# Patient Record
Sex: FEMALE | Race: WHITE | NOT HISPANIC OR LATINO | Employment: STUDENT | URBAN - METROPOLITAN AREA
[De-identification: names, ages, dates, MRNs, and addresses within clinical notes are randomized per-mention and may not be internally consistent; named-entity substitution may affect disease eponyms.]

---
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
## 2019-05-11 ENCOUNTER — TRANSFERRED RECORDS (OUTPATIENT)
Dept: HEALTH INFORMATION MANAGEMENT | Facility: CLINIC | Age: 19
End: 2019-05-11

## 2019-12-26 ENCOUNTER — TRANSFERRED RECORDS (OUTPATIENT)
Dept: HEALTH INFORMATION MANAGEMENT | Facility: CLINIC | Age: 19
End: 2019-12-26

## 2020-01-06 ENCOUNTER — OFFICE VISIT (OUTPATIENT)
Dept: PSYCHIATRY | Facility: CLINIC | Age: 20
End: 2020-01-06

## 2020-01-06 DIAGNOSIS — F33.2 SEVERE EPISODE OF RECURRENT MAJOR DEPRESSIVE DISORDER, WITHOUT PSYCHOTIC FEATURES (H): Primary | ICD-10-CM
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
## 2020-01-10 ASSESSMENT — ANXIETY QUESTIONNAIRES
GAD7 TOTAL SCORE: 15
3. WORRYING TOO MUCH ABOUT DIFFERENT THINGS: NEARLY EVERY DAY
2. NOT BEING ABLE TO STOP OR CONTROL WORRYING: NEARLY EVERY DAY
5. BEING SO RESTLESS THAT IT IS HARD TO SIT STILL: NOT AT ALL
6. BECOMING EASILY ANNOYED OR IRRITABLE: NEARLY EVERY DAY
1. FEELING NERVOUS, ANXIOUS, OR ON EDGE: NEARLY EVERY DAY
4. TROUBLE RELAXING: SEVERAL DAYS
7. FEELING AFRAID AS IF SOMETHING AWFUL MIGHT HAPPEN: MORE THAN HALF THE DAYS

## 2020-01-10 ASSESSMENT — PATIENT HEALTH QUESTIONNAIRE - PHQ9: SUM OF ALL RESPONSES TO PHQ QUESTIONS 1-9: 21

## 2020-01-11 ASSESSMENT — ANXIETY QUESTIONNAIRES: GAD7 TOTAL SCORE: 15

## 2020-01-15 ENCOUNTER — MEDICAL CORRESPONDENCE (OUTPATIENT)
Dept: HEALTH INFORMATION MANAGEMENT | Facility: CLINIC | Age: 20
End: 2020-01-15

## 2020-01-24 ENCOUNTER — OFFICE VISIT (OUTPATIENT)
Dept: PSYCHIATRY | Facility: CLINIC | Age: 20
End: 2020-01-24

## 2020-01-24 DIAGNOSIS — F33.2 SEVERE EPISODE OF RECURRENT MAJOR DEPRESSIVE DISORDER, WITHOUT PSYCHOTIC FEATURES (H): Primary | ICD-10-CM
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
## 2020-01-27 ENCOUNTER — TRANSFERRED RECORDS (OUTPATIENT)
Dept: HEALTH INFORMATION MANAGEMENT | Facility: CLINIC | Age: 20
End: 2020-01-27

## 2020-02-03 ENCOUNTER — OFFICE VISIT (OUTPATIENT)
Dept: PSYCHIATRY | Facility: CLINIC | Age: 20
End: 2020-02-03

## 2020-02-03 VITALS — SYSTOLIC BLOOD PRESSURE: 140 MMHG | DIASTOLIC BLOOD PRESSURE: 97 MMHG | HEART RATE: 89 BPM | WEIGHT: 182.4 LBS

## 2020-02-03 DIAGNOSIS — F33.2 SEVERE EPISODE OF RECURRENT MAJOR DEPRESSIVE DISORDER, WITHOUT PSYCHOTIC FEATURES (H): ICD-10-CM

## 2020-02-03 RX ORDER — ZIPRASIDONE HYDROCHLORIDE 20 MG/1
20 CAPSULE ORAL 4 TIMES DAILY
Status: ON HOLD | COMMUNITY
Start: 2020-01-27 | End: 2020-09-02

## 2020-02-03 RX ORDER — HEPARIN SODIUM (PORCINE) LOCK FLUSH IV SOLN 100 UNIT/ML 100 UNIT/ML
5 SOLUTION INTRAVENOUS
Status: CANCELLED | OUTPATIENT
Start: 2020-02-05

## 2020-02-03 RX ORDER — NORETHINDRONE ACETATE AND ETHINYL ESTRADIOL 1.5-30(21)
KIT ORAL
COMMUNITY
Start: 2019-07-23 | End: 2020-08-23

## 2020-02-03 RX ORDER — ONDANSETRON 2 MG/ML
4 INJECTION INTRAMUSCULAR; INTRAVENOUS
Status: CANCELLED | OUTPATIENT
Start: 2020-02-05

## 2020-02-03 RX ORDER — DESVENLAFAXINE 100 MG/1
100 TABLET, EXTENDED RELEASE ORAL AT BEDTIME
Status: ON HOLD | COMMUNITY
Start: 2020-01-27 | End: 2020-09-02

## 2020-02-03 RX ORDER — HEPARIN SODIUM,PORCINE 10 UNIT/ML
5 VIAL (ML) INTRAVENOUS
Status: CANCELLED | OUTPATIENT
Start: 2020-02-05

## 2020-02-03 RX ORDER — HYDRALAZINE HYDROCHLORIDE 20 MG/ML
10 INJECTION INTRAMUSCULAR; INTRAVENOUS
Status: CANCELLED | OUTPATIENT
Start: 2020-02-05

## 2020-02-03 SDOH — HEALTH STABILITY: MENTAL HEALTH: HOW OFTEN DO YOU HAVE A DRINK CONTAINING ALCOHOL?: NEVER

## 2020-02-03 ASSESSMENT — ANXIETY QUESTIONNAIRES
3. WORRYING TOO MUCH ABOUT DIFFERENT THINGS: NEARLY EVERY DAY
7. FEELING AFRAID AS IF SOMETHING AWFUL MIGHT HAPPEN: MORE THAN HALF THE DAYS
GAD7 TOTAL SCORE: 15
5. BEING SO RESTLESS THAT IT IS HARD TO SIT STILL: NOT AT ALL
2. NOT BEING ABLE TO STOP OR CONTROL WORRYING: NEARLY EVERY DAY
6. BECOMING EASILY ANNOYED OR IRRITABLE: NEARLY EVERY DAY
1. FEELING NERVOUS, ANXIOUS, OR ON EDGE: NEARLY EVERY DAY

## 2020-02-03 ASSESSMENT — PAIN SCALES - GENERAL: PAINLEVEL: NO PAIN (0)

## 2020-02-03 ASSESSMENT — PATIENT HEALTH QUESTIONNAIRE - PHQ9
5. POOR APPETITE OR OVEREATING: SEVERAL DAYS
SUM OF ALL RESPONSES TO PHQ QUESTIONS 1-9: 21

## 2020-02-03 NOTE — PATIENT INSTRUCTIONS
[facility]  [address] [place] [address]  [phone]      [email or web address]    Follow up with [facility] for ketamine infusion    Follow up with me after completing 6 ketamine infusions to assess need to continue with treatment    Follow up with [facility] to get pre-authorization for VNS Therapy. If denied, would consider VNS once on medicare and could participate in study
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
## 2020-02-04 ASSESSMENT — ANXIETY QUESTIONNAIRES: GAD7 TOTAL SCORE: 15

## 2020-02-17 ENCOUNTER — INFUSION THERAPY VISIT (OUTPATIENT)
Dept: INFUSION THERAPY | Facility: CLINIC | Age: 20
End: 2020-02-17
Attending: PSYCHIATRY & NEUROLOGY
Payer: COMMERCIAL

## 2020-02-17 VITALS
SYSTOLIC BLOOD PRESSURE: 119 MMHG | DIASTOLIC BLOOD PRESSURE: 82 MMHG | WEIGHT: 175.93 LBS | BODY MASS INDEX: 29.31 KG/M2 | TEMPERATURE: 98.3 F | HEART RATE: 74 BPM | HEIGHT: 65 IN | OXYGEN SATURATION: 99 % | RESPIRATION RATE: 16 BRPM

## 2020-02-17 DIAGNOSIS — F33.2 SEVERE EPISODE OF RECURRENT MAJOR DEPRESSIVE DISORDER, WITHOUT PSYCHOTIC FEATURES (H): Primary | ICD-10-CM

## 2020-02-17 PROCEDURE — 25800030 ZZH RX IP 258 OP 636: Performed by: PSYCHIATRY & NEUROLOGY

## 2020-02-17 PROCEDURE — 96365 THER/PROPH/DIAG IV INF INIT: CPT

## 2020-02-17 PROCEDURE — 25000125 ZZHC RX 250: Performed by: PSYCHIATRY & NEUROLOGY

## 2020-02-17 RX ORDER — MULTIPLE VITAMINS W/ MINERALS TAB 9MG-400MCG
1 TAB ORAL DAILY
Status: ON HOLD | COMMUNITY
End: 2020-08-23

## 2020-02-17 RX ORDER — HYDRALAZINE HYDROCHLORIDE 20 MG/ML
10 INJECTION INTRAMUSCULAR; INTRAVENOUS
Status: CANCELLED | OUTPATIENT
Start: 2020-02-17

## 2020-02-17 RX ORDER — HEPARIN SODIUM,PORCINE 10 UNIT/ML
5 VIAL (ML) INTRAVENOUS
Status: CANCELLED | OUTPATIENT
Start: 2020-02-17

## 2020-02-17 RX ORDER — ONDANSETRON 2 MG/ML
4 INJECTION INTRAMUSCULAR; INTRAVENOUS
Status: CANCELLED | OUTPATIENT
Start: 2020-02-17

## 2020-02-17 RX ORDER — HEPARIN SODIUM (PORCINE) LOCK FLUSH IV SOLN 100 UNIT/ML 100 UNIT/ML
5 SOLUTION INTRAVENOUS
Status: CANCELLED | OUTPATIENT
Start: 2020-02-17

## 2020-02-17 RX ADMIN — KETAMINE HYDROCHLORIDE 30 MG: 50 INJECTION, SOLUTION INTRAMUSCULAR; INTRAVENOUS at 09:09

## 2020-02-17 ASSESSMENT — MIFFLIN-ST. JEOR: SCORE: 1559.91

## 2020-02-17 NOTE — PROGRESS NOTES
Infusion Nursing Note:  Yoana Webber presents today for ketamine infusion (first dose).    Patient seen by provider today: No   present during visit today: Not Applicable.    Note: Verbally reviewed ketamine infusion.  No written material available via [facility]    Intravenous Access:  Peripheral IV placed.    Treatment Conditions:  Denies active SI.      Post Infusion Assessment:  Patient tolerated infusion without incident.  Patient observed for 120 minutes post ketamine infusion, per protocol.  Blood return noted pre and post infusion.  Site patent and intact, free from redness, edema or discomfort.  No evidence of extravasations.  Access discontinued per protocol.       Discharge Plan:   Discharge instructions reviewed with: Patient.  Patient discharged in stable condition accompanied by: mother.  Departure Mode: Ambulatory.  Patient smiling, laughing during infusion.    Elizabeth Uriarte

## 2020-02-18 ENCOUNTER — TELEPHONE (OUTPATIENT)
Dept: PSYCHIATRY | Facility: CLINIC | Age: 20
End: 2020-02-18

## 2020-02-18 NOTE — TELEPHONE ENCOUNTER
-Writer received call back from patient's mom, asking for consents to be put in mail for VNS auth's.

## 2020-02-18 NOTE — TELEPHONE ENCOUNTER
Zita Fregoso MD Swanson, Melanie A, RN             Patient agrees to start pre-authorization with[facility] for VNS

## 2020-02-18 NOTE — TELEPHONE ENCOUNTER
-Writer called Yoana to discuss forms needed to to start auth through [facility].  Received voicemail.  Left message asking for a return call. Clinic number provided.

## 2020-02-19 ENCOUNTER — INFUSION THERAPY VISIT (OUTPATIENT)
Dept: INFUSION THERAPY | Facility: CLINIC | Age: 20
End: 2020-02-19
Attending: PSYCHIATRY & NEUROLOGY
Payer: COMMERCIAL

## 2020-02-19 VITALS
DIASTOLIC BLOOD PRESSURE: 84 MMHG | HEART RATE: 75 BPM | SYSTOLIC BLOOD PRESSURE: 119 MMHG | OXYGEN SATURATION: 98 % | RESPIRATION RATE: 16 BRPM

## 2020-02-19 DIAGNOSIS — F33.2 SEVERE EPISODE OF RECURRENT MAJOR DEPRESSIVE DISORDER, WITHOUT PSYCHOTIC FEATURES (H): Primary | ICD-10-CM

## 2020-02-19 PROCEDURE — 25000125 ZZHC RX 250: Performed by: PSYCHIATRY & NEUROLOGY

## 2020-02-19 PROCEDURE — 25800030 ZZH RX IP 258 OP 636: Performed by: PSYCHIATRY & NEUROLOGY

## 2020-02-19 PROCEDURE — 96365 THER/PROPH/DIAG IV INF INIT: CPT

## 2020-02-19 RX ORDER — ONDANSETRON 2 MG/ML
4 INJECTION INTRAMUSCULAR; INTRAVENOUS
Status: CANCELLED | OUTPATIENT
Start: 2020-02-19

## 2020-02-19 RX ORDER — HEPARIN SODIUM,PORCINE 10 UNIT/ML
5 VIAL (ML) INTRAVENOUS
Status: CANCELLED | OUTPATIENT
Start: 2020-02-19

## 2020-02-19 RX ORDER — HYDRALAZINE HYDROCHLORIDE 20 MG/ML
10 INJECTION INTRAMUSCULAR; INTRAVENOUS
Status: CANCELLED | OUTPATIENT
Start: 2020-02-19

## 2020-02-19 RX ORDER — HEPARIN SODIUM (PORCINE) LOCK FLUSH IV SOLN 100 UNIT/ML 100 UNIT/ML
5 SOLUTION INTRAVENOUS
Status: CANCELLED | OUTPATIENT
Start: 2020-02-19

## 2020-02-19 RX ADMIN — KETAMINE HYDROCHLORIDE 30 MG: 50 INJECTION, SOLUTION INTRAMUSCULAR; INTRAVENOUS at 10:45

## 2020-02-19 NOTE — PROGRESS NOTES
Pt here for second ketamine infusion.  States she tolerated the first one well but does not notice a difference yet in mood.    Med infused over 40 min via P IV.  Monitored on cont pulse ox and q 15 min VS during and for 2 hr post infusion.  Tolerated well.      Discharged to home w/ ride.  RTC tomorrow.

## 2020-02-20 ENCOUNTER — INFUSION THERAPY VISIT (OUTPATIENT)
Dept: INFUSION THERAPY | Facility: CLINIC | Age: 20
End: 2020-02-20
Attending: PSYCHIATRY & NEUROLOGY
Payer: COMMERCIAL

## 2020-02-20 VITALS
TEMPERATURE: 98.5 F | SYSTOLIC BLOOD PRESSURE: 112 MMHG | DIASTOLIC BLOOD PRESSURE: 72 MMHG | RESPIRATION RATE: 16 BRPM | HEART RATE: 60 BPM | OXYGEN SATURATION: 99 %

## 2020-02-20 DIAGNOSIS — F33.2 SEVERE EPISODE OF RECURRENT MAJOR DEPRESSIVE DISORDER, WITHOUT PSYCHOTIC FEATURES (H): Primary | ICD-10-CM

## 2020-02-20 PROCEDURE — 25000125 ZZHC RX 250: Performed by: PSYCHIATRY & NEUROLOGY

## 2020-02-20 PROCEDURE — 96365 THER/PROPH/DIAG IV INF INIT: CPT

## 2020-02-20 PROCEDURE — 25800030 ZZH RX IP 258 OP 636: Performed by: PSYCHIATRY & NEUROLOGY

## 2020-02-20 RX ORDER — HYDRALAZINE HYDROCHLORIDE 20 MG/ML
10 INJECTION INTRAMUSCULAR; INTRAVENOUS
Status: CANCELLED | OUTPATIENT
Start: 2020-02-20

## 2020-02-20 RX ORDER — HEPARIN SODIUM,PORCINE 10 UNIT/ML
5 VIAL (ML) INTRAVENOUS
Status: CANCELLED | OUTPATIENT
Start: 2020-02-20

## 2020-02-20 RX ORDER — ONDANSETRON 2 MG/ML
4 INJECTION INTRAMUSCULAR; INTRAVENOUS
Status: CANCELLED | OUTPATIENT
Start: 2020-02-20

## 2020-02-20 RX ORDER — HEPARIN SODIUM (PORCINE) LOCK FLUSH IV SOLN 100 UNIT/ML 100 UNIT/ML
5 SOLUTION INTRAVENOUS
Status: CANCELLED | OUTPATIENT
Start: 2020-02-20

## 2020-02-20 RX ADMIN — SODIUM CHLORIDE 250 ML: 9 INJECTION, SOLUTION INTRAVENOUS at 10:51

## 2020-02-20 RX ADMIN — KETAMINE HYDROCHLORIDE 30 MG: 50 INJECTION, SOLUTION INTRAMUSCULAR; INTRAVENOUS at 10:52

## 2020-02-20 ASSESSMENT — PAIN SCALES - GENERAL: PAINLEVEL: NO PAIN (0)

## 2020-02-20 NOTE — PROGRESS NOTES
Infusion Nursing Note:  Yoana Webber presents today for ketamine.    Patient seen by provider today: No   present during visit today: Not Applicable.    Note: Patient states she does not feel any differently after receiving 2 doses of ketamine.  No changes or concerns at this time.    Intravenous Access:  Peripheral IV placed.    Post Infusion Assessment:  Patient tolerated infusion without incident.  VS monitored per protocol.  Patient observed for 120 minutes post ketamine per protocol.  Blood return noted pre and post infusion.  Site patent and intact, free from redness, edema or discomfort.  No evidence of extravasations.  Access discontinued per protocol.       Discharge Plan:   Patient discharged in stable condition accompanied by: father.  Departure Mode: Ambulatory.  Will return to clinic on Monday.  Esme Lee RN

## 2020-02-24 ENCOUNTER — INFUSION THERAPY VISIT (OUTPATIENT)
Dept: INFUSION THERAPY | Facility: CLINIC | Age: 20
End: 2020-02-24
Attending: PSYCHIATRY & NEUROLOGY
Payer: COMMERCIAL

## 2020-02-24 VITALS
BODY MASS INDEX: 28.94 KG/M2 | OXYGEN SATURATION: 96 % | SYSTOLIC BLOOD PRESSURE: 104 MMHG | DIASTOLIC BLOOD PRESSURE: 64 MMHG | RESPIRATION RATE: 16 BRPM | TEMPERATURE: 98.3 F | WEIGHT: 172.6 LBS | HEART RATE: 76 BPM

## 2020-02-24 DIAGNOSIS — F33.2 SEVERE EPISODE OF RECURRENT MAJOR DEPRESSIVE DISORDER, WITHOUT PSYCHOTIC FEATURES (H): Primary | ICD-10-CM

## 2020-02-24 PROCEDURE — 25000125 ZZHC RX 250: Performed by: PSYCHIATRY & NEUROLOGY

## 2020-02-24 PROCEDURE — 96365 THER/PROPH/DIAG IV INF INIT: CPT

## 2020-02-24 PROCEDURE — 25800030 ZZH RX IP 258 OP 636: Performed by: PSYCHIATRY & NEUROLOGY

## 2020-02-24 RX ORDER — ONDANSETRON 2 MG/ML
4 INJECTION INTRAMUSCULAR; INTRAVENOUS
Status: CANCELLED | OUTPATIENT
Start: 2020-02-24

## 2020-02-24 RX ORDER — HEPARIN SODIUM (PORCINE) LOCK FLUSH IV SOLN 100 UNIT/ML 100 UNIT/ML
5 SOLUTION INTRAVENOUS
Status: CANCELLED | OUTPATIENT
Start: 2020-02-24

## 2020-02-24 RX ORDER — HYDRALAZINE HYDROCHLORIDE 20 MG/ML
10 INJECTION INTRAMUSCULAR; INTRAVENOUS
Status: CANCELLED | OUTPATIENT
Start: 2020-02-24

## 2020-02-24 RX ORDER — HEPARIN SODIUM,PORCINE 10 UNIT/ML
5 VIAL (ML) INTRAVENOUS
Status: CANCELLED | OUTPATIENT
Start: 2020-02-24

## 2020-02-24 RX ADMIN — SODIUM CHLORIDE 250 ML: 9 INJECTION, SOLUTION INTRAVENOUS at 08:58

## 2020-02-24 RX ADMIN — KETAMINE HYDROCHLORIDE 30 MG: 50 INJECTION, SOLUTION INTRAMUSCULAR; INTRAVENOUS at 08:58

## 2020-02-24 ASSESSMENT — PAIN SCALES - GENERAL: PAINLEVEL: NO PAIN (0)

## 2020-02-26 ENCOUNTER — INFUSION THERAPY VISIT (OUTPATIENT)
Dept: INFUSION THERAPY | Facility: CLINIC | Age: 20
End: 2020-02-26
Attending: PSYCHIATRY & NEUROLOGY
Payer: COMMERCIAL

## 2020-02-26 VITALS
OXYGEN SATURATION: 97 % | RESPIRATION RATE: 16 BRPM | SYSTOLIC BLOOD PRESSURE: 112 MMHG | DIASTOLIC BLOOD PRESSURE: 74 MMHG | HEART RATE: 78 BPM

## 2020-02-26 DIAGNOSIS — F33.2 SEVERE EPISODE OF RECURRENT MAJOR DEPRESSIVE DISORDER, WITHOUT PSYCHOTIC FEATURES (H): Primary | ICD-10-CM

## 2020-02-26 PROCEDURE — 96365 THER/PROPH/DIAG IV INF INIT: CPT

## 2020-02-26 PROCEDURE — 25000125 ZZHC RX 250: Performed by: PSYCHIATRY & NEUROLOGY

## 2020-02-26 PROCEDURE — 25800030 ZZH RX IP 258 OP 636: Performed by: PSYCHIATRY & NEUROLOGY

## 2020-02-26 RX ORDER — HEPARIN SODIUM (PORCINE) LOCK FLUSH IV SOLN 100 UNIT/ML 100 UNIT/ML
5 SOLUTION INTRAVENOUS
Status: CANCELLED | OUTPATIENT
Start: 2020-02-26

## 2020-02-26 RX ORDER — HYDRALAZINE HYDROCHLORIDE 20 MG/ML
10 INJECTION INTRAMUSCULAR; INTRAVENOUS
Status: CANCELLED | OUTPATIENT
Start: 2020-02-26

## 2020-02-26 RX ORDER — ONDANSETRON 2 MG/ML
4 INJECTION INTRAMUSCULAR; INTRAVENOUS
Status: CANCELLED | OUTPATIENT
Start: 2020-02-26

## 2020-02-26 RX ORDER — HEPARIN SODIUM,PORCINE 10 UNIT/ML
5 VIAL (ML) INTRAVENOUS
Status: CANCELLED | OUTPATIENT
Start: 2020-02-26

## 2020-02-26 RX ADMIN — SODIUM CHLORIDE 250 ML: 9 INJECTION, SOLUTION INTRAVENOUS at 09:45

## 2020-02-26 RX ADMIN — KETAMINE HYDROCHLORIDE 30 MG: 50 INJECTION, SOLUTION INTRAMUSCULAR; INTRAVENOUS at 09:45

## 2020-02-26 NOTE — PROGRESS NOTES
"Pt here for ketamine infusion.   States she has noticed only minimal improvement in her mood.  Says she feels \"floaty\" during infusion.    Med infused over 40 min via P IV.  Monitored on cont pulse ox and q 15 min VS during and for 2 hr post infusion.  Tolerated well.      Discharged to home w/ ride.  RTC tomorrow.     "

## 2020-02-27 ENCOUNTER — INFUSION THERAPY VISIT (OUTPATIENT)
Dept: INFUSION THERAPY | Facility: CLINIC | Age: 20
End: 2020-02-27
Attending: PSYCHIATRY & NEUROLOGY
Payer: COMMERCIAL

## 2020-02-27 VITALS
OXYGEN SATURATION: 99 % | HEART RATE: 73 BPM | DIASTOLIC BLOOD PRESSURE: 73 MMHG | TEMPERATURE: 98.3 F | RESPIRATION RATE: 16 BRPM | SYSTOLIC BLOOD PRESSURE: 119 MMHG

## 2020-02-27 DIAGNOSIS — F33.2 SEVERE EPISODE OF RECURRENT MAJOR DEPRESSIVE DISORDER, WITHOUT PSYCHOTIC FEATURES (H): Primary | ICD-10-CM

## 2020-02-27 PROCEDURE — 25000125 ZZHC RX 250: Performed by: PSYCHIATRY & NEUROLOGY

## 2020-02-27 PROCEDURE — 96365 THER/PROPH/DIAG IV INF INIT: CPT

## 2020-02-27 PROCEDURE — 25800030 ZZH RX IP 258 OP 636: Performed by: PSYCHIATRY & NEUROLOGY

## 2020-02-27 RX ORDER — HEPARIN SODIUM (PORCINE) LOCK FLUSH IV SOLN 100 UNIT/ML 100 UNIT/ML
5 SOLUTION INTRAVENOUS
Status: CANCELLED | OUTPATIENT
Start: 2020-02-27

## 2020-02-27 RX ORDER — HYDRALAZINE HYDROCHLORIDE 20 MG/ML
10 INJECTION INTRAMUSCULAR; INTRAVENOUS
Status: CANCELLED | OUTPATIENT
Start: 2020-02-27

## 2020-02-27 RX ORDER — HEPARIN SODIUM,PORCINE 10 UNIT/ML
5 VIAL (ML) INTRAVENOUS
Status: CANCELLED | OUTPATIENT
Start: 2020-02-27

## 2020-02-27 RX ORDER — ONDANSETRON 2 MG/ML
4 INJECTION INTRAMUSCULAR; INTRAVENOUS
Status: CANCELLED | OUTPATIENT
Start: 2020-02-27

## 2020-02-27 RX ADMIN — SODIUM CHLORIDE 250 ML: 9 INJECTION, SOLUTION INTRAVENOUS at 10:39

## 2020-02-27 RX ADMIN — KETAMINE HYDROCHLORIDE 30 MG: 50 INJECTION, SOLUTION INTRAMUSCULAR; INTRAVENOUS at 10:39

## 2020-02-27 NOTE — PROGRESS NOTES
Infusion Nursing Note:  Yoana Webber presents today for ketamine infusion.    Patient seen by provider today: No   present during visit today: Not Applicable.    Note: States she is not sure if ketamine is helpful with depression or anxiety.    Intravenous Access:  Peripheral IV placed.    Treatment Conditions:  Not Applicable.      Post Infusion Assessment:  Patient tolerated infusion without incident.  Patient observed for 120 minutes post ketamine infusion, per protocol.  Blood return noted pre and post infusion.  Site patent and intact, free from redness, edema or discomfort.  No evidence of extravasations.  Access discontinued per protocol.  E-message sent to Dr. Fregoso to consider changing observation time from 2 hours to 1 hour.       Discharge Plan:   Patient discharged in stable condition accompanied by: father.  Departure Mode: Ambulatory.    Elizabeth Uriarte

## 2020-03-02 ENCOUNTER — INFUSION THERAPY VISIT (OUTPATIENT)
Dept: INFUSION THERAPY | Facility: CLINIC | Age: 20
End: 2020-03-02
Attending: PSYCHIATRY & NEUROLOGY
Payer: COMMERCIAL

## 2020-03-02 VITALS
SYSTOLIC BLOOD PRESSURE: 138 MMHG | OXYGEN SATURATION: 98 % | DIASTOLIC BLOOD PRESSURE: 89 MMHG | HEART RATE: 68 BPM | TEMPERATURE: 98.8 F | RESPIRATION RATE: 16 BRPM | WEIGHT: 172.7 LBS | BODY MASS INDEX: 28.96 KG/M2

## 2020-03-02 DIAGNOSIS — F33.2 SEVERE EPISODE OF RECURRENT MAJOR DEPRESSIVE DISORDER, WITHOUT PSYCHOTIC FEATURES (H): Primary | ICD-10-CM

## 2020-03-02 PROCEDURE — 25000125 ZZHC RX 250: Performed by: PSYCHIATRY & NEUROLOGY

## 2020-03-02 PROCEDURE — 96365 THER/PROPH/DIAG IV INF INIT: CPT

## 2020-03-02 PROCEDURE — 25800030 ZZH RX IP 258 OP 636: Performed by: PSYCHIATRY & NEUROLOGY

## 2020-03-02 RX ORDER — HEPARIN SODIUM,PORCINE 10 UNIT/ML
5 VIAL (ML) INTRAVENOUS
Status: CANCELLED | OUTPATIENT
Start: 2020-03-02

## 2020-03-02 RX ORDER — HYDRALAZINE HYDROCHLORIDE 20 MG/ML
10 INJECTION INTRAMUSCULAR; INTRAVENOUS
Status: CANCELLED | OUTPATIENT
Start: 2020-03-02

## 2020-03-02 RX ORDER — ONDANSETRON 2 MG/ML
4 INJECTION INTRAMUSCULAR; INTRAVENOUS
Status: CANCELLED | OUTPATIENT
Start: 2020-03-02

## 2020-03-02 RX ORDER — HEPARIN SODIUM (PORCINE) LOCK FLUSH IV SOLN 100 UNIT/ML 100 UNIT/ML
5 SOLUTION INTRAVENOUS
Status: CANCELLED | OUTPATIENT
Start: 2020-03-02

## 2020-03-02 RX ADMIN — SODIUM CHLORIDE 250 ML: 9 INJECTION, SOLUTION INTRAVENOUS at 11:14

## 2020-03-02 RX ADMIN — KETAMINE HYDROCHLORIDE 30 MG: 50 INJECTION, SOLUTION INTRAMUSCULAR; INTRAVENOUS at 11:15

## 2020-03-02 ASSESSMENT — PAIN SCALES - GENERAL: PAINLEVEL: NO PAIN (0)

## 2020-03-02 NOTE — PROGRESS NOTES
Infusion Nursing Note:  Yoana Webber presents today for ketamine.    Patient seen by provider today: No   present during visit today: Not Applicable.    Note: Patient states depression and anxiety are unchanged.    Intravenous Access:  Peripheral IV placed.    Post Infusion Assessment:  Patient tolerated infusion without incident.  VS monitored per protocol.  Patient observed for 120 minutes post ketamine per protocol.  Blood return noted pre and post infusion.  Site patent and intact, free from redness, edema or discomfort.  No evidence of extravasations.  Access discontinued per protocol.       Discharge Plan:   Patient discharged in stable condition accompanied by: mother  Departure Mode: Ambulatory.  Will return to clinic on Wednesday.  Esme Lee RN

## 2020-03-04 ENCOUNTER — INFUSION THERAPY VISIT (OUTPATIENT)
Dept: INFUSION THERAPY | Facility: CLINIC | Age: 20
End: 2020-03-04
Attending: PSYCHIATRY & NEUROLOGY
Payer: COMMERCIAL

## 2020-03-04 VITALS
TEMPERATURE: 98.1 F | OXYGEN SATURATION: 97 % | SYSTOLIC BLOOD PRESSURE: 125 MMHG | RESPIRATION RATE: 16 BRPM | HEART RATE: 86 BPM | DIASTOLIC BLOOD PRESSURE: 85 MMHG

## 2020-03-04 DIAGNOSIS — F33.2 SEVERE EPISODE OF RECURRENT MAJOR DEPRESSIVE DISORDER, WITHOUT PSYCHOTIC FEATURES (H): Primary | ICD-10-CM

## 2020-03-04 PROCEDURE — 25800030 ZZH RX IP 258 OP 636: Performed by: PSYCHIATRY & NEUROLOGY

## 2020-03-04 PROCEDURE — 96365 THER/PROPH/DIAG IV INF INIT: CPT

## 2020-03-04 PROCEDURE — 25000125 ZZHC RX 250: Performed by: PSYCHIATRY & NEUROLOGY

## 2020-03-04 RX ORDER — HEPARIN SODIUM (PORCINE) LOCK FLUSH IV SOLN 100 UNIT/ML 100 UNIT/ML
5 SOLUTION INTRAVENOUS
Status: CANCELLED | OUTPATIENT
Start: 2020-03-04

## 2020-03-04 RX ORDER — HYDRALAZINE HYDROCHLORIDE 20 MG/ML
10 INJECTION INTRAMUSCULAR; INTRAVENOUS
Status: CANCELLED | OUTPATIENT
Start: 2020-03-04

## 2020-03-04 RX ORDER — ONDANSETRON 2 MG/ML
4 INJECTION INTRAMUSCULAR; INTRAVENOUS
Status: CANCELLED | OUTPATIENT
Start: 2020-03-04

## 2020-03-04 RX ORDER — HEPARIN SODIUM,PORCINE 10 UNIT/ML
5 VIAL (ML) INTRAVENOUS
Status: CANCELLED | OUTPATIENT
Start: 2020-03-04

## 2020-03-04 RX ADMIN — KETAMINE HYDROCHLORIDE 30 MG: 50 INJECTION, SOLUTION INTRAMUSCULAR; INTRAVENOUS at 11:13

## 2020-03-04 RX ADMIN — SODIUM CHLORIDE 250 ML: 9 INJECTION, SOLUTION INTRAVENOUS at 11:13

## 2020-03-04 ASSESSMENT — PAIN SCALES - GENERAL: PAINLEVEL: NO PAIN (0)

## 2020-03-04 NOTE — PROGRESS NOTES
"Infusion Nursing Note:  Yoana Webber presents today for ketamine.    Patient seen by provider today: No   present during visit today: Not Applicable.    Note: Patient states she actually showered yesterday, so \"that's something\".    Intravenous Access:  Peripheral IV placed.    Post Infusion Assessment:  Patient tolerated infusion without incident.  VS monitored per protocol.  Patient observed for 120 minutes post ketamine per protocol.  Blood return noted pre and post infusion.  Site patent and intact, free from redness, edema or discomfort.  No evidence of extravasations.  Access discontinued per protocol.       Discharge Plan:   Patient discharged in stable condition accompanied by: friend.  Departure Mode: Ambulatory.  Will return to clinic tomorrow.  Esme Lee RN                        "

## 2020-03-05 ENCOUNTER — INFUSION THERAPY VISIT (OUTPATIENT)
Dept: INFUSION THERAPY | Facility: CLINIC | Age: 20
End: 2020-03-05
Attending: PSYCHIATRY & NEUROLOGY
Payer: COMMERCIAL

## 2020-03-05 VITALS
DIASTOLIC BLOOD PRESSURE: 78 MMHG | RESPIRATION RATE: 14 BRPM | HEART RATE: 79 BPM | OXYGEN SATURATION: 99 % | SYSTOLIC BLOOD PRESSURE: 134 MMHG

## 2020-03-05 DIAGNOSIS — F33.2 SEVERE EPISODE OF RECURRENT MAJOR DEPRESSIVE DISORDER, WITHOUT PSYCHOTIC FEATURES (H): Primary | ICD-10-CM

## 2020-03-05 PROCEDURE — 25800030 ZZH RX IP 258 OP 636: Performed by: PSYCHIATRY & NEUROLOGY

## 2020-03-05 PROCEDURE — 25000125 ZZHC RX 250: Performed by: PSYCHIATRY & NEUROLOGY

## 2020-03-05 PROCEDURE — 96365 THER/PROPH/DIAG IV INF INIT: CPT

## 2020-03-05 RX ORDER — HYDRALAZINE HYDROCHLORIDE 20 MG/ML
10 INJECTION INTRAMUSCULAR; INTRAVENOUS
Status: CANCELLED | OUTPATIENT
Start: 2020-03-05

## 2020-03-05 RX ORDER — HEPARIN SODIUM (PORCINE) LOCK FLUSH IV SOLN 100 UNIT/ML 100 UNIT/ML
5 SOLUTION INTRAVENOUS
Status: CANCELLED | OUTPATIENT
Start: 2020-03-05

## 2020-03-05 RX ORDER — ONDANSETRON 2 MG/ML
4 INJECTION INTRAMUSCULAR; INTRAVENOUS
Status: CANCELLED | OUTPATIENT
Start: 2020-03-05

## 2020-03-05 RX ORDER — HEPARIN SODIUM,PORCINE 10 UNIT/ML
5 VIAL (ML) INTRAVENOUS
Status: CANCELLED | OUTPATIENT
Start: 2020-03-05

## 2020-03-05 RX ADMIN — KETAMINE HYDROCHLORIDE 30 MG: 50 INJECTION, SOLUTION INTRAMUSCULAR; INTRAVENOUS at 10:49

## 2020-03-05 RX ADMIN — SODIUM CHLORIDE 250 ML: 9 INJECTION, SOLUTION INTRAVENOUS at 10:48

## 2020-03-05 NOTE — PROGRESS NOTES
Pt here for ketamine infusion.  States she has not noticed much improvement yet.     Med infused over 40 min via P IV.  Monitored on cont pulse ox and q 15 min VS during and for 2 hr post infusion.  Tolerated well.      Discharged to home w/ ride.  Does not have further infusions scheduled until she sees Dr Fregoso so message sent re: further infusions.

## 2020-03-10 ENCOUNTER — TELEPHONE (OUTPATIENT)
Dept: PSYCHIATRY | Facility: CLINIC | Age: 20
End: 2020-03-10

## 2020-03-10 ENCOUNTER — HOSPITAL ENCOUNTER (EMERGENCY)
Facility: CLINIC | Age: 20
Discharge: HOME | End: 2020-03-11
Attending: EMERGENCY MEDICINE | Admitting: EMERGENCY MEDICINE
Payer: COMMERCIAL

## 2020-03-10 DIAGNOSIS — F32.A DEPRESSION, UNSPECIFIED DEPRESSION TYPE: ICD-10-CM

## 2020-03-10 DIAGNOSIS — R45.851 SUICIDAL THOUGHTS: ICD-10-CM

## 2020-03-10 PROCEDURE — 80320 DRUG SCREEN QUANTALCOHOLS: CPT | Performed by: FAMILY MEDICINE

## 2020-03-10 PROCEDURE — 99284 EMERGENCY DEPT VISIT MOD MDM: CPT | Mod: Z6 | Performed by: EMERGENCY MEDICINE

## 2020-03-10 PROCEDURE — 81025 URINE PREGNANCY TEST: CPT | Performed by: FAMILY MEDICINE

## 2020-03-10 PROCEDURE — 80307 DRUG TEST PRSMV CHEM ANLYZR: CPT | Performed by: FAMILY MEDICINE

## 2020-03-10 PROCEDURE — 99285 EMERGENCY DEPT VISIT HI MDM: CPT | Mod: 25 | Performed by: EMERGENCY MEDICINE

## 2020-03-10 ASSESSMENT — MIFFLIN-ST. JEOR: SCORE: 1521.11

## 2020-03-10 NOTE — TELEPHONE ENCOUNTER
-Writer called patient to follow up with her on treatment plan.  Let her know that since she did not repsond to the Ketamine infusions, Dr. Fregoso would like to stop them.  He would like her to contact [facility]- encouraged her to do so.      -Also will resend consents for Amaliatravis Shyanne to get started on PA for VNS.

## 2020-03-10 NOTE — ED AVS SNAPSHOT
[facility], [place], Emergency Department  [address]  [facility] [address]  Phone:  [phone]  Fax:  [phone]                                    Yoana Webber   MRN: [id number]    Department:  [facility], Emergency Department   Date of Visit:  3/10/2020           After Visit Summary Signature Page    I have received my discharge instructions, and my questions have been answered. I have discussed any challenges I see with this plan with the nurse or doctor.    ..........................................................................................................................................  Patient/Patient Representative Signature      ..........................................................................................................................................  Patient Representative Print Name and Relationship to Patient    ..................................................               ................................................  Date                                   Time    ..........................................................................................................................................  Reviewed by Signature/Title    ...................................................              ..............................................  Date                                               Time          22EPIC Rev 08/18

## 2020-03-11 VITALS
DIASTOLIC BLOOD PRESSURE: 92 MMHG | OXYGEN SATURATION: 99 % | BODY MASS INDEX: 29.02 KG/M2 | WEIGHT: 170 LBS | TEMPERATURE: 96.9 F | SYSTOLIC BLOOD PRESSURE: 142 MMHG | HEIGHT: 64 IN

## 2020-03-11 LAB
AMPHETAMINES UR QL SCN: NEGATIVE
BARBITURATES UR QL: NEGATIVE
BENZODIAZ UR QL: NEGATIVE
CANNABINOIDS UR QL SCN: NEGATIVE
COCAINE UR QL: NEGATIVE
ETHANOL UR QL SCN: NEGATIVE
HCG UR QL: NEGATIVE
OPIATES UR QL SCN: NEGATIVE

## 2020-03-11 PROCEDURE — 90791 PSYCH DIAGNOSTIC EVALUATION: CPT

## 2020-03-11 ASSESSMENT — ENCOUNTER SYMPTOMS: DYSPHORIC MOOD: 1

## 2020-03-11 NOTE — DISCHARGE INSTRUCTIONS
Follow-up with your psychiatrist in 5 days as previously scheduled.  Return to the emergency department as needed for any new or worsening symptoms.

## 2020-03-11 NOTE — ED NOTES
"Discharge Call Completed.  Pt reported doing, \"okay.\" Pt stated they have a follow-up appointment scheduled and do not have any questions or concerns at this time.   "

## 2020-03-11 NOTE — ED NOTES
Pt laying on floor in hallway.  Pt asked multiple times to sit in chair due to traffic in hallway and floor of hospital being exposed to many contaminants.  Pt adamant about not sitting in chair.  Psych Associate and writer as Charge RN have spoken to Pt.  We currently do not have a room to place Pt.

## 2020-03-11 NOTE — ED TRIAGE NOTES
Pt states she has ongoing depression and found out today that her Ketamine infusions were being discontinued due to minimal improvement.  Pt states that the news had made her depression worse and she told her parents she was having suicidal thoughts so they brought her here.  Pt denies having taken anything in attempt to harm herself.
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
## 2020-03-16 ENCOUNTER — OFFICE VISIT (OUTPATIENT)
Dept: PSYCHIATRY | Facility: CLINIC | Age: 20
End: 2020-03-16

## 2020-03-16 VITALS
DIASTOLIC BLOOD PRESSURE: 82 MMHG | SYSTOLIC BLOOD PRESSURE: 132 MMHG | BODY MASS INDEX: 29.08 KG/M2 | HEART RATE: 130 BPM | WEIGHT: 169.4 LBS

## 2020-03-16 DIAGNOSIS — F33.1 MAJOR DEPRESSIVE DISORDER, RECURRENT EPISODE, MODERATE (H): Primary | ICD-10-CM

## 2020-03-16 ASSESSMENT — PAIN SCALES - GENERAL: PAINLEVEL: NO PAIN (0)
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
## 2020-03-16 NOTE — PATIENT INSTRUCTIONS
- we can restart the ketamine infusions: call the infusion center about restarting this. We want you to get 2 treatments this week and another early next week before moving to weekly treatments.    - we will have you sign a release of information for the [place] [facility] clinic. Once this is signed, we can send notes over there. Please also contact them to ask about getting on their schedule. Then give us an update next week about the plan of action for the TMS treatments    [facility]  [address] [place] [id number]  [phone]      [email or web address]

## 2020-03-24 ENCOUNTER — INFUSION THERAPY VISIT (OUTPATIENT)
Dept: INFUSION THERAPY | Facility: CLINIC | Age: 20
End: 2020-03-24
Attending: PSYCHIATRY & NEUROLOGY
Payer: COMMERCIAL

## 2020-03-24 VITALS
DIASTOLIC BLOOD PRESSURE: 83 MMHG | SYSTOLIC BLOOD PRESSURE: 125 MMHG | BODY MASS INDEX: 29.18 KG/M2 | OXYGEN SATURATION: 99 % | TEMPERATURE: 98.1 F | WEIGHT: 169.97 LBS

## 2020-03-24 DIAGNOSIS — F33.2 SEVERE EPISODE OF RECURRENT MAJOR DEPRESSIVE DISORDER, WITHOUT PSYCHOTIC FEATURES (H): Primary | ICD-10-CM

## 2020-03-24 PROCEDURE — 25000125 ZZHC RX 250: Performed by: PSYCHIATRY & NEUROLOGY

## 2020-03-24 PROCEDURE — 96365 THER/PROPH/DIAG IV INF INIT: CPT

## 2020-03-24 PROCEDURE — 25800030 ZZH RX IP 258 OP 636: Performed by: PSYCHIATRY & NEUROLOGY

## 2020-03-24 RX ORDER — HEPARIN SODIUM (PORCINE) LOCK FLUSH IV SOLN 100 UNIT/ML 100 UNIT/ML
5 SOLUTION INTRAVENOUS
Status: CANCELLED | OUTPATIENT
Start: 2020-03-24

## 2020-03-24 RX ORDER — HYDRALAZINE HYDROCHLORIDE 20 MG/ML
10 INJECTION INTRAMUSCULAR; INTRAVENOUS
Status: CANCELLED | OUTPATIENT
Start: 2020-03-24

## 2020-03-24 RX ORDER — HEPARIN SODIUM,PORCINE 10 UNIT/ML
5 VIAL (ML) INTRAVENOUS
Status: CANCELLED | OUTPATIENT
Start: 2020-03-24

## 2020-03-24 RX ORDER — ONDANSETRON 2 MG/ML
4 INJECTION INTRAMUSCULAR; INTRAVENOUS
Status: CANCELLED | OUTPATIENT
Start: 2020-03-24

## 2020-03-24 RX ADMIN — KETAMINE HYDROCHLORIDE 30 MG: 50 INJECTION, SOLUTION INTRAMUSCULAR; INTRAVENOUS at 12:24

## 2020-03-24 RX ADMIN — SODIUM CHLORIDE 250 ML: 9 INJECTION, SOLUTION INTRAVENOUS at 12:30

## 2020-03-24 NOTE — PROGRESS NOTES
Infusion Nursing Note:  Yoana Webber presents today for ketamine infusin.    Patient seen by provider today: No   present during visit today: Not Applicable.    Note: N/A.    Intravenous Access:  Peripheral IV placed.    Treatment Conditions:  Not Applicable.      Post Infusion Assessment:  Patient tolerated infusion without incident.  Patient observed for 60 minutes post ketamine infusion,  per protocol.  Blood return noted pre and post infusion.  Site patent and intact, free from redness, edema or discomfort.  No evidence of extravasations.  Access discontinued per protocol.       Discharge Plan:   Patient discharged in stable condition accompanied by: self.  Departure Mode: Ambulatory.  Denies active SI.    Elizabeth Uriarte

## 2020-03-26 ENCOUNTER — INFUSION THERAPY VISIT (OUTPATIENT)
Dept: INFUSION THERAPY | Facility: CLINIC | Age: 20
End: 2020-03-26
Attending: PSYCHIATRY & NEUROLOGY
Payer: COMMERCIAL

## 2020-03-26 VITALS
SYSTOLIC BLOOD PRESSURE: 132 MMHG | OXYGEN SATURATION: 99 % | TEMPERATURE: 98.5 F | HEART RATE: 85 BPM | DIASTOLIC BLOOD PRESSURE: 86 MMHG | RESPIRATION RATE: 16 BRPM

## 2020-03-26 DIAGNOSIS — F33.2 SEVERE EPISODE OF RECURRENT MAJOR DEPRESSIVE DISORDER, WITHOUT PSYCHOTIC FEATURES (H): Primary | ICD-10-CM

## 2020-03-26 PROCEDURE — 25000125 ZZHC RX 250: Mod: ZF | Performed by: PSYCHIATRY & NEUROLOGY

## 2020-03-26 PROCEDURE — 96365 THER/PROPH/DIAG IV INF INIT: CPT

## 2020-03-26 PROCEDURE — 25800030 ZZH RX IP 258 OP 636: Mod: ZF | Performed by: PSYCHIATRY & NEUROLOGY

## 2020-03-26 RX ORDER — ONDANSETRON 2 MG/ML
4 INJECTION INTRAMUSCULAR; INTRAVENOUS
Status: CANCELLED | OUTPATIENT
Start: 2020-03-26

## 2020-03-26 RX ORDER — HYDRALAZINE HYDROCHLORIDE 20 MG/ML
10 INJECTION INTRAMUSCULAR; INTRAVENOUS
Status: CANCELLED | OUTPATIENT
Start: 2020-03-26

## 2020-03-26 RX ORDER — HEPARIN SODIUM (PORCINE) LOCK FLUSH IV SOLN 100 UNIT/ML 100 UNIT/ML
5 SOLUTION INTRAVENOUS
Status: CANCELLED | OUTPATIENT
Start: 2020-03-26

## 2020-03-26 RX ORDER — HEPARIN SODIUM,PORCINE 10 UNIT/ML
5 VIAL (ML) INTRAVENOUS
Status: CANCELLED | OUTPATIENT
Start: 2020-03-26

## 2020-03-26 RX ADMIN — SODIUM CHLORIDE 250 ML: 9 INJECTION, SOLUTION INTRAVENOUS at 12:22

## 2020-03-26 RX ADMIN — SODIUM CHLORIDE 38.5 MG: 9 INJECTION, SOLUTION INTRAVENOUS at 12:22

## 2020-03-26 NOTE — PROGRESS NOTES
Nursing Note  Yoana Webber presents today to [facility] for:   Chief Complaint   Patient presents with     Infusion     Ketamine     During today's [facility] appointment, orders from Dr. Zita Fregoso were completed.  Frequency: three times weekly this week, once weekly for 2 weeks starting next week, then once every 2 weeks    Progress note:  Patient identification verified by name and date of birth.  Assessment completed.  Vitals recorded in Doc Flowsheets.  Patient was provided with education regarding medication/procedure and possible side effects.  Patient verbalized understanding.     present during visit today: Not Applicable.    Treatment Conditions: Non-applicable.    Premedications: were not ordered.    Drug Waste Record: No    Infusion length and rate:  infusion given over approximately 40 minutes at 84 ml/hr. 250ml NS bolus given over approximately 1 hour during patient's observation period.    Labs: were not ordered for this appointment.    Vascular access: peripheral IV placed today.    Post Infusion Assessment:  Patient tolerated infusion without incident. Vitals monitored q 15 mins during infusion and during 1 hour observation period. Patient discharged [facility] in stable condition, with ride home from her mother.    Discharge Plan:   Follow up plan of care with: ongoing infusions at [facility]., primary care provider, and ordering provider as scheduled.  Discharge instructions were reviewed with patient.  Patient/representative verbalized understanding of discharge instructions and all questions answered.  Patient discharged from [facility] in stable condition.    Zeny Medina RN       Administrations This Visit     0.9% sodium chloride BOLUS     Admin Date  03/26/2020 Action  New Bag Dose  250 mL Route  Intravenous Administered By  Esme Lee RN           ketamine (KETALAR) 0.5 mg/kg = 38.5 mg in sodium chloride 0.9 % 56 mL intermittent infusion     Admin Date  03/26/2020 Action  New Bag Dose  38.5 mg Rate  84 mL/hr Route  Intravenous Administered By  Zeny Rai, RN                /86   Pulse 85   Temp 98.5  F (36.9  C) (Oral)   Resp 16   LMP 02/25/2020   SpO2 99%

## 2020-03-26 NOTE — PATIENT INSTRUCTIONS
Dear Yoana Webber    Thank you for choosing [facility] ([facility]) for your Ketamine infusion.  The following information is a summary of our appointment as well as important reminders.      Patient Education     Ketamine Hydrochloride Solution for injection  What is this medicine?  Ketamine (JETT ta meen) is an anesthetic. It is used to produce sleep before and during surgery.  This medicine may be used for other purposes; ask your health care provider or pharmacist if you have questions.  What should I tell my health care provider before I take this medicine?  They need to know if you have any of these conditions:    bleeding in the brain    brain tumor    dehydration    head injury    heart disease    high blood pressure    history of stroke    if you often drink alcohol    mental illness    an unusual or allergic reaction to ketamine, other medicines, foods, dyes, or preservatives    pregnant or trying to get pregnant    breast-feeding  How should I use this medicine?  This medicine is for injection into a vein. It is given by a health care professional in a hospital or clinic setting.  Talk to your pediatrician regarding the use of this medicine in children. While this drug may be prescribed for children as young as 16 years for selected conditions, precautions do apply.  Overdosage: If you think you have taken too much of this medicine contact a poison control center or emergency room at once.  NOTE: This medicine is only for you. Do not share this medicine with others.  What if I miss a dose?  This does not apply.  What may interact with this medicine?  Do not take this medicine with any of the following medications:    MAOIs like Carbex, Eldepryl, Marplan, Nardil, and Parnate    [place]'s Wort  This medicine may also interact with the following medications:    alcohol    antihistamines for allergy, cough, and cold    certain medicines for blood  pressure    certain medicines for depression, anxiety, or psychotic disturbances    general anesthetics    medicines that relax muscles for surgery    memantine    narcotic medicines for pain    phenothiazines like chlorpromazine, mesoridazine, prochlorperazine, thioridazine    pregabalin    theophylline; aminophylline    thyroid hormones  This list may not describe all possible interactions. Give your health care provider a list of all the medicines, herbs, non-prescription drugs, or dietary supplements you use. Also tell them if you smoke, drink alcohol, or use illegal drugs. Some items may interact with your medicine.  What should I watch for while using this medicine?  Your condition will be monitored carefully while you are receiving this medicine.  You may get dizzy. Do not drive, use machinery, or do anything that needs mental alertness until you know how this medicine affects you. Do not stand or sit up quickly, especially if you are an older patient. This reduces the risk of dizzy or fainting spells. Avoid alcoholic drinks; they can make you more dizzy.  What side effects may I notice from receiving this medicine?  Side effects that you should report to your doctor or health care professional as soon as possible:    allergic reactions like skin rash, itching or hives, swelling of the face, lips, or tongue    breathing problems    changes in vision    fast heartbeat    hallucination, loss of contact with reality    signs and symptoms of low blood pressure like dizziness; feeling faint or lightheaded, falls; unusually weak or tired    uncontrollable head, mouth, neck, arm, or leg movements    unusually slow heartbeat  Side effects that usually do not require medical attention (report these to your doctor or health care professional if they continue or are bothersome):    anxious    excessive saliva production    upset stomach  This list may not describe all possible side effects. Call your doctor for medical  advice about side effects. You may report side effects to FDA at 2-161-AEO-1019.  Where should I keep my medicine?  This drug is given in a hospital or clinic and will not be stored at home.  NOTE: This sheet is a summary. It may not cover all possible information. If you have questions about this medicine, talk to your doctor, pharmacist, or health care provider.  NOTE:This sheet is a summary. It may not cover all possible information. If you have questions about this medicine, talk to your doctor, pharmacist, or health care provider. Copyright  2016 [employer] Standard             We look forward in seeing you on your next appointment here at [facility] ([facility]).  Please don t hesitate to call us at [phone] to reschedule any of your appointments or to speak with one of the [facility] registered nurses.  It was a pleasure taking care of you today.    Sincerely,    [facility]  [facility]  [address] [place] [address]  [address]  Phone:  [phone]

## 2020-03-27 ENCOUNTER — INFUSION THERAPY VISIT (OUTPATIENT)
Dept: INFUSION THERAPY | Facility: CLINIC | Age: 20
End: 2020-03-27
Attending: PSYCHIATRY & NEUROLOGY
Payer: COMMERCIAL

## 2020-03-27 VITALS
SYSTOLIC BLOOD PRESSURE: 113 MMHG | DIASTOLIC BLOOD PRESSURE: 80 MMHG | RESPIRATION RATE: 18 BRPM | HEART RATE: 85 BPM | OXYGEN SATURATION: 99 % | TEMPERATURE: 98.6 F

## 2020-03-27 DIAGNOSIS — F33.2 SEVERE EPISODE OF RECURRENT MAJOR DEPRESSIVE DISORDER, WITHOUT PSYCHOTIC FEATURES (H): Primary | ICD-10-CM

## 2020-03-27 PROCEDURE — 25000125 ZZHC RX 250: Mod: ZF | Performed by: PSYCHIATRY & NEUROLOGY

## 2020-03-27 PROCEDURE — 96365 THER/PROPH/DIAG IV INF INIT: CPT

## 2020-03-27 PROCEDURE — 25800030 ZZH RX IP 258 OP 636: Mod: ZF | Performed by: PSYCHIATRY & NEUROLOGY

## 2020-03-27 RX ORDER — HEPARIN SODIUM (PORCINE) LOCK FLUSH IV SOLN 100 UNIT/ML 100 UNIT/ML
5 SOLUTION INTRAVENOUS
Status: CANCELLED | OUTPATIENT
Start: 2020-03-27

## 2020-03-27 RX ORDER — HYDRALAZINE HYDROCHLORIDE 20 MG/ML
10 INJECTION INTRAMUSCULAR; INTRAVENOUS
Status: CANCELLED | OUTPATIENT
Start: 2020-03-27

## 2020-03-27 RX ORDER — HEPARIN SODIUM,PORCINE 10 UNIT/ML
5 VIAL (ML) INTRAVENOUS
Status: CANCELLED | OUTPATIENT
Start: 2020-03-27

## 2020-03-27 RX ORDER — ONDANSETRON 2 MG/ML
4 INJECTION INTRAMUSCULAR; INTRAVENOUS
Status: CANCELLED | OUTPATIENT
Start: 2020-03-27

## 2020-03-27 RX ADMIN — SODIUM CHLORIDE 30 MG: 9 INJECTION, SOLUTION INTRAVENOUS at 12:51

## 2020-03-27 NOTE — PROGRESS NOTES
Infusion Nursing Note:  Yoana Webber presents today for ketamine inusion.    Patient seen by provider today: No   present during visit today: Not Applicable.    Note: States depression is improved    Intravenous Access:  Peripheral IV placed.    Treatment Conditions:  Not Applicable.      Post Infusion Assessment:  Patient tolerated infusion without incident.  Patient observed for 60 minutes post ketamine infusion, per protocol.  Blood return noted pre and post infusion.  Site patent and intact, free from redness, edema or discomfort.  No evidence of extravasations.  Access discontinued per protocol.  Denies active SI.       Discharge Plan:   Patient declined prescription refills.  Patient discharged in stable condition accompanied by: self.  Departure Mode: Ambulatory.  Provided with printed schedule for next few appointments.    Elizabeth Uriarte

## 2020-04-02 ENCOUNTER — INFUSION THERAPY VISIT (OUTPATIENT)
Dept: INFUSION THERAPY | Facility: CLINIC | Age: 20
End: 2020-04-02
Attending: PSYCHIATRY & NEUROLOGY
Payer: COMMERCIAL

## 2020-04-02 VITALS
DIASTOLIC BLOOD PRESSURE: 88 MMHG | WEIGHT: 167.7 LBS | OXYGEN SATURATION: 98 % | SYSTOLIC BLOOD PRESSURE: 120 MMHG | RESPIRATION RATE: 16 BRPM | TEMPERATURE: 98.3 F | BODY MASS INDEX: 28.79 KG/M2

## 2020-04-02 DIAGNOSIS — F33.2 SEVERE EPISODE OF RECURRENT MAJOR DEPRESSIVE DISORDER, WITHOUT PSYCHOTIC FEATURES (H): Primary | ICD-10-CM

## 2020-04-02 PROCEDURE — 25800030 ZZH RX IP 258 OP 636: Mod: ZF | Performed by: PSYCHIATRY & NEUROLOGY

## 2020-04-02 PROCEDURE — 25000125 ZZHC RX 250: Mod: ZF | Performed by: PSYCHIATRY & NEUROLOGY

## 2020-04-02 PROCEDURE — 96365 THER/PROPH/DIAG IV INF INIT: CPT

## 2020-04-02 RX ORDER — HEPARIN SODIUM,PORCINE 10 UNIT/ML
5 VIAL (ML) INTRAVENOUS
Status: CANCELLED | OUTPATIENT
Start: 2020-04-02

## 2020-04-02 RX ORDER — HYDRALAZINE HYDROCHLORIDE 20 MG/ML
10 INJECTION INTRAMUSCULAR; INTRAVENOUS
Status: CANCELLED | OUTPATIENT
Start: 2020-04-02

## 2020-04-02 RX ORDER — ONDANSETRON 2 MG/ML
4 INJECTION INTRAMUSCULAR; INTRAVENOUS
Status: CANCELLED | OUTPATIENT
Start: 2020-04-02

## 2020-04-02 RX ORDER — HEPARIN SODIUM (PORCINE) LOCK FLUSH IV SOLN 100 UNIT/ML 100 UNIT/ML
5 SOLUTION INTRAVENOUS
Status: CANCELLED | OUTPATIENT
Start: 2020-04-02

## 2020-04-02 RX ADMIN — SODIUM CHLORIDE 30 MG: 9 INJECTION, SOLUTION INTRAVENOUS at 10:20

## 2020-04-02 NOTE — PATIENT INSTRUCTIONS
Dear Yoana Webber    Thank you for choosing [facility] ([facility]) for your Ketamine infusion.  The following information is a summary of our appointment as well as important reminders.      Patient Education     Ketamine injection  Brand Name: Ketalar  What is this medicine?  Ketamine (JETT ta meen) is an anesthetic. It is used to produce sleep before and during surgery.  How should I use this medicine?  This medicine is for injection into a vein. It is given by a health care professional in a hospital or clinic setting.  Talk to your pediatrician regarding the use of this medicine in children. While this drug may be prescribed for children as young as 16 years for selected conditions, precautions do apply.  What side effects may I notice from receiving this medicine?  Side effects that you should report to your doctor or health care professional as soon as possible:    allergic reactions like skin rash, itching or hives, swelling of the face, lips, or tongue    breathing problems    changes in vision    fast heartbeat    hallucination, loss of contact with reality    signs and symptoms of low blood pressure like dizziness; feeling faint or lightheaded, falls; unusually weak or tired    uncontrollable head, mouth, neck, arm, or leg movements    unusually slow heartbeat  Side effects that usually do not require medical attention (report to your doctor or health care professional if they continue or are bothersome):    anxious    excessive saliva production    upset stomach  What may interact with this medicine?  Do not take this medicine with any of the following medications:    MAOIs like Carbex, Eldepryl, Marplan, Nardil, and Parnate    [place]'s Wort  This medicine may also interact with the following medications:    alcohol    antihistamines for allergy, cough, and cold    certain medicines for blood pressure    certain medicines for depression,  anxiety, or psychotic disturbances    general anesthetics    medicines that relax muscles for surgery    memantine    narcotic medicines for pain    phenothiazines like chlorpromazine, mesoridazine, prochlorperazine, thioridazine    pregabalin    theophylline; aminophylline    thyroid hormones  What if I miss a dose?  This does not apply.  Where should I keep my medicine?  This drug is given in a hospital or clinic and will not be stored at home.  What should I tell my health care provider before I take this medicine?  They need to know if you have any of these conditions:    bleeding in the brain    brain tumor    dehydration    head injury    heart disease    high blood pressure    history of stroke    if you often drink alcohol    mental illness    an unusual or allergic reaction to ketamine, other medicines, foods, dyes, or preservatives    pregnant or trying to get pregnant    breast-feeding  What should I watch for while using this medicine?  Your condition will be monitored carefully while you are receiving this medicine.  You may get dizzy. Do not drive, use machinery, or do anything that needs mental alertness until you know how this medicine affects you. Do not stand or sit up quickly, especially if you are an older patient. This reduces the risk of dizzy or fainting spells. Avoid alcoholic drinks; they can make you more dizzy.  NOTE:This sheet is a summary. It may not cover all possible information. If you have questions about this medicine, talk to your doctor, pharmacist, or health care provider. Copyright  2019 [employer]             We look forward in seeing you on your next appointment here at [facility] ([facility]).  Please don t hesitate to call us at [phone] to reschedule any of your appointments or to speak with one of the [facility] registered nurses.  It was a pleasure taking care of you today.    Sincerely,    [facility]  [facility]  [address] [place] [address]  [address]  Phone:  [phone]

## 2020-04-02 NOTE — PROGRESS NOTES
Nursing Note  Yoana Webber presents [facility] for:   Chief Complaint   Patient presents with     Infusion     Ketamine     During today's [facility] appointment, orders from Dr. Zita Fregoso  Frequency: once weekly for two weeks, then once every two weeks    Progress note:  Patient identification verified by name and date of birth.  Assessment completed.  Vitals recorded in Doc Flowsheets.  Patient was provided with education regarding medication/procedure and possible side effects.  Patient verbalized understanding.     present during visit today: Not Applicable.    Treatment Conditions: Non-applicable.    Premedications: were not ordered.    Drug Waste Record: No    Infusion length and rate: Ketamine infusion given over approximately 40 minutes at 84 ml/hr. 100mL NS infused during observation period.    Labs: were not ordered for this appointment.    Vascular access: peripheral IV placed today.    Post Infusion Assessment:  Patient tolerated infusion without incident. Vitals monitored q 15 mins during infusion and for 1 hour post infusion completion. Discharged from [facility] in stable condition to home with her father.    Discharge Plan:   Follow up plan of care with: ongoing infusions at [facility]., ordering provider as scheduled. and after visit summary declined by patient  Discharge instructions were reviewed with patient.  Patient/representative verbalized understanding of discharge instructions and all questions answered.  Patient discharged [facility] in stable condition.    Zeny Rai RN        Administrations This Visit     ketamine (KETALAR) 30 mg in sodium chloride 0.9 % 56 mL intermittent infusion     Admin Date  04/02/2020 Action  New Bag Dose  30 mg Rate  84 mL/hr Route  Intravenous Administered By  Zeny Rai RN                /88   Temp 98.3   F (36.8  C) (Oral)   Resp 16   Wt 76.1 kg (167 lb 11.2 oz)   SpO2 98%   BMI 28.79 kg/m

## 2020-04-07 ENCOUNTER — INFUSION THERAPY VISIT (OUTPATIENT)
Dept: INFUSION THERAPY | Facility: CLINIC | Age: 20
End: 2020-04-07
Attending: PSYCHIATRY & NEUROLOGY
Payer: COMMERCIAL

## 2020-04-07 VITALS
TEMPERATURE: 98.3 F | DIASTOLIC BLOOD PRESSURE: 75 MMHG | HEART RATE: 81 BPM | SYSTOLIC BLOOD PRESSURE: 111 MMHG | RESPIRATION RATE: 16 BRPM | OXYGEN SATURATION: 99 %

## 2020-04-07 DIAGNOSIS — F33.2 SEVERE EPISODE OF RECURRENT MAJOR DEPRESSIVE DISORDER, WITHOUT PSYCHOTIC FEATURES (H): Primary | ICD-10-CM

## 2020-04-07 PROCEDURE — 25000125 ZZHC RX 250: Mod: ZF | Performed by: PSYCHIATRY & NEUROLOGY

## 2020-04-07 PROCEDURE — 25800030 ZZH RX IP 258 OP 636: Mod: ZF | Performed by: PSYCHIATRY & NEUROLOGY

## 2020-04-07 PROCEDURE — 96365 THER/PROPH/DIAG IV INF INIT: CPT

## 2020-04-07 RX ORDER — HYDRALAZINE HYDROCHLORIDE 20 MG/ML
10 INJECTION INTRAMUSCULAR; INTRAVENOUS
Status: CANCELLED | OUTPATIENT
Start: 2020-04-07

## 2020-04-07 RX ORDER — HEPARIN SODIUM,PORCINE 10 UNIT/ML
5 VIAL (ML) INTRAVENOUS
Status: CANCELLED | OUTPATIENT
Start: 2020-04-07

## 2020-04-07 RX ORDER — HEPARIN SODIUM (PORCINE) LOCK FLUSH IV SOLN 100 UNIT/ML 100 UNIT/ML
5 SOLUTION INTRAVENOUS
Status: CANCELLED | OUTPATIENT
Start: 2020-04-07

## 2020-04-07 RX ORDER — ONDANSETRON 2 MG/ML
4 INJECTION INTRAMUSCULAR; INTRAVENOUS
Status: CANCELLED | OUTPATIENT
Start: 2020-04-07

## 2020-04-07 RX ADMIN — SODIUM CHLORIDE 250 ML: 9 INJECTION, SOLUTION INTRAVENOUS at 10:23

## 2020-04-07 RX ADMIN — SODIUM CHLORIDE 28 MG: 9 INJECTION, SOLUTION INTRAVENOUS at 10:25

## 2020-04-07 NOTE — PROGRESS NOTES
Nursing Note  Yoana Webber presents [facility] for:   Chief Complaint   Patient presents with     Infusion     Ketamine     During today's [facility] appointment, orders from Dr. Zita Fregoso  Frequency: once weekly for two weeks, then once every two weeks    Progress note:  Patient identification verified by name and date of birth.  Assessment completed.  Vitals recorded in Doc Flowsheets.  Patient was provided with education regarding medication/procedure and possible side effects.  Patient verbalized understanding.     present during visit today: Not Applicable.    Treatment Conditions: Non-applicable.    Premedications: were not ordered.    Drug Waste Record: No    Infusion length and rate: Ketamine infusion given over approximately 40 minutes at 84 ml/hr. 250 mL NS infused during observation period.    Labs: were not ordered for this appointment.    Vascular access: peripheral IV placed today.    Post Infusion Assessment:  Patient tolerated infusion without incident. Vitals monitored q 15 mins during infusion and for 1 hour post infusion completion. Discharged from [facility] in stable condition to home with her father.    Discharge Plan:   Follow up plan of care with: ongoing infusions at [facility]., ordering provider as scheduled. and after visit summary declined by patient  Discharge instructions were reviewed with patient.  Patient/representative verbalized understanding of discharge instructions and all questions answered.  Patient discharged [facility] in stable condition.    Colette Cain RN    Administrations This Visit     0.9% sodium chloride BOLUS     Admin Date  04/07/2020 Action  New Bag Dose  250 mL Route  Intravenous Administered By  Colette Cain RN          ketamine (KETALAR) 0.5 mg/kg = 28 mg in sodium chloride 0.9 % 55.6 mL intermittent  infusion     Admin Date  04/07/2020 Action  New Bag Dose  28 mg Rate  83.4 mL/hr Route  Intravenous Administered By  Colette Cain RN                /70   Pulse 87   Temp 98.3  F (36.8  C) (Oral)   Resp 16   SpO2 97%

## 2020-04-21 ENCOUNTER — INFUSION THERAPY VISIT (OUTPATIENT)
Dept: INFUSION THERAPY | Facility: CLINIC | Age: 20
End: 2020-04-21
Attending: PSYCHIATRY & NEUROLOGY
Payer: COMMERCIAL

## 2020-04-21 VITALS
OXYGEN SATURATION: 99 % | RESPIRATION RATE: 16 BRPM | BODY MASS INDEX: 28.84 KG/M2 | SYSTOLIC BLOOD PRESSURE: 120 MMHG | TEMPERATURE: 98.2 F | DIASTOLIC BLOOD PRESSURE: 78 MMHG | HEART RATE: 72 BPM | WEIGHT: 168 LBS

## 2020-04-21 DIAGNOSIS — F33.2 SEVERE EPISODE OF RECURRENT MAJOR DEPRESSIVE DISORDER, WITHOUT PSYCHOTIC FEATURES (H): Primary | ICD-10-CM

## 2020-04-21 PROCEDURE — 25000125 ZZHC RX 250: Mod: ZF | Performed by: PSYCHIATRY & NEUROLOGY

## 2020-04-21 PROCEDURE — 25800030 ZZH RX IP 258 OP 636: Mod: ZF | Performed by: PSYCHIATRY & NEUROLOGY

## 2020-04-21 PROCEDURE — 96365 THER/PROPH/DIAG IV INF INIT: CPT

## 2020-04-21 RX ORDER — ONDANSETRON 2 MG/ML
4 INJECTION INTRAMUSCULAR; INTRAVENOUS
Status: CANCELLED | OUTPATIENT
Start: 2020-04-21

## 2020-04-21 RX ORDER — HEPARIN SODIUM (PORCINE) LOCK FLUSH IV SOLN 100 UNIT/ML 100 UNIT/ML
5 SOLUTION INTRAVENOUS
Status: CANCELLED | OUTPATIENT
Start: 2020-04-21

## 2020-04-21 RX ORDER — HYDRALAZINE HYDROCHLORIDE 20 MG/ML
10 INJECTION INTRAMUSCULAR; INTRAVENOUS
Status: CANCELLED | OUTPATIENT
Start: 2020-04-21

## 2020-04-21 RX ORDER — HEPARIN SODIUM,PORCINE 10 UNIT/ML
5 VIAL (ML) INTRAVENOUS
Status: CANCELLED | OUTPATIENT
Start: 2020-04-21

## 2020-04-21 RX ADMIN — SODIUM CHLORIDE 250 ML: 9 INJECTION, SOLUTION INTRAVENOUS at 11:40

## 2020-04-21 RX ADMIN — SODIUM CHLORIDE 28 MG: 9 INJECTION, SOLUTION INTRAVENOUS at 11:40

## 2020-04-21 NOTE — PATIENT INSTRUCTIONS
Dear Yoana Webber    Thank you for choosing [facility] ([facility]) for your infusion.  The following information is a summary of our appointment as well as important reminders.      Patient Education     Ketamine Hydrochloride Solution for injection  What is this medicine?  Ketamine (JETT ta meen) is an anesthetic. It is used to produce sleep before and during surgery.  This medicine may be used for other purposes; ask your health care provider or pharmacist if you have questions.  What should I tell my health care provider before I take this medicine?  They need to know if you have any of these conditions:    bleeding in the brain    brain tumor    dehydration    head injury    heart disease    high blood pressure    history of stroke    if you often drink alcohol    mental illness    an unusual or allergic reaction to ketamine, other medicines, foods, dyes, or preservatives    pregnant or trying to get pregnant    breast-feeding  How should I use this medicine?  This medicine is for injection into a vein. It is given by a health care professional in a hospital or clinic setting.  Talk to your pediatrician regarding the use of this medicine in children. While this drug may be prescribed for children as young as 16 years for selected conditions, precautions do apply.  Overdosage: If you think you have taken too much of this medicine contact a poison control center or emergency room at once.  NOTE: This medicine is only for you. Do not share this medicine with others.  What if I miss a dose?  This does not apply.  What may interact with this medicine?  Do not take this medicine with any of the following medications:    MAOIs like Carbex, Eldepryl, Marplan, Nardil, and Parnate    [place]'s Wort  This medicine may also interact with the following medications:    alcohol    antihistamines for allergy, cough, and cold    certain medicines for blood  pressure    certain medicines for depression, anxiety, or psychotic disturbances    general anesthetics    medicines that relax muscles for surgery    memantine    narcotic medicines for pain    phenothiazines like chlorpromazine, mesoridazine, prochlorperazine, thioridazine    pregabalin    theophylline; aminophylline    thyroid hormones  This list may not describe all possible interactions. Give your health care provider a list of all the medicines, herbs, non-prescription drugs, or dietary supplements you use. Also tell them if you smoke, drink alcohol, or use illegal drugs. Some items may interact with your medicine.  What should I watch for while using this medicine?  Your condition will be monitored carefully while you are receiving this medicine.  You may get dizzy. Do not drive, use machinery, or do anything that needs mental alertness until you know how this medicine affects you. Do not stand or sit up quickly, especially if you are an older patient. This reduces the risk of dizzy or fainting spells. Avoid alcoholic drinks; they can make you more dizzy.  What side effects may I notice from receiving this medicine?  Side effects that you should report to your doctor or health care professional as soon as possible:    allergic reactions like skin rash, itching or hives, swelling of the face, lips, or tongue    breathing problems    changes in vision    fast heartbeat    hallucination, loss of contact with reality    signs and symptoms of low blood pressure like dizziness; feeling faint or lightheaded, falls; unusually weak or tired    uncontrollable head, mouth, neck, arm, or leg movements    unusually slow heartbeat  Side effects that usually do not require medical attention (report these to your doctor or health care professional if they continue or are bothersome):    anxious    excessive saliva production    upset stomach  This list may not describe all possible side effects. Call your doctor for medical  advice about side effects. You may report side effects to FDA at 8-712-XUV-8521.  Where should I keep my medicine?  This drug is given in a hospital or clinic and will not be stored at home.  NOTE: This sheet is a summary. It may not cover all possible information. If you have questions about this medicine, talk to your doctor, pharmacist, or health care provider.  NOTE:This sheet is a summary. It may not cover all possible information. If you have questions about this medicine, talk to your doctor, pharmacist, or health care provider. Copyright  2016 [employer]             We look forward in seeing you on your next appointment here at [facility] ([facility]).  Please don t hesitate to call us at [phone] to reschedule any of your appointments or to speak with one of the [facility] registered nurses.  It was a pleasure taking care of you today.    Sincerely,    [facility]  [facility]  [address] [place] [address]  [address]  Phone:  [phone]

## 2020-04-21 NOTE — PROGRESS NOTES
Nursing Note  Yoana Webber presents [facility] for:   Chief Complaint   Patient presents with     Infusion     Ketamine     During today's [facility] appointment, orders from Zita Fregoso MD were completed.  Frequency: 3x per week, then once weekly for 2 weeks and then once every 2 weeks.     Progress note:  Patient identification verified by name and date of birth.  Assessment completed.  Vitals recorded in Doc Flowsheets.  Patient was provided with education regarding medication/procedure and possible side effects.  Patient verbalized understanding.     present during visit today: Not Applicable.    Treatment Conditions: 1 hours obs. VS monitored q15 min during infusion and during observation.     Premedications: were not ordered.    Drug Waste Record: No    Infusion length and rate:  infusion given over approximately 40 minutes   mL given over 1 hr during observation time per patient request    Labs: were not ordered for this appointment.    Vascular access: peripheral IV placed today.    Post Infusion Assessment:  Patient tolerated infusion without incident.     Discharge Plan:   Follow up plan of care with: ongoing infusions at [facility].  Discharge instructions were reviewed with patient.  Patient/representative verbalized understanding of discharge instructions and all questions answered.  Patient discharged from [facility] in stable condition.    Pretty Cabrera RN       Administrations This Visit     0.9% sodium chloride BOLUS     Admin Date  04/21/2020 Action  New Bag Dose  250 mL Route  Intravenous Administered By  Pretty Cabrera RN          ketamine (KETALAR) 0.5 mg/kg = 28 mg in sodium chloride 0.9 % 56 mL intermittent infusion     Admin Date  04/21/2020 Action  New Bag Dose  28 mg Rate  84 mL/hr Route  Intravenous Administered By  Pretty Cabrera RN                 /87   Pulse 87   Temp 98.2  F (36.8  C) (Oral)   Resp 16   Wt 76.2 kg (168 lb)   SpO2 100%   BMI 28.84 kg/m

## 2020-05-12 ENCOUNTER — INFUSION THERAPY VISIT (OUTPATIENT)
Dept: INFUSION THERAPY | Facility: CLINIC | Age: 20
End: 2020-05-12
Attending: PSYCHIATRY & NEUROLOGY
Payer: COMMERCIAL

## 2020-05-12 VITALS
RESPIRATION RATE: 15 BRPM | TEMPERATURE: 98.4 F | HEART RATE: 73 BPM | OXYGEN SATURATION: 100 % | SYSTOLIC BLOOD PRESSURE: 125 MMHG | DIASTOLIC BLOOD PRESSURE: 88 MMHG | BODY MASS INDEX: 28.61 KG/M2 | WEIGHT: 166.7 LBS

## 2020-05-12 DIAGNOSIS — F33.2 SEVERE EPISODE OF RECURRENT MAJOR DEPRESSIVE DISORDER, WITHOUT PSYCHOTIC FEATURES (H): Primary | ICD-10-CM

## 2020-05-12 PROCEDURE — 25800030 ZZH RX IP 258 OP 636: Mod: ZF | Performed by: PSYCHIATRY & NEUROLOGY

## 2020-05-12 PROCEDURE — 25000125 ZZHC RX 250: Mod: ZF | Performed by: PSYCHIATRY & NEUROLOGY

## 2020-05-12 PROCEDURE — 96365 THER/PROPH/DIAG IV INF INIT: CPT

## 2020-05-12 PROCEDURE — 40000556 ZZH STATISTIC PERIPHERAL IV START W US GUIDANCE: Mod: ZF

## 2020-05-12 RX ORDER — HEPARIN SODIUM (PORCINE) LOCK FLUSH IV SOLN 100 UNIT/ML 100 UNIT/ML
5 SOLUTION INTRAVENOUS
Status: CANCELLED | OUTPATIENT
Start: 2020-05-12

## 2020-05-12 RX ORDER — ONDANSETRON 2 MG/ML
4 INJECTION INTRAMUSCULAR; INTRAVENOUS
Status: CANCELLED | OUTPATIENT
Start: 2020-05-12

## 2020-05-12 RX ORDER — HYDRALAZINE HYDROCHLORIDE 20 MG/ML
10 INJECTION INTRAMUSCULAR; INTRAVENOUS
Status: CANCELLED | OUTPATIENT
Start: 2020-05-12

## 2020-05-12 RX ORDER — QUETIAPINE FUMARATE 100 MG/1
100 TABLET, FILM COATED ORAL
COMMUNITY
Start: 2018-03-14 | End: 2020-08-23 | Stop reason: SINTOL

## 2020-05-12 RX ORDER — ONDANSETRON 8 MG/1
8 TABLET, ORALLY DISINTEGRATING ORAL EVERY 8 HOURS PRN
COMMUNITY
End: 2021-12-20

## 2020-05-12 RX ORDER — ZIPRASIDONE HYDROCHLORIDE 40 MG/1
40 CAPSULE ORAL
COMMUNITY
Start: 2019-04-25 | End: 2020-08-23

## 2020-05-12 RX ORDER — HEPARIN SODIUM,PORCINE 10 UNIT/ML
5 VIAL (ML) INTRAVENOUS
Status: CANCELLED | OUTPATIENT
Start: 2020-05-12

## 2020-05-12 RX ADMIN — SODIUM CHLORIDE 28 MG: 9 INJECTION, SOLUTION INTRAVENOUS at 13:00

## 2020-05-12 RX ADMIN — SODIUM CHLORIDE 250 ML: 9 INJECTION, SOLUTION INTRAVENOUS at 12:57

## 2020-05-12 NOTE — PROGRESS NOTES
Nursing Note  Yoana Webber presents [facility] to [facility] for:   Chief Complaint   Patient presents with     Infusion     Ketamine     During today's [facility] appointment, orders from Zita Fregoso MD were completed.  Frequency: 3x per week, then once weekly for 2 weeks and then once every 2 weeks.     Progress note:  Patient identification verified by name and date of birth.  Assessment completed.  Vitals recorded in Doc Flowsheets.  Patient was provided with education regarding medication/procedure and possible side effects.  Patient verbalized understanding.     present during visit today: Not Applicable.    Treatment Conditions: 1 hours observation.   VS monitored q15 min during infusion and during observation.     Premedications: were not ordered.    Drug Waste Record: No    Infusion length and rate:  infusion given over approximately 40 minutes   mL given concurrent over 1.40 hr per patient request    Labs: were not ordered for this appointment.    Vascular access: peripheral IV placed today bu vascular access nurse .    Post Infusion Assessment:  Patient tolerated infusion without incident.     Discharge Plan:   Follow up plan of care with: ongoing infusions at [facility].  Discharge instructions were reviewed with patient.  Patient/representative verbalized understanding of discharge instructions and all questions answered.  Patient discharged from [facility] in stable condition.    Bianca Harrison RN    Administrations This Visit     0.9% sodium chloride BOLUS     Admin Date  05/12/2020 Action  New Bag Dose  250 mL Route  Intravenous Administered By  Bianca Harrison RN          ketamine (KETALAR) 0.5 mg/kg = 28 mg in sodium chloride 0.9 % 56 mL intermittent infusion     Admin Date  05/12/2020 Action  New Bag Dose  28 mg Rate  84 mL/hr Route  Intravenous Administered  By  Bianca Harrison, RN                   /86   Pulse 96   Temp 98.4  F (36.9  C) (Oral)   Resp 16   Wt 75.6 kg (166 lb 11.2 oz)   SpO2 97%   BMI 28.61 kg/m

## 2020-08-23 ENCOUNTER — TELEPHONE (OUTPATIENT)
Dept: BEHAVIORAL HEALTH | Facility: CLINIC | Age: 20
End: 2020-08-23

## 2020-08-23 ENCOUNTER — HOSPITAL ENCOUNTER (INPATIENT)
Facility: CLINIC | Age: 20
LOS: 10 days | Discharge: HOME | DRG: 885 | End: 2020-09-02
Attending: EMERGENCY MEDICINE | Admitting: PSYCHIATRY & NEUROLOGY
Payer: COMMERCIAL

## 2020-08-23 DIAGNOSIS — F41.9 ANXIETY: Primary | ICD-10-CM

## 2020-08-23 DIAGNOSIS — F99 INSOMNIA DUE TO OTHER MENTAL DISORDER: ICD-10-CM

## 2020-08-23 DIAGNOSIS — R41.3 MEMORY LOSS: ICD-10-CM

## 2020-08-23 DIAGNOSIS — Z03.818 ENCNTR FOR OBS FOR SUSP EXPSR TO OTH BIOLG AGENTS RULED OUT: ICD-10-CM

## 2020-08-23 DIAGNOSIS — F51.05 INSOMNIA DUE TO OTHER MENTAL DISORDER: ICD-10-CM

## 2020-08-23 DIAGNOSIS — R45.851 SUICIDE IDEATION: ICD-10-CM

## 2020-08-23 DIAGNOSIS — F33.2 SEVERE EPISODE OF RECURRENT MAJOR DEPRESSIVE DISORDER, WITHOUT PSYCHOTIC FEATURES (H): ICD-10-CM

## 2020-08-23 DIAGNOSIS — F32.A DEPRESSION, UNSPECIFIED DEPRESSION TYPE: ICD-10-CM

## 2020-08-23 DIAGNOSIS — F51.5 NIGHTMARES: ICD-10-CM

## 2020-08-23 LAB
AMPHETAMINES UR QL SCN: NEGATIVE
BARBITURATES UR QL: NEGATIVE
BENZODIAZ UR QL: NEGATIVE
CANNABINOIDS UR QL SCN: NEGATIVE
COCAINE UR QL: NEGATIVE
ETHANOL UR QL SCN: NEGATIVE
HCG UR QL: NEGATIVE
LABORATORY COMMENT REPORT: NORMAL
OPIATES UR QL SCN: NEGATIVE
SARS-COV-2 RNA SPEC QL NAA+PROBE: NEGATIVE
SARS-COV-2 RNA SPEC QL NAA+PROBE: NORMAL
SPECIMEN SOURCE: NORMAL
SPECIMEN SOURCE: NORMAL

## 2020-08-23 PROCEDURE — U0003 INFECTIOUS AGENT DETECTION BY NUCLEIC ACID (DNA OR RNA); SEVERE ACUTE RESPIRATORY SYNDROME CORONAVIRUS 2 (SARS-COV-2) (CORONAVIRUS DISEASE [COVID-19]), AMPLIFIED PROBE TECHNIQUE, MAKING USE OF HIGH THROUGHPUT TECHNOLOGIES AS DESCRIBED BY CMS-2020-01-R: HCPCS | Performed by: EMERGENCY MEDICINE

## 2020-08-23 PROCEDURE — 80320 DRUG SCREEN QUANTALCOHOLS: CPT | Performed by: EMERGENCY MEDICINE

## 2020-08-23 PROCEDURE — 80307 DRUG TEST PRSMV CHEM ANLYZR: CPT | Performed by: EMERGENCY MEDICINE

## 2020-08-23 PROCEDURE — 99207 ZZC CDG-HISTORY COMP: MEETS EXP. PROBLEM FOCUSED-DOWN CODED LACK OF ROS: CPT | Performed by: PSYCHIATRY & NEUROLOGY

## 2020-08-23 PROCEDURE — 99221 1ST HOSP IP/OBS SF/LOW 40: CPT | Mod: GT | Performed by: PSYCHIATRY & NEUROLOGY

## 2020-08-23 PROCEDURE — 25000132 ZZH RX MED GY IP 250 OP 250 PS 637: Performed by: PSYCHIATRY & NEUROLOGY

## 2020-08-23 PROCEDURE — C9803 HOPD COVID-19 SPEC COLLECT: HCPCS

## 2020-08-23 PROCEDURE — 12400001 ZZH R&B MH UMMC

## 2020-08-23 PROCEDURE — 25000132 ZZH RX MED GY IP 250 OP 250 PS 637: Performed by: NURSE PRACTITIONER

## 2020-08-23 PROCEDURE — 81291 MTHFR GENE: CPT | Performed by: PSYCHIATRY & NEUROLOGY

## 2020-08-23 PROCEDURE — 99285 EMERGENCY DEPT VISIT HI MDM: CPT | Mod: Z6 | Performed by: EMERGENCY MEDICINE

## 2020-08-23 PROCEDURE — 99285 EMERGENCY DEPT VISIT HI MDM: CPT | Mod: 25

## 2020-08-23 PROCEDURE — 99207 ZZC CDG-HISTORY COMP: MEETS EXP. PROB FOCUSED- DOWN CODED LACK OF PFSH: CPT | Performed by: PSYCHIATRY & NEUROLOGY

## 2020-08-23 PROCEDURE — 81025 URINE PREGNANCY TEST: CPT | Performed by: EMERGENCY MEDICINE

## 2020-08-23 PROCEDURE — 99207 ZZC DOWN CODE DUE TO INITIAL EXAM: CPT | Performed by: PSYCHIATRY & NEUROLOGY

## 2020-08-23 RX ORDER — ZIPRASIDONE HYDROCHLORIDE 20 MG/1
20 CAPSULE ORAL 4 TIMES DAILY
Status: DISCONTINUED | OUTPATIENT
Start: 2020-08-23 | End: 2020-08-24

## 2020-08-23 RX ORDER — TRAZODONE HYDROCHLORIDE 100 MG/1
100 TABLET ORAL AT BEDTIME
Status: ON HOLD | COMMUNITY
End: 2020-09-02

## 2020-08-23 RX ORDER — ALUMINA, MAGNESIA, AND SIMETHICONE 2400; 2400; 240 MG/30ML; MG/30ML; MG/30ML
30 SUSPENSION ORAL EVERY 4 HOURS PRN
Status: DISCONTINUED | OUTPATIENT
Start: 2020-08-23 | End: 2020-09-02 | Stop reason: HOSPADM

## 2020-08-23 RX ORDER — MEMANTINE HYDROCHLORIDE 10 MG/1
10 TABLET ORAL 2 TIMES DAILY
Status: DISCONTINUED | OUTPATIENT
Start: 2020-08-23 | End: 2020-09-02 | Stop reason: HOSPADM

## 2020-08-23 RX ORDER — CLOBETASOL PROPIONATE 0.5 MG/G
CREAM TOPICAL 2 TIMES DAILY PRN
COMMUNITY

## 2020-08-23 RX ORDER — PRAZOSIN HYDROCHLORIDE 1 MG/1
1 CAPSULE ORAL AT BEDTIME
Status: DISCONTINUED | OUTPATIENT
Start: 2020-08-23 | End: 2020-08-26

## 2020-08-23 RX ORDER — CLONAZEPAM 1 MG/1
1 TABLET ORAL
Status: ON HOLD | COMMUNITY
End: 2020-08-23

## 2020-08-23 RX ORDER — DESVENLAFAXINE 50 MG/1
100 TABLET, FILM COATED, EXTENDED RELEASE ORAL
Status: DISCONTINUED | OUTPATIENT
Start: 2020-08-23 | End: 2020-09-02 | Stop reason: HOSPADM

## 2020-08-23 RX ORDER — FOLIC ACID 1 MG/1
5 TABLET ORAL DAILY
Status: DISCONTINUED | OUTPATIENT
Start: 2020-08-23 | End: 2020-09-02 | Stop reason: HOSPADM

## 2020-08-23 RX ORDER — BISACODYL 10 MG
10 SUPPOSITORY, RECTAL RECTAL DAILY PRN
Status: DISCONTINUED | OUTPATIENT
Start: 2020-08-23 | End: 2020-09-02 | Stop reason: HOSPADM

## 2020-08-23 RX ORDER — CLONAZEPAM 1 MG/1
1 TABLET ORAL 2 TIMES DAILY PRN
Status: DISCONTINUED | OUTPATIENT
Start: 2020-08-23 | End: 2020-09-02 | Stop reason: HOSPADM

## 2020-08-23 RX ORDER — MEMANTINE HYDROCHLORIDE 5 MG/1
5 TABLET ORAL 2 TIMES DAILY
Status: DISCONTINUED | OUTPATIENT
Start: 2020-08-23 | End: 2020-08-23

## 2020-08-23 RX ORDER — ALBUTEROL SULFATE 90 UG/1
2 AEROSOL, METERED RESPIRATORY (INHALATION) EVERY 4 HOURS PRN
COMMUNITY

## 2020-08-23 RX ORDER — IBUPROFEN 200 MG
800 TABLET ORAL EVERY 6 HOURS PRN
Status: ON HOLD | COMMUNITY
End: 2021-06-11

## 2020-08-23 RX ORDER — HYDROXYZINE HYDROCHLORIDE 25 MG/1
25-50 TABLET, FILM COATED ORAL EVERY 4 HOURS PRN
Status: DISCONTINUED | OUTPATIENT
Start: 2020-08-23 | End: 2020-09-02 | Stop reason: HOSPADM

## 2020-08-23 RX ORDER — CLOBETASOL PROPIONATE 0.5 MG/G
CREAM TOPICAL PRN
Status: ON HOLD | COMMUNITY
End: 2020-08-23

## 2020-08-23 RX ORDER — LEVONORGESTREL/ETHIN.ESTRADIOL 0.1-0.02MG
1 TABLET ORAL
Status: DISCONTINUED | OUTPATIENT
Start: 2020-08-23 | End: 2020-09-02 | Stop reason: HOSPADM

## 2020-08-23 RX ORDER — TRAZODONE HYDROCHLORIDE 100 MG/1
100 TABLET ORAL AT BEDTIME
Status: DISCONTINUED | OUTPATIENT
Start: 2020-08-23 | End: 2020-08-27

## 2020-08-23 RX ORDER — MONTELUKAST SODIUM 10 MG/1
10 TABLET ORAL AT BEDTIME
COMMUNITY

## 2020-08-23 RX ORDER — ACETAMINOPHEN 325 MG/1
650 TABLET ORAL EVERY 4 HOURS PRN
Status: DISCONTINUED | OUTPATIENT
Start: 2020-08-23 | End: 2020-09-02 | Stop reason: HOSPADM

## 2020-08-23 RX ORDER — CLONAZEPAM 1 MG/1
1 TABLET ORAL
Status: ON HOLD | COMMUNITY
End: 2020-09-02

## 2020-08-23 RX ORDER — MEMANTINE HYDROCHLORIDE 5 MG/1
5 TABLET ORAL 2 TIMES DAILY
Status: ON HOLD | COMMUNITY
End: 2020-09-02

## 2020-08-23 RX ADMIN — PRAZOSIN HYDROCHLORIDE 1 MG: 1 CAPSULE ORAL at 20:14

## 2020-08-23 RX ADMIN — MEMANTINE 10 MG: 10 TABLET ORAL at 20:15

## 2020-08-23 RX ADMIN — TRAZODONE HYDROCHLORIDE 100 MG: 100 TABLET ORAL at 20:14

## 2020-08-23 RX ADMIN — ZIPRASIDONE HCL 20 MG: 20 CAPSULE ORAL at 20:15

## 2020-08-23 RX ADMIN — ZIPRASIDONE HCL 20 MG: 20 CAPSULE ORAL at 17:20

## 2020-08-23 RX ADMIN — DESVENLAFAXINE SUCCINATE 100 MG: 50 TABLET, EXTENDED RELEASE ORAL at 20:15

## 2020-08-23 RX ADMIN — Medication 1 TABLET: at 20:15

## 2020-08-23 ASSESSMENT — ACTIVITIES OF DAILY LIVING (ADL)
DRESS: 0-->INDEPENDENT
SWALLOWING: 0-->SWALLOWS FOODS/LIQUIDS WITHOUT DIFFICULTY
DRESS: SCRUBS (BEHAVIORAL HEALTH);INDEPENDENT
RETIRED_EATING: 0-->INDEPENDENT
HYGIENE/GROOMING: INDEPENDENT
LAUNDRY: UNABLE TO COMPLETE
RETIRED_COMMUNICATION: 0-->UNDERSTANDS/COMMUNICATES WITHOUT DIFFICULTY
BATHING: 0-->INDEPENDENT
ORAL_HYGIENE: INDEPENDENT
TOILETING: 0-->INDEPENDENT

## 2020-08-23 NOTE — PROGRESS NOTES
08/23/20 1706   Valuables   Patient Belongings locker;sent to security per site process   Patient Belongings Remaining with Patient cash/credit card;cell phone/electronics;clothing;glasses;keys;medication(s);money (see comment);plastic bag;purse/wallet;shoes   Patient Belongings Put in Hospital Secure Location (Security or Locker, etc.) cash/credit card;money (see comment);medication(s)   Did you bring any home meds/supplements to the hospital?  Yes   Disposition of meds  Sent to security/pharmacy per site process     -black yoga pants   -black shirt  -black bag   -shoes   -phone  -glasses w/ case   -wallet  -keys  -meds (RN / sent to security-controlled substance)   -mask  -  -braden pack  -headphones  -   -reading glasses   -floss kit   -bandages   -misc. Pens, , etc.     Security: misc. Change, checkbook, $81 cash, [place] [id number], [employer] 0169, [employer] GC 7283, [employer] , [employer] GC, [employer] gift cards [phone], [phone], [phone], [employer] debit [phone]    Pt keys are gone with dad on 8/24/20    A               Admission:  I am responsible for any personal items that are not sent to the safe or pharmacy.  [facility] is not responsible for loss, theft or damage of any property in my possession.    Signature:  _________________________________ Date: _______  Time: _____                                              Staff Signature:  ____________________________ Date: ________  Time: _____      2nd Staff person, if patient is unable/unwilling to sign:    Signature: ________________________________ Date: ________  Time: _____     Discharge:  [facility] has returned all of my personal belongings:    Signature: _________________________________ Date: ________  Time: _____                                          Staff Signature:  ____________________________ Date: ________  Time: _____
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
## 2020-08-23 NOTE — TELEPHONE ENCOUNTER
Patient cleared and ready for behavioral bed placement: Yes     S: 21 y/o female presented to the [facility] ED with SI.    B: Hx MDD, anxiety, PTSD.  SI w/a plan to overdose on 50,000 mg of aspirin.  Pt researched the amount that would be lethal for her weight.  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  Denied HI or psychosis.  Hx of ECT treatments in [place] and has received ketamine infusions at [facility] and providers with [facility].      A: Voluntary  No medical concerns.  COVID has been ordered.  Denied COVID sx.    R: Placed on wait list pending bed availability.

## 2020-08-23 NOTE — H&P
"Admitted:     08/23/2020      HISTORY OF PRESENT ILLNESS:  Ms. Yoana Yeung, date of birth 2000, is a 20-year-old woman admitted to [facility] on 08/23/20.  She was admitted to treat major depression, treatment resistant, with a suicidal plan to ingest 50,000 mg of aspirin, which she looked up as a fatal dose.  I interviewed the patient, reviewed the chart and reviewed Care Everywhere records from [facility], [facility], [facility], [facility], [facility] in [place], [facility] and [facility] in [place].      The patient has ongoing diagnoses of major depressive disorder, generalized anxiety disorder, PTSD, OCD, anorexia nervosa, by history and attention deficit disorder.  She notes that she always has an ongoing low level of suicidal ideation daily, which she rates as a \"3/10\", but flare-ups and stress will increase this as it did yesterday after an argument with her family.  She notes that she is suffering currently from \"existential dread,\" not wanting to be alive.\"  These feelings have been present since about age 12 with the not wanting to be alive constant and the excess existential dread coming and going.  She notes, \"Human beings have only been on this plan for a short period of time, so what does it matter if I live?\"      She has had, by her estimation, about 15 hospitalizations, 3 residential treatment stays, partial hospitalization, IOP, and outpatient therapy.  Past treatments involve ECT, which helped at first and left ear with memory dysfunction, Lithium, Abilify, Lamictal, Prozac, Paxil, Zoloft, Celexa, Lexapro, Effexor, Wellbutrin, Remeron, Viibryd, Trintellix, Fetzima, Cytomel, tramadol, Latuda, Zyprexa (weight gain), lorazepam, Synthroid, Seroquel, Strattera, Pamelor, Minipress which helped PTSD at 4 mg per day,   Ketamine which \"worked fantastic\" with 3 times a week injection " "but was stopped she states, \"Because I made a mistake and stopped it.\"  She has an appointment coming up to restart this.  Her current medications are Pristiq 100 mg per day, Klonopin 1 mg twice a day p.r.n., birth control pill, Namenda 5 mg per day in the hopes it would act somewhat like ketamine, trazodone 100 mg at bedtime, Geodon 20 mg 4 times a day.  QT interval in 2017 was 380.  She has not taken Depakote and is uncertain if she has taken BuSpar.      She has not had TMS.      FAMILY HISTORY:  Noted for a mother with bipolar 2, maternal aunt with bipolar, maternal aunt who is  who had schizophrenia, and a maternal grandfather with MDD and psychotic features.      VITAL SIGNS:  Blood pressure 130/85, temperature 98.2, pulse 116, respirations 16, weight 156 pounds, SpO2 100 on room air.  Notes from the Emergency Room were reviewed.      ALLERGIES:  HYDROCODONE/ACETAMINOPHEN WERE MENTIONED.  SHE IS ALSO ALLERGIC TO CODEINE AND LATEX.      REVIEW OF SYSTEMS:  Unremarkable.      MENTAL STATUS EXAMINATION:  Shows an alert, cooperative 20-year-old woman.  Eye contact with the screen (telemedicine evaluation) is good.  Speech production is normal.  Speech rate and volume is normal.  There is no sign of psychosis.  Suicidal thinking is noted, but she feels safe in the hospital.  Cognitions appear to be intact.  Content of thought revolves around her depression symptoms.      PLAN:  Plan at this time is to increase Namenda, restart Minipress, add high-dose folic acid and check MTHFR gene.  Estimated length of stay is 10 days, and discharge criteria is a safe discharge plan.    DIAGNOSIS:  major depressive disorder F33.2, generalized anxiety disorder F41.1  , PTSD, OCD, anorexia nervosa, by history and attention deficit disorder.     MARIMAR WOODARD MD             D: 2020   T: 2020   MT: YOANNA      Name:     VANESSA WILL   MRN:      [id number]        Account:      [id number]   :      " 2000        Admitted:     08/23/2020                   Document: [id number]       cc: [facility]

## 2020-08-23 NOTE — TELEPHONE ENCOUNTER
DEBORAH velasquez/Lucia/Meli    - on call paged 10am  - admit pending a transfer to 12N and room clean call at 12PM for report

## 2020-08-23 NOTE — PHARMACY-ADMISSION MEDICATION HISTORY
Admission Medication History Completed by Pharmacy    See [facility] Admission Navigator for allergy information, preferred outpatient pharmacy, prior to admission medications and immunization status.     Medication History Sources:     Patient, Dispense report    Changes made to PTA medication list (reason):    Added:   o Albuterol Sulfate HFA 90 mcg/actuation   o Montelukast sodium 10 mg   o Clobetasol 0.05% cream  o Ibuprofen 200 mg tablet    Deleted:   o Multivitamin w/ minerals (THERA-VIT-M) tablet: take 1 tablet po daily    Changed:   o Clonazepam 1 mg: take 1 mg by mouth daily as needed ---> Clonazepam 1 mg: take 1 tablet by mouth one to two times per day as needed    Additional Information:    Patient was a good historian.     Fill history and patient's knowledge correspond well.     Prior to Admission medications    Medication Sig Last Dose Taking? Auth Provider   albuterol (PROAIR HFA/PROVENTIL HFA/VENTOLIN HFA) 108 (90 Base) MCG/ACT inhaler Inhale 2 puffs into the lungs every 4 hours as needed for shortness of breath / dyspnea or wheezing Past Month at Unknown time Yes Unknown, Entered By History   clobetasol (TEMOVATE) 0.05 % CREA cream Apply topically 2 times daily as needed for eczema 8/22/2020 at Unknown time Yes Unknown, Entered By History   clonazePAM (KLONOPIN) 1 MG tablet Take 1 mg by mouth one to two times per day as needed  Yes Unknown, Entered By History   desvenlafaxine (PRISTIQ) 100 MG 24 hr tablet Take 100 mg by mouth At Bedtime  8/22/2020 at Unknown time Yes Reported, Patient   ibuprofen (ADVIL/MOTRIN) 200 MG tablet Take 800 mg by mouth every 6 hours as needed (for neck/back pain)  8/23/2020 at Unknown time Yes Unknown, Entered By History   LEVONORGESTREL-ETHINYL ESTRAD PO Take 1 tablet by mouth daily Lessina 8/22/2020 at Unknown time Yes Reported, Patient   memantine (NAMENDA) 5 MG tablet Take 5 mg by mouth 2 times daily 8/22/2020 at Unknown time Yes Reported, Patient   montelukast (SINGULAIR)  10 MG tablet Take 10 mg by mouth daily 8/22/2020 at Unknown time Yes Unknown, Entered By History   ondansetron (ZOFRAN-ODT) 8 MG ODT tab Take 8 mg by mouth every 8 hours as needed  Past Week at Unknown time Yes Reported, Patient   traZODone (DESYREL) 100 MG tablet Take 100 mg by mouth At Bedtime 8/22/2020 at Unknown time Yes Reported, Patient   ziprasidone (GEODON) 20 MG capsule Take 20 mg by mouth 4 times daily  8/22/2020 at Unknown time Yes Reported, Patient               Date completed: 08/23/20    Medication history completed by: Kelly Rojas

## 2020-08-23 NOTE — ED NOTES
Pt mother was comparing menopause to her organic brain injury and the Pt SI was triggered and wanted to commit suicide by eating 2 extra strength bottles of aspirin. 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 Pt states that if she were not in the hospital right not she would immediately try to kill herself

## 2020-08-23 NOTE — ED PROVIDER NOTES
ED Provider Note  [facility]      History     Chief Complaint   Patient presents with     Suicidal     SI with plan to 50,000 mg of aspirin.  Pt looked up lethal dosage for weight.       HPI  Yoana Webber is a 20 year old female who has a past medical history of major depression disorder, generalized anxiety disorder, PTSD who presents the emergency department for evaluation of suicidal ideation.  Patient reports that she has suicide ideation on a daily basis around the level of a 3/10.  Patient reports that this evening she got into an argument with her family and had worsening depression along with suicidal ideation with a plan to end her life.  Patient reports that she was planning to take 2 bottles of aspirin approximately 50,000 mg to end her life.  Patient denies any homicidal ideation.  Denies any acute medical complaints.  Reports she has been taking her medications as directed.  Denies any tobacco use, occasional alcohol use, denies any current drug abuse (reports using marijuana in the past).     Past Medical History  Past Medical History:   Diagnosis Date     Anxiety      Depressive disorder      OCD (obsessive compulsive disorder)      PTSD (post-traumatic stress disorder)      Past Surgical History:   Procedure Laterality Date     CHOLECYSTECTOMY       ENT SURGERY      tonsillectomy     clonazePAM (KLONOPIN) 1 MG tablet  desvenlafaxine (PRISTIQ) 100 MG 24 hr tablet  LEVONORGESTREL-ETHINYL ESTRAD PO  memantine (NAMENDA) 5 MG tablet  ondansetron (ZOFRAN-ODT) 8 MG ODT tab  traZODone (DESYREL) 100 MG tablet  ziprasidone (GEODON) 20 MG capsule  multivitamin w/minerals (THERA-VIT-M) tablet      Allergies   Allergen Reactions     Hydrocodone-Acetaminophen Other (See Comments), Unknown and Nausea and Vomiting     Severe hallucinations.  Patient reports hallucinations   Severe hallucinations  Pt had hallucinations  Patient reports hallucinations   Severe hallucinations  Severe  hallucinations.  Pt had hallucinations  Patient reports hallucinations   Patient reports hallucinations        Latex Hives     Codeine Other (See Comments), Nausea and Vomiting and Unknown     Pt reports having hallucinations.  Pt reports tolerating Tylenol       Family History  History reviewed. No pertinent family history.  Social History   Social History     Tobacco Use     Smoking status: Never Smoker     Smokeless tobacco: Never Used   Substance Use Topics     Alcohol use: Yes     Frequency: Never     Comment: drinks socially with parents on weekends     Drug use: Yes     Types: Marijuana      Past medical history, past surgical history, medications, allergies, family history, and social history were reviewed with the patient. No additional pertinent items.       Review of Systems   Psychiatric/Behavioral: Positive for suicidal ideas.   All other systems reviewed and are negative.    A complete review of systems was performed with pertinent positives and negatives noted in the HPI, and all other systems negative.    Physical Exam   BP: 127/88  Pulse: 91  Temp: 98.9  F (37.2  C)  Resp: 16  Weight: 70.8 kg (156 lb)  SpO2: 98 %  Physical Exam  General: Afebrile, no acute distress   HEENT: Normocephalic, atraumatic, conjunctivae normal. MMM  Neck: non-tender, supple  Cardio: regular rate.   Resp: Normal work of breathing, no respiratory distress  Chest/Back: no visual signs of trauma  Abdomen: soft, no tenderness  Neuro: alert and fully oriented. CN II-XII grossly intact. Grossly normal strength and sensation in all extremities.   MSK: no deformities. Normal range of motion  Integumentary/Skin: no rash visualized, normal color  Psych: normal affect, normal behavior, +suicide ideation with plan and intent    ED Course      Procedures    Results for orders placed or performed during the hospital encounter of 08/23/20   Drug abuse screen 6 urine (tox)     Status: None   Result Value Ref Range    Amphetamine Qual Urine  Negative NEG^Negative    Barbiturates Qual Urine Negative NEG^Negative    Benzodiazepine Qual Urine Negative NEG^Negative    Cannabinoids Qual Urine Negative NEG^Negative    Cocaine Qual Urine Negative NEG^Negative    Ethanol Qual Urine Negative NEG^Negative    Opiates Qualitative Urine Negative NEG^Negative   HCG qualitative urine (UPT)     Status: None   Result Value Ref Range    HCG Qual Urine Negative NEG^Negative     Medications - No data to display     Assessments & Plan (with Medical Decision Making)   Yoana Webber is a 20 year old female who has a past medical history of major depression disorder, generalized anxiety disorder, PTSD who presents the emergency department for evaluation of suicidal ideation.  Upon arrival patient is well-appearing, afebrile, no distress.  Patient here with worsening depression, suicidal ideation with plan and intent to self-harm.  At this time plan on voluntary mental health admission for stabilization.  Patient is agreeable to the plan.    I have reviewed the nursing notes. I have reviewed the findings, diagnosis, plan and need for follow up with the patient.    New Prescriptions    No medications on file       Final diagnoses:   Suicide ideation   Depression, unspecified depression type       --  Nel Sears MD  [facility], EMERGENCY DEPARTMENT  8/23/2020     Nel Sears MD  08/23/20 0423

## 2020-08-23 NOTE — H&P
H&P dictated    Video-Visit Details    Type of service:  Video Visit    Video Start Time (time video started): 1315    Video End Time (time video stopped): 1345    Originating Location (pt. Location): [facility]    Distant Location (provider location): Provider remote location    Mode of Communication:  Video Conference via [employer]    Physician has received verbal consent for a Video Visit from the patient? Yes      Juwan Louie MD
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
## 2020-08-23 NOTE — ED PROVIDER NOTES
I have performed an in person assessment of the patient. Based on this assessment the patient no longer requires a one on one attendant at this point in time.    Nel Sears MD  3:51 AM  August 23, 2020         Nel Sears MD  08/23/20 0351
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
## 2020-08-23 NOTE — ED NOTES
ED to Behavioral Floor Handoff    SITUATION  Yoana Webber is a 20 year old female who speaks English and lives in a home with family members The patient arrived in the ED by private car from home with a complaint of Suicidal (SI with plan to 50,000 mg of aspirin.  Pt looked up lethal dosage for weight.  )  .The patient's current symptoms started/worsened 1 week(s) ago and during this time the symptoms have increased.   In the ED, pt was diagnosed with   Final diagnoses:   Suicide ideation   Depression, unspecified depression type        Initial vitals were: BP: 127/88  Pulse: 91  Temp: 98.9  F (37.2  C)  Resp: 16  Weight: 70.8 kg (156 lb)  SpO2: 98 %   --------  Is the patient diabetic? No   If yes, last blood glucose? --     If yes, was this treated in the ED? --  --------  Is the patient inebriated (ETOH) No or Impaired on other substances? No  MSSA done? N/A  Last MSSA score: --    Were withdrawal symptoms treated? N/A  Does the patient have a seizure history? No. If yes, date of most recent seizure--  --------  Is the patient patient experiencing suicidal ideation? reports suicidal ideation with out intention or a suicidal plan    Homicidal ideation? denies current or recent homicidal ideation or behaviors.    Self-injurious behavior/urges? denies current or recent self injurious behavior or ideation.  ------  Was pt aggressive in the ED No  Was a code called No  Is the pt now cooperative? Yes  -------  Meds given in ED: Medications - No data to display   Family present during ED course? No  Family currently present? No    BACKGROUND  Does the patient have a cognitive impairment or developmental disability? No  Allergies:   Allergies   Allergen Reactions     Hydrocodone-Acetaminophen Other (See Comments), Unknown and Nausea and Vomiting     Severe hallucinations.  Patient reports hallucinations   Severe hallucinations  Pt had hallucinations  Patient reports hallucinations   Severe hallucinations  Severe  hallucinations.  Pt had hallucinations  Patient reports hallucinations   Patient reports hallucinations        Latex Hives     Codeine Other (See Comments), Nausea and Vomiting and Unknown     Pt reports having hallucinations.  Pt reports tolerating Tylenol     .   Social demographics are   Social History     Socioeconomic History     Marital status: Single     Spouse name: None     Number of children: None     Years of education: None     Highest education level: None   Occupational History     None   Social Needs     Financial resource strain: None     Food insecurity     Worry: None     Inability: None     Transportation needs     Medical: None     Non-medical: None   Tobacco Use     Smoking status: Never Smoker     Smokeless tobacco: Never Used   Substance and Sexual Activity     Alcohol use: Yes     Frequency: Never     Comment: drinks socially with parents on weekends     Drug use: Yes     Types: Marijuana     Sexual activity: Not Currently   Lifestyle     Physical activity     Days per week: None     Minutes per session: None     Stress: None   Relationships     Social connections     Talks on phone: None     Gets together: None     Attends Jew service: None     Active member of club or organization: None     Attends meetings of clubs or organizations: None     Relationship status: None     Intimate partner violence     Fear of current or ex partner: None     Emotionally abused: None     Physically abused: None     Forced sexual activity: None   Other Topics Concern     None   Social History Narrative     None        ASSESSMENT  Labs results   Labs Ordered and Resulted from Time of ED Arrival Up to the Time of Departure from the ED   DRUG ABUSE SCREEN 6 CHEM DEP URINE ([facility])   HCG QUALITATIVE URINE   COVID-19 VIRUS (CORONAVIRUS) BY PCR      Imaging Studies: No results found for this or any previous visit (from the past 24 hour(s)).   Most recent vital signs /88   Pulse 91   Temp 98.9  F (37.2   C) (Oral)   Resp 16   Wt 70.8 kg (156 lb)   LMP 07/22/2020 (Exact Date)   SpO2 98%   Breastfeeding No   BMI 26.78 kg/m     Abnormal labs/tests/findings requiring intervention:---   Pain control: pt had none  Nausea control: pt had none    RECOMMENDATION  Are any infection precautions needed (MRSA, VRE, etc.)? No If yes, what infection? --  ---  Does the patient have mobility issues? independently. If yes, what device does the pt use? ---  ---  Is patient on 72 hour hold or commitment? No If on 72 hour hold, have hold and rights been given to patient? N/A  Are admitting orders written if after 10 p.m. ?N/A  Tasks needing to be completed:---     Sabine Mendez, RN   [phone] [facility]

## 2020-08-24 LAB
ALBUMIN SERPL-MCNC: 3.5 G/DL (ref 3.4–5)
ALP SERPL-CCNC: 63 U/L (ref 40–150)
ALT SERPL W P-5'-P-CCNC: 12 U/L (ref 0–50)
ANION GAP SERPL CALCULATED.3IONS-SCNC: 5 MMOL/L (ref 3–14)
AST SERPL W P-5'-P-CCNC: 8 U/L (ref 0–45)
BASOPHILS # BLD AUTO: 0 10E9/L (ref 0–0.2)
BASOPHILS NFR BLD AUTO: 0.3 %
BILIRUB SERPL-MCNC: 0.3 MG/DL (ref 0.2–1.3)
BUN SERPL-MCNC: 13 MG/DL (ref 7–30)
CALCIUM SERPL-MCNC: 8.8 MG/DL (ref 8.5–10.1)
CHLORIDE SERPL-SCNC: 106 MMOL/L (ref 94–109)
CHOLEST SERPL-MCNC: 208 MG/DL
CO2 SERPL-SCNC: 27 MMOL/L (ref 20–32)
CREAT SERPL-MCNC: 0.96 MG/DL (ref 0.52–1.04)
DIFFERENTIAL METHOD BLD: NORMAL
EOSINOPHIL # BLD AUTO: 0 10E9/L (ref 0–0.7)
EOSINOPHIL NFR BLD AUTO: 0 %
ERYTHROCYTE [DISTWIDTH] IN BLOOD BY AUTOMATED COUNT: 13.4 % (ref 10–15)
GFR SERPL CREATININE-BSD FRML MDRD: 84 ML/MIN/{1.73_M2}
GLUCOSE SERPL-MCNC: 86 MG/DL (ref 70–99)
HCT VFR BLD AUTO: 40.6 % (ref 35–47)
HDLC SERPL-MCNC: 66 MG/DL
HGB BLD-MCNC: 13 G/DL (ref 11.7–15.7)
IMM GRANULOCYTES # BLD: 0 10E9/L (ref 0–0.4)
IMM GRANULOCYTES NFR BLD: 0.1 %
LDLC SERPL CALC-MCNC: 128 MG/DL
LYMPHOCYTES # BLD AUTO: 2.8 10E9/L (ref 0.8–5.3)
LYMPHOCYTES NFR BLD AUTO: 38.4 %
MCH RBC QN AUTO: 29.1 PG (ref 26.5–33)
MCHC RBC AUTO-ENTMCNC: 32 G/DL (ref 31.5–36.5)
MCV RBC AUTO: 91 FL (ref 78–100)
MONOCYTES # BLD AUTO: 0.4 10E9/L (ref 0–1.3)
MONOCYTES NFR BLD AUTO: 6 %
NEUTROPHILS # BLD AUTO: 4 10E9/L (ref 1.6–8.3)
NEUTROPHILS NFR BLD AUTO: 55.2 %
NONHDLC SERPL-MCNC: 142 MG/DL
NRBC # BLD AUTO: 0 10*3/UL
NRBC BLD AUTO-RTO: 0 /100
PLATELET # BLD AUTO: 211 10E9/L (ref 150–450)
POTASSIUM SERPL-SCNC: 4 MMOL/L (ref 3.4–5.3)
PROT SERPL-MCNC: 7.6 G/DL (ref 6.8–8.8)
RBC # BLD AUTO: 4.46 10E12/L (ref 3.8–5.2)
SODIUM SERPL-SCNC: 138 MMOL/L (ref 133–144)
TRIGL SERPL-MCNC: 68 MG/DL
TSH SERPL DL<=0.005 MIU/L-ACNC: 2.06 MU/L (ref 0.4–4)
WBC # BLD AUTO: 7.3 10E9/L (ref 4–11)

## 2020-08-24 PROCEDURE — 80053 COMPREHEN METABOLIC PANEL: CPT | Performed by: CLINICAL NURSE SPECIALIST

## 2020-08-24 PROCEDURE — 36415 COLL VENOUS BLD VENIPUNCTURE: CPT | Performed by: PSYCHIATRY & NEUROLOGY

## 2020-08-24 PROCEDURE — 25000132 ZZH RX MED GY IP 250 OP 250 PS 637: Performed by: CLINICAL NURSE SPECIALIST

## 2020-08-24 PROCEDURE — 99232 SBSQ HOSP IP/OBS MODERATE 35: CPT | Mod: GT | Performed by: CLINICAL NURSE SPECIALIST

## 2020-08-24 PROCEDURE — G0177 OPPS/PHP; TRAIN & EDUC SERV: HCPCS

## 2020-08-24 PROCEDURE — 25000132 ZZH RX MED GY IP 250 OP 250 PS 637: Performed by: PSYCHIATRY & NEUROLOGY

## 2020-08-24 PROCEDURE — 85025 COMPLETE CBC W/AUTO DIFF WBC: CPT | Performed by: CLINICAL NURSE SPECIALIST

## 2020-08-24 PROCEDURE — 12400001 ZZH R&B MH UMMC

## 2020-08-24 PROCEDURE — 36415 COLL VENOUS BLD VENIPUNCTURE: CPT | Performed by: CLINICAL NURSE SPECIALIST

## 2020-08-24 PROCEDURE — H2032 ACTIVITY THERAPY, PER 15 MIN: HCPCS

## 2020-08-24 PROCEDURE — 80061 LIPID PANEL: CPT | Performed by: CLINICAL NURSE SPECIALIST

## 2020-08-24 PROCEDURE — 25000132 ZZH RX MED GY IP 250 OP 250 PS 637: Performed by: NURSE PRACTITIONER

## 2020-08-24 PROCEDURE — 84443 ASSAY THYROID STIM HORMONE: CPT | Performed by: CLINICAL NURSE SPECIALIST

## 2020-08-24 RX ORDER — OLANZAPINE 10 MG/2ML
10 INJECTION, POWDER, FOR SOLUTION INTRAMUSCULAR DAILY PRN
Status: DISCONTINUED | OUTPATIENT
Start: 2020-08-24 | End: 2020-08-24

## 2020-08-24 RX ORDER — OLANZAPINE 5 MG/1
5-10 TABLET, ORALLY DISINTEGRATING ORAL AT BEDTIME
Status: DISCONTINUED | OUTPATIENT
Start: 2020-08-24 | End: 2020-08-24

## 2020-08-24 RX ORDER — ZIPRASIDONE HYDROCHLORIDE 20 MG/1
20 CAPSULE ORAL 2 TIMES DAILY WITH MEALS
Status: DISCONTINUED | OUTPATIENT
Start: 2020-08-24 | End: 2020-08-25

## 2020-08-24 RX ORDER — OLANZAPINE 5 MG/1
5-10 TABLET, ORALLY DISINTEGRATING ORAL
Status: DISCONTINUED | OUTPATIENT
Start: 2020-08-24 | End: 2020-08-25

## 2020-08-24 RX ORDER — OLANZAPINE 10 MG/2ML
10 INJECTION, POWDER, FOR SOLUTION INTRAMUSCULAR
Status: DISCONTINUED | OUTPATIENT
Start: 2020-08-24 | End: 2020-08-25

## 2020-08-24 RX ADMIN — DESVENLAFAXINE SUCCINATE 100 MG: 50 TABLET, EXTENDED RELEASE ORAL at 20:21

## 2020-08-24 RX ADMIN — FOLIC ACID 5 MG: 1 TABLET ORAL at 08:09

## 2020-08-24 RX ADMIN — HYDROXYZINE HYDROCHLORIDE 25 MG: 25 TABLET, FILM COATED ORAL at 18:28

## 2020-08-24 RX ADMIN — TRAZODONE HYDROCHLORIDE 100 MG: 100 TABLET ORAL at 21:36

## 2020-08-24 RX ADMIN — CLONAZEPAM 1 MG: 1 TABLET ORAL at 18:57

## 2020-08-24 RX ADMIN — ZIPRASIDONE HCL 20 MG: 20 CAPSULE ORAL at 12:59

## 2020-08-24 RX ADMIN — ZIPRASIDONE HCL 20 MG: 20 CAPSULE ORAL at 17:24

## 2020-08-24 RX ADMIN — MEMANTINE 10 MG: 10 TABLET ORAL at 20:22

## 2020-08-24 RX ADMIN — MEMANTINE 10 MG: 10 TABLET ORAL at 08:10

## 2020-08-24 RX ADMIN — PRAZOSIN HYDROCHLORIDE 1 MG: 1 CAPSULE ORAL at 21:36

## 2020-08-24 RX ADMIN — ZIPRASIDONE HCL 20 MG: 20 CAPSULE ORAL at 08:10

## 2020-08-24 RX ADMIN — ACETAMINOPHEN 650 MG: 325 TABLET, FILM COATED ORAL at 21:35

## 2020-08-24 RX ADMIN — Medication 1 TABLET: at 20:23

## 2020-08-24 ASSESSMENT — ACTIVITIES OF DAILY LIVING (ADL): HYGIENE/GROOMING: INDEPENDENT
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
## 2020-08-24 NOTE — PLAN OF CARE
"Patient up and visible in the milieu.  Social and attending groups.    Full range affect during interview.  When how her day was going she stated \"I've had better\".  Continues to reports suicidal thoughts chronic--but currently without intent, plan and feels safe on the unit, but was not able to contract for safety if discharged.  Also reports some urges to \"scratch my arms until I bleed\"--she put on a sweatshirt to help manage her urges.  Geodon was given as scheduled.  Discussed  use of hydroxyzine ir needed (and prn clonopin).    Rates depression as 8 and anxiety as 9 (10 worst).   Currently denies hallucinations.    Of note, affect and social behaviors seem somewhat incongruent with degree of depression and anxiety expressed.    Denies concerns to this writer regarding sleep, appetite, medication side effects.  "

## 2020-08-24 NOTE — PLAN OF CARE
"INITIAL OT NOTE  Problem: OT General Care Plan  Goal: OT Goal 1    Pt actively participated in occupational therapy clinic. Pt was able to ask for assistance as needed, and independently initiate self-selected task. Pt demonstrated good focus, planning, and attention to detail. Pt appeared comfortable interacting with peers, socializing frequently. Bright affect. Pt actively participated in a structured occupational therapy group with a focus on facilitating self-awareness, self-esteem, and social engagement. Pt appeared comfortable sharing positive personal information and was respectful in listening and responding to peers. Pt identified \"cutting to the point of needing stitches\" as a behavior she needs to change. Pt shares \"going to therapy\" as a positive way to raise her self-esteem.       "

## 2020-08-24 NOTE — PLAN OF CARE
BEHAVIORAL TEAM DISCUSSION    Participants: 4A Provider: Debra Naegele, APRN, CNS; 4A RN's: Ena Hill, RN; 4A CTC's:  Nathaly Don, ([facility]).  Progress:  Continuing to Assess .  Continued Stay Criteria/Rationale: New Patient  Medical/Physical: None  Precautions:    Behavioral Orders   Procedures    Code 1 - Restrict to Unit    Routine Programming     As clinically indicated    Status 15     Every 15 minutes.    Suicide precautions     Patients on Suicide Precautions should have a Combination Diet ordered that includes a Diet selection(s) AND a Behavioral Tray selection for Safe Tray - with utensils, or Safe Tray - NO utensils       Plan: CTC will coordinate disposition and after care planning..  The following services will be provided to the patient; psychiatric assessment, medication management, therapeutic milieu, individual and group support, art therapy, and skills/OT groups.   Rationale for change in precautions or plan: No Change.

## 2020-08-24 NOTE — PROGRESS NOTES
[facility], [place]   Psychiatric Progress Note        Interim History:   The patient's care was discussed with the treatment team during the daily team meeting and/or staff's chart notes were reviewed.  Staff report patient is visible in the milieu.    Psychiatric symptoms and interventions:   Patient reports feeling depressed and having passive suicidal ideation. Patient reports she can not keep herself safe outside of hospital. She recently had 16 staples removed from her leg due to SIB.     Schedule Geodon 40 mg BID- mood stabilization  Will continue folic acid 5 mg until MTHFR gene test results.     Medical: UTOX negative, HCG negative, CMP WNL. Lipid panel elevated, TSH 2.06 WNL, CBC with diff WNL    Behavioral/psychology/social:  Encouraged patient to attend therapeutic hospital programming olerated.   SIB precautions       Medications:       desvenlafaxine  100 mg Oral Daily at 8 pm     folic acid  5 mg Oral Daily     levonorgestrel-ethinyl estradiol  1 tablet Oral Daily at 8 pm     memantine  10 mg Oral BID     prazosin  1 mg Oral At Bedtime     traZODone  100 mg Oral At Bedtime     ziprasidone  20 mg Oral 4x Daily          Allergies:     Allergies   Allergen Reactions     Hydrocodone-Acetaminophen Other (See Comments), Unknown and Nausea and Vomiting     Severe hallucinations.  Patient reports hallucinations   Severe hallucinations  Pt had hallucinations  Patient reports hallucinations   Severe hallucinations  Severe hallucinations.  Pt had hallucinations  Patient reports hallucinations   Patient reports hallucinations        Latex Hives     Codeine Other (See Comments), Nausea and Vomiting and Unknown     Pt reports having hallucinations.  Pt reports tolerating Tylenol            Labs:     Recent Results (from the past 24 hour(s))   Lipid panel    Collection Time: 08/24/20  7:43 AM   Result Value Ref Range    Cholesterol 208 (H) <200 mg/dL    Triglycerides 68 <150 mg/dL    HDL  Cholesterol 66 >49 mg/dL    LDL Cholesterol Calculated 128 (H) <100 mg/dL    Non HDL Cholesterol 142 (H) <130 mg/dL   TSH with free T4 reflex and/or T3 as indicated    Collection Time: 08/24/20  7:43 AM   Result Value Ref Range    TSH 2.06 0.40 - 4.00 mU/L   Comprehensive metabolic panel    Collection Time: 08/24/20  7:43 AM   Result Value Ref Range    Sodium 138 133 - 144 mmol/L    Potassium 4.0 3.4 - 5.3 mmol/L    Chloride 106 94 - 109 mmol/L    Carbon Dioxide 27 20 - 32 mmol/L    Anion Gap 5 3 - 14 mmol/L    Glucose 86 70 - 99 mg/dL    Urea Nitrogen 13 7 - 30 mg/dL    Creatinine 0.96 0.52 - 1.04 mg/dL    GFR Estimate 84 >60 mL/min/[1.73_m2]    GFR Estimate If Black >90 >60 mL/min/[1.73_m2]    Calcium 8.8 8.5 - 10.1 mg/dL    Bilirubin Total 0.3 0.2 - 1.3 mg/dL    Albumin 3.5 3.4 - 5.0 g/dL    Protein Total 7.6 6.8 - 8.8 g/dL    Alkaline Phosphatase 63 40 - 150 U/L    ALT 12 0 - 50 U/L    AST 8 0 - 45 U/L   CBC with platelets differential    Collection Time: 08/24/20  7:43 AM   Result Value Ref Range    WBC 7.3 4.0 - 11.0 10e9/L    RBC Count 4.46 3.8 - 5.2 10e12/L    Hemoglobin 13.0 11.7 - 15.7 g/dL    Hematocrit 40.6 35.0 - 47.0 %    MCV 91 78 - 100 fl    MCH 29.1 26.5 - 33.0 pg    MCHC 32.0 31.5 - 36.5 g/dL    RDW 13.4 10.0 - 15.0 %    Platelet Count 211 150 - 450 10e9/L    Diff Method Automated Method     % Neutrophils 55.2 %    % Lymphocytes 38.4 %    % Monocytes 6.0 %    % Eosinophils 0.0 %    % Basophils 0.3 %    % Immature Granulocytes 0.1 %    Nucleated RBCs 0 0 /100    Absolute Neutrophil 4.0 1.6 - 8.3 10e9/L    Absolute Lymphocytes 2.8 0.8 - 5.3 10e9/L    Absolute Monocytes 0.4 0.0 - 1.3 10e9/L    Absolute Eosinophils 0.0 0.0 - 0.7 10e9/L    Absolute Basophils 0.0 0.0 - 0.2 10e9/L    Abs Immature Granulocytes 0.0 0 - 0.4 10e9/L    Absolute Nucleated RBC 0.0           Psychiatric Examination:     /88   Pulse 85   Temp 98.4  F (36.9  C)   Resp 16   Wt 70.8 kg (156 lb)   LMP 07/22/2020 (Exact  Date)   SpO2 97%   Breastfeeding No   BMI 26.78 kg/m    Weight is 156 lbs 0 oz  Body mass index is 26.78 kg/m .  Orthostatic Vitals       Most Recent      Sitting Orthostatic /88 08/24 0821    Sitting Orthostatic Pulse (bpm) 85 08/24 0821    Standing Orthostatic /87 08/24 0821    Standing Orthostatic Pulse (bpm) 102 08/24 0821            Appearance: awake, alert, adequately groomed and dressed in hospital scrubs  Attitude:  cooperative  Eye Contact:  good  Mood:  depressed  Affect:  intensity is blunted  Speech:  normal prosody  Psychomotor Behavior:  no evidence of tardive dyskinesia, dystonia, or tics  Throught Process:  linear  Associations:  no loose associations  Thought Content:  passive suicidal ideation present and thoughts of self-harm, which are remained the same  Insight:  good  Judgement:  intact  Oriented to:  time, person, and place  Attention Span and Concentration:  intact  Recent and Remote Memory:  fair Recent memory impacted by ECT.    Clinical Global Impressions  First:  Considering your total clinical experience with this particular patient population, how severe are the patient's symptoms at this time?: 7 (08/23/20 1344)  Compared to the patient's condition at the START of treatment, this patient's condition is: 4 (08/23/20 1344)  Most recent:  Considering your total clinical experience with this particular patient population, how severe are the patient's symptoms at this time?: 7 (08/23/20 1344)  Compared to the patient's condition at the START of treatment, this patient's condition is: 4 (08/23/20 1344)         Precautions:     Behavioral Orders   Procedures     Code 1 - Restrict to Unit     Routine Programming     As clinically indicated     Status 15     Every 15 minutes.     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   Stabilization with medication  Patient wants to restart Ketamine trial, reports appt in late Sept  Patient requested IRT's placement until ketamine trial.        Telemedicine Visit: The patient's condition can be safely assessed and treated via synchronous audio and visual telemedicine encounter.     Start time: 1038  Stop time: 1056     Reason for Telemedicine Visit: Covid-19     Originating Site (Patient Location): [facility] Station 4A     Distant Site (Provider Location): Provider home office     Consent:  The patient/guardian has verbally consented to: the potential risks and benefits of telemedicine (video visit) versus in person care; bill my insurance or make self-payment for services provided; and responsibility for payment of non-covered services.      Mode of Communication:  Video Conference via [employer]     As the provider I attest to compliance with applicable laws and regulations related to telemedicine.

## 2020-08-24 NOTE — PROGRESS NOTES
"Pt reports chronic depression and anxiety, both 8/10, with 10 being the worst. Pt reports thoughts of suicide with a plan to specifically take 50,000 mg aspirin. Pt also reports wanting to SIB by cutting. Pt denies AVH/HI. Pt reports that she can be safe here in the hospital. Pt reports that 3 of her coping skills are weighted blankets, her 3 dogs, & crocheting. Pt given weighted blanket, of which she reported that this \"feels good.\" Pt has been isolative to room all evening, mainly sleeping, but did come out for dinner. Pt did not stay in the lounge to watch movies and has not been social with peers. Pt reports that \"there is nothing enjoyable [in life]\" and that she wishes she was dead. RN notified. Pt will continue to be checked on by staff. Pt states that she has no other questions or concerns at this time.        08/23/20 1900   Sleep/Rest/Relaxation   Day/Evening Time Hours napping;resting in bed   Number of hours napping 6 hours  (most of zoraida shift )   Behavioral Health   Hallucinations denies / not responding to hallucinations   Thinking other (see comment)  (fair )   Orientation person: oriented;place: oriented;date: oriented;time: oriented   Memory baseline memory   Insight admits / accepts;poor   Judgement impaired   Eye Contact at examiner   Affect sad   Mood depressed;hopeless   Physical Appearance/Attire untidy   Hygiene other (see comment)  (fair)   Suicidality thoughts and plan   1. Wish to be Dead (Recent) Yes   Wish to be Dead Description (Recent) \"Yes-nothing is enjoyable\"    2. Non-Specific Active Suicidal Thoughts (Recent) Yes   Non-Specific Active Suicidal Thought Description (Recent) Overdose on medications   Enviromental Risk Factors None   Self Injury thoughts only   Elopement   (no concerns)   Activity isolative;withdrawn   Speech clear;coherent   Medication Sensitivity no stated side effects;no observed side effects   Psychomotor / Gait balanced;steady   Psycho Education   Type of " Intervention 1:1 intervention   Response participates with encouragement   Hours 0.5   Treatment Detail check in   Daily Care   Activity activity encouraged   Patient Performed Hygiene other (see comments);dressed  (none )   Activities of Daily Living   Hygiene/Grooming independent   Oral Hygiene independent   Dress scrubs (behavioral health);independent   Laundry unable to complete   Room Organization independent   Activity   Activity Assistance Provided independent   Symptoms Noted During/After Activity none   Hygiene Care Assistance   Hygiene Assistance independent

## 2020-08-25 PROCEDURE — 25000132 ZZH RX MED GY IP 250 OP 250 PS 637: Performed by: CLINICAL NURSE SPECIALIST

## 2020-08-25 PROCEDURE — 99232 SBSQ HOSP IP/OBS MODERATE 35: CPT | Performed by: CLINICAL NURSE SPECIALIST

## 2020-08-25 PROCEDURE — G0177 OPPS/PHP; TRAIN & EDUC SERV: HCPCS

## 2020-08-25 PROCEDURE — 12400001 ZZH R&B MH UMMC

## 2020-08-25 PROCEDURE — 25000132 ZZH RX MED GY IP 250 OP 250 PS 637: Performed by: NURSE PRACTITIONER

## 2020-08-25 PROCEDURE — 25000132 ZZH RX MED GY IP 250 OP 250 PS 637: Performed by: PSYCHIATRY & NEUROLOGY

## 2020-08-25 RX ORDER — OLANZAPINE 5 MG/1
5-10 TABLET, ORALLY DISINTEGRATING ORAL 3 TIMES DAILY PRN
Status: DISCONTINUED | OUTPATIENT
Start: 2020-08-25 | End: 2020-08-26

## 2020-08-25 RX ORDER — ZIPRASIDONE HYDROCHLORIDE 40 MG/1
40 CAPSULE ORAL 2 TIMES DAILY WITH MEALS
Status: DISCONTINUED | OUTPATIENT
Start: 2020-08-25 | End: 2020-08-27

## 2020-08-25 RX ORDER — OLANZAPINE 10 MG/2ML
10 INJECTION, POWDER, FOR SOLUTION INTRAMUSCULAR
Status: DISCONTINUED | OUTPATIENT
Start: 2020-08-25 | End: 2020-08-26

## 2020-08-25 RX ADMIN — DESVENLAFAXINE SUCCINATE 100 MG: 50 TABLET, EXTENDED RELEASE ORAL at 20:57

## 2020-08-25 RX ADMIN — ZIPRASIDONE HYDROCHLORIDE 40 MG: 40 CAPSULE ORAL at 17:34

## 2020-08-25 RX ADMIN — MEMANTINE 10 MG: 10 TABLET ORAL at 20:58

## 2020-08-25 RX ADMIN — FOLIC ACID 5 MG: 1 TABLET ORAL at 09:07

## 2020-08-25 RX ADMIN — TRAZODONE HYDROCHLORIDE 100 MG: 100 TABLET ORAL at 20:57

## 2020-08-25 RX ADMIN — CLONAZEPAM 1 MG: 1 TABLET ORAL at 15:40

## 2020-08-25 RX ADMIN — ZIPRASIDONE HCL 20 MG: 20 CAPSULE ORAL at 09:07

## 2020-08-25 RX ADMIN — HYDROXYZINE HYDROCHLORIDE 50 MG: 25 TABLET, FILM COATED ORAL at 17:34

## 2020-08-25 RX ADMIN — PRAZOSIN HYDROCHLORIDE 1 MG: 1 CAPSULE ORAL at 20:58

## 2020-08-25 RX ADMIN — Medication 1 TABLET: at 20:57

## 2020-08-25 RX ADMIN — MEMANTINE 10 MG: 10 TABLET ORAL at 09:07

## 2020-08-25 ASSESSMENT — ACTIVITIES OF DAILY LIVING (ADL)
HYGIENE/GROOMING: INDEPENDENT
ORAL_HYGIENE: INDEPENDENT
DRESS: INDEPENDENT

## 2020-08-25 NOTE — PROGRESS NOTES
Work Completed: Attended team. Reviewed chart.     Discharge plan or goal: Looking into an IRT's once stabilized                Barriers to discharge: Symptom stabilization.
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
## 2020-08-25 NOTE — PROGRESS NOTES
"Pt denies SIB, states she is having \"unwanted thoughts\" of SI, but that she feels safe on the unit. Pt states she is sleeping extra to help cope. Pt selectively social with peers.      08/25/20 1300   Behavioral Health   Hallucinations denies / not responding to hallucinations   Thinking poor concentration   Orientation person: oriented;place: oriented;date: oriented;time: oriented   Memory baseline memory   Insight admits / accepts   Judgement impaired   Eye Contact at examiner   Affect blunted, flat   Mood mood is calm;depressed   Physical Appearance/Attire attire appropriate to age and situation   Hygiene neglected grooming - unclean body, hair, teeth   Suicidality thoughts only;other (see comments)  (feels safe on the unit)   1. Wish to be Dead (Recent) No   2. Non-Specific Active Suicidal Thoughts (Recent) No   Self Injury other (see comment)  (denies)   Elopement   (no concerning statements or behaviors)   Activity   (visible, social)   Speech coherent   Medication Sensitivity no observed side effects;no stated side effects   Psychomotor / Gait balanced;steady     "

## 2020-08-25 NOTE — PROVIDER NOTIFICATION
"Patient out for breakfast and vital signs.  Gait steady.  Thinking linear and organized.  Reports forehead is \"sore\" . No swelling noted or discolorization.  Denies dizziness, vision problems.  Oriented to person, place, year and president.  Face symmetrical with smile.  PERRL.  EOMs intact.  Bilateral upper extremity strength 5/5 equal.  Denies nausea and eating breakfast.  "

## 2020-08-25 NOTE — PROGRESS NOTES
"Pt was observed sitting in the hallway. Pt took off the socks on her hands and began scratching her arms. Pt began raising her voice \"I can feel him touching me! He is here!\" Writer attempted reality orientating pt to place and situation. Pt was tearful. Pt began head banging against the wall stating \"I know he is here! I feel him!\" Pt refused staff redirection. Weighted blankets, a check-in and ice water was offered. Pt refused all interventions. Pt code was called due to SIB head banging and self scratching behavior.  "

## 2020-08-25 NOTE — PROGRESS NOTES
"[facility], [place]   Psychiatric Progress Note        Interim History:   The patient's care was discussed with the treatment team during the daily team meeting and/or staff's chart notes were reviewed.  Staff report patient is visible in the milieu. Provider met with patient in person on the unit.     Psychiatric symptoms and interventions:  Patient talked about her \"panic attack \" last night. She reports she never knows if it will something she can control or if she will be out of control. Discussed with patient taking either Zyprexa or Klonopin early so that she will be able to practice skills. Patient reports she refused Zyprexa last night because her previous provider did not tell her about weight gain with Zyprexa. She gained 45 pounds . Patient states she will take Zyprexa on the unit when she is in crisis.     Schedule Geodon 40 mg BID- mood stabilization  Will continue folic acid 5 mg until MTHFR gene test results.      Medical: UTOX negative, HCG negative, CMP WNL. Lipid panel elevated, TSH 2.06 WNL, CBC with diff WNL     Behavioral/psychology/social:  Encouraged patient to attend therapeutic hospital programming olerated.   SIB precautions         Medications:       desvenlafaxine  100 mg Oral Daily at 8 pm     folic acid  5 mg Oral Daily     levonorgestrel-ethinyl estradiol  1 tablet Oral Daily at 8 pm     memantine  10 mg Oral BID     prazosin  1 mg Oral At Bedtime     traZODone  100 mg Oral At Bedtime     ziprasidone  20 mg Oral BID w/meals          Allergies:     Allergies   Allergen Reactions     Hydrocodone-Acetaminophen Other (See Comments), Unknown and Nausea and Vomiting     Severe hallucinations.  Patient reports hallucinations   Severe hallucinations  Pt had hallucinations  Patient reports hallucinations   Severe hallucinations  Severe hallucinations.  Pt had hallucinations  Patient reports hallucinations   Patient reports hallucinations        Latex Hives     " Codeine Other (See Comments), Nausea and Vomiting and Unknown     Pt reports having hallucinations.  Pt reports tolerating Tylenol            Labs:   No results found for this or any previous visit (from the past 24 hour(s)).       Psychiatric Examination:     /86   Pulse 82   Temp 98.3  F (36.8  C) (Oral)   Resp 16   Wt 70.8 kg (156 lb)   LMP 07/22/2020 (Exact Date)   SpO2 99%   Breastfeeding No   BMI 26.78 kg/m    Weight is 156 lbs 0 oz  Body mass index is 26.78 kg/m .  Orthostatic Vitals       Most Recent      Sitting Orthostatic BP 97/78 08/24 2042    Sitting Orthostatic Pulse (bpm) 85 08/24 2042    Standing Orthostatic /87 08/24 0821    Standing Orthostatic Pulse (bpm) 102 08/24 0821          Appearance: awake, alert, adequately groomed and dressed in hospital scrubs  Attitude:  cooperative  Eye Contact:  good  Mood:  depressed  Affect:  intensity is blunted  Speech:  normal prosody  Psychomotor Behavior:  no evidence of tardive dyskinesia, dystonia, or tics  Throught Process:  linear  Associations:  no loose associations  Thought Content:  passive suicidal ideation present and thoughts of self-harm, which are remained the same  Insight:  good  Judgement:  intact  Oriented to:  time, person, and place  Attention Span and Concentration:  intact  Recent and Remote Memory:  fair Recent memory impacted by ECT.     Clinical Global Impressions  First:  Considering your total clinical experience with this particular patient population, how severe are the patient's symptoms at this time?: 7 (08/23/20 1344)  Compared to the patient's condition at the START of treatment, this patient's condition is: 4 (08/23/20 1344)  Most recent:  Considering your total clinical experience with this particular patient population, how severe are the patient's symptoms at this time?: 7 (08/23/20 1344)  Compared to the patient's condition at the START of treatment, this patient's condition is: 4 (08/23/20 1344)          Precautions:     Behavioral Orders   Procedures     Code 1 - Restrict to Unit     Routine Programming     As clinically indicated     Self Injury Precaution     Recently had 13 staples removed from leg due to SIB.     Status 15     Every 15 minutes.     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   Stabilization with medication  Patient wants to restart Ketamine trial, reports appt in late Sept  Patient requested IRT's placement until ketamine trial.

## 2020-08-25 NOTE — PROGRESS NOTES
08/24/20 2200   Therapeutic Recreation   Type of Intervention structured groups   Activity game   Response Participates, initiates socially appropriate   Hours 1     Pt attended the structured Therapeutic Recreation group, participating in a group activity. Pt participated in group discussion, leisure participation, and social engagement to gain self-esteem, manage behaviors, improve social skills, decrease isolation, and reduce anxiety/depression.   Pt remained focused and engaged throughout group activity.  Pt mood was sociable and was appropriate with interactions. Pt participated for the majority of the group, contributing to the clues and demonstration for others  turns.

## 2020-08-25 NOTE — PLAN OF CARE
Problem: OT General Care Plan  Goal: OT Goal 1  Description: Pt will practice using >2 coping strategies to manage stress and reduce symptoms to demonstrate increased readiness for discharge.     Pt actively participated in occupational therapy clinic. Pt was able to ask for assistance as needed, and independently initiate a structured task. Pt demonstrated good focus, but appeared more withdrawn in group today, minimally interactive with others. Outside of  group time, writer provided pt with a packet on distraction and alternative strategies to manage self-injury.

## 2020-08-26 PROCEDURE — 25000132 ZZH RX MED GY IP 250 OP 250 PS 637: Performed by: CLINICAL NURSE SPECIALIST

## 2020-08-26 PROCEDURE — 25000132 ZZH RX MED GY IP 250 OP 250 PS 637: Performed by: PSYCHIATRY & NEUROLOGY

## 2020-08-26 PROCEDURE — 25000132 ZZH RX MED GY IP 250 OP 250 PS 637: Performed by: NURSE PRACTITIONER

## 2020-08-26 PROCEDURE — 99232 SBSQ HOSP IP/OBS MODERATE 35: CPT | Mod: GT | Performed by: CLINICAL NURSE SPECIALIST

## 2020-08-26 PROCEDURE — 12400001 ZZH R&B MH UMMC

## 2020-08-26 RX ORDER — DIPHENHYDRAMINE HYDROCHLORIDE 50 MG/ML
50 INJECTION INTRAMUSCULAR; INTRAVENOUS EVERY 6 HOURS PRN
Status: DISCONTINUED | OUTPATIENT
Start: 2020-08-26 | End: 2020-09-02 | Stop reason: HOSPADM

## 2020-08-26 RX ORDER — DIPHENHYDRAMINE HCL 50 MG
50 CAPSULE ORAL 3 TIMES DAILY PRN
Status: DISCONTINUED | OUTPATIENT
Start: 2020-08-26 | End: 2020-09-02 | Stop reason: HOSPADM

## 2020-08-26 RX ORDER — PRAZOSIN HYDROCHLORIDE 1 MG/1
2 CAPSULE ORAL AT BEDTIME
Status: DISCONTINUED | OUTPATIENT
Start: 2020-08-26 | End: 2020-09-02 | Stop reason: HOSPADM

## 2020-08-26 RX ORDER — HALOPERIDOL 5 MG/ML
5-10 INJECTION INTRAMUSCULAR 3 TIMES DAILY PRN
Status: DISCONTINUED | OUTPATIENT
Start: 2020-08-26 | End: 2020-09-02 | Stop reason: HOSPADM

## 2020-08-26 RX ORDER — HALOPERIDOL 5 MG/1
5-10 TABLET ORAL 3 TIMES DAILY PRN
Status: DISCONTINUED | OUTPATIENT
Start: 2020-08-26 | End: 2020-09-02 | Stop reason: HOSPADM

## 2020-08-26 RX ADMIN — TRAZODONE HYDROCHLORIDE 100 MG: 100 TABLET ORAL at 23:58

## 2020-08-26 RX ADMIN — HALOPERIDOL 5 MG: 5 TABLET ORAL at 18:46

## 2020-08-26 RX ADMIN — DIPHENHYDRAMINE HYDROCHLORIDE 50 MG: 50 CAPSULE ORAL at 18:46

## 2020-08-26 RX ADMIN — PRAZOSIN HYDROCHLORIDE 2 MG: 1 CAPSULE ORAL at 20:31

## 2020-08-26 RX ADMIN — ZIPRASIDONE HYDROCHLORIDE 40 MG: 40 CAPSULE ORAL at 17:46

## 2020-08-26 RX ADMIN — HYDROXYZINE HYDROCHLORIDE 50 MG: 25 TABLET, FILM COATED ORAL at 23:58

## 2020-08-26 RX ADMIN — ZIPRASIDONE HYDROCHLORIDE 40 MG: 40 CAPSULE ORAL at 10:48

## 2020-08-26 RX ADMIN — CLONAZEPAM 1 MG: 1 TABLET ORAL at 01:03

## 2020-08-26 RX ADMIN — MEMANTINE 10 MG: 10 TABLET ORAL at 20:31

## 2020-08-26 RX ADMIN — MEMANTINE 10 MG: 10 TABLET ORAL at 10:48

## 2020-08-26 RX ADMIN — FOLIC ACID 5 MG: 1 TABLET ORAL at 10:49

## 2020-08-26 RX ADMIN — Medication 1 TABLET: at 20:31

## 2020-08-26 RX ADMIN — DESVENLAFAXINE SUCCINATE 100 MG: 50 TABLET, EXTENDED RELEASE ORAL at 20:31

## 2020-08-26 RX ADMIN — CLONAZEPAM 1 MG: 1 TABLET ORAL at 16:31

## 2020-08-26 ASSESSMENT — ACTIVITIES OF DAILY LIVING (ADL)
DRESS: PROMPTS
HYGIENE/GROOMING: PROMPTS
LAUNDRY: UNABLE TO COMPLETE
HYGIENE/GROOMING: INDEPENDENT
ORAL_HYGIENE: PROMPTS

## 2020-08-26 NOTE — PROGRESS NOTES
Work Completed: Attended team. Reviewed chart.     Discharge plan or goal: IRTS placement likely.                 Barriers to discharge: Symptom management/stabilization.
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
## 2020-08-26 NOTE — PLAN OF CARE
"Patient slept in this morning.  Awakened for medications.    Appears depressed, slightly disheveled.    The first thing she asked me was \"what if I don't eat\"?  She stated the reason was if she eats it \"will keep me alive\".  She stated she would not drink either.  This writer talked to her about 4 elements for managing mood: food, sleep, medication, and coping skills.  She seemed receptive, but has refused lunch.  Some contradictory statements in that she feels she will not get better, but at the same time wanting to resume ketamine treatments.  Rates her depression as 9, anxiety as 9.  Endorses scratching herself.  If asked if she  had a suicide plan she stated \"I sometimes look for things that are sharp.\"     She denies hallucinations.   Initially reluctant to agree to come to staff if she were unable to keep herself safe--but after talking with Lissette, she did contract for safety.  Encouraged use of Clonopin.   Isolative to her room today thus far.    Reported she awakened last night and needed to take clonopin.  Suggested she take the clonopin with her trazodone at bedtime.  Reports poor concentration.    1300:  Declined lunch.  Reports feeling \"OK\" and is feeling safe.  Declined offer of Clonopin right now.   Counseled with Lissette.  Will continuen to monitor and defer SIO for now.  "

## 2020-08-26 NOTE — PROGRESS NOTES
"Pt states she is able to feel safe on the unit, but that she is having thoughts of suicide. (Nurse notified). Pt stated she would come to staff if thoughts became more serious. Pt was upset because she did not get to watch the movie she wanted to watch. When asked what movie she was interested in so the group could vote on it, pt stated \"I don't care\". Pt was unable to name a movie or genre she was interested in. Pt was lying in the lounge looking frustrated during the movie a peer picked. Pt then went to her room to lie down and would only minimally engage with staff after 1900.      08/25/20 1900   Behavioral Health   Hallucinations denies / not responding to hallucinations   Thinking poor concentration   Orientation person: oriented;place: oriented;date: oriented;time: oriented   Memory baseline memory   Insight poor   Judgement impaired   Eye Contact at examiner   Affect blunted, flat   Mood depressed;irritable   Physical Appearance/Attire disheveled   Hygiene neglected grooming - unclean body, hair, teeth   Suicidality thoughts only;other (see comments)  (states she will come to staff if she feels unsafe)   1. Wish to be Dead (Recent) No   2. Non-Specific Active Suicidal Thoughts (Recent)   (\"unwanted thoughts in my head\")   Self Injury thoughts only   Elopement   (no concerning statements or behaviors)   Activity isolative;withdrawn;refusal   Speech coherent;clear   Medication Sensitivity no observed side effects;no stated side effects   Psychomotor / Gait balanced;steady   Psycho Education   Type of Intervention 1:1 intervention   Response participates with encouragement   Hours 0.5   Safety   Suicidality Status 15   Activities of Daily Living   Hygiene/Grooming independent   Oral Hygiene independent   Dress independent   Room Organization independent   Activity   Activity Assistance Provided independent     "

## 2020-08-26 NOTE — PROGRESS NOTES
[facility], [place]   Psychiatric Progress Note        Interim History:   The patient's care was discussed with the treatment team during the daily team meeting and/or staff's chart notes were reviewed.  Staff report patient is visible in the milieu.     Psychiatric symptoms and interventions:  Patient reports she is depressed and started restricting intake. Patient has anxiety and has been using Klonopin. Patient reports she has been scratching. She is using weighted blanket.    Increased prazosin to 2 mg for anxiety and better sleep.   Schedule Geodon 40 mg BID- mood stabilization  Will continue folic acid 5 mg until MTHFR gene test results.      Medical: UTOX negative, HCG negative, CMP WNL. Lipid panel elevated, TSH 2.06 WNL, CBC with diff WNL     Behavioral/psychology/social:  Encouraged patient to attend therapeutic hospital programming olerated.   SIB precautions  Monitor and record intake.  No roommate       Medications:       desvenlafaxine  100 mg Oral Daily at 8 pm     folic acid  5 mg Oral Daily     levonorgestrel-ethinyl estradiol  1 tablet Oral Daily at 8 pm     memantine  10 mg Oral BID     prazosin  1 mg Oral At Bedtime     traZODone  100 mg Oral At Bedtime     ziprasidone  40 mg Oral BID w/meals          Allergies:     Allergies   Allergen Reactions     Hydrocodone-Acetaminophen Other (See Comments), Unknown and Nausea and Vomiting     Severe hallucinations.  Patient reports hallucinations   Severe hallucinations  Pt had hallucinations  Patient reports hallucinations   Severe hallucinations  Severe hallucinations.  Pt had hallucinations  Patient reports hallucinations   Patient reports hallucinations        Latex Hives     Codeine Other (See Comments), Nausea and Vomiting and Unknown     Pt reports having hallucinations.  Pt reports tolerating Tylenol            Labs:   No results found for this or any previous visit (from the past 24 hour(s)).       Psychiatric  Examination:     /87   Pulse 91   Temp 98.2  F (36.8  C) (Oral)   Resp 16   Wt 88.9 kg (195 lb 14.4 oz)   LMP 07/22/2020 (Exact Date)   SpO2 96%   Breastfeeding No   BMI 33.63 kg/m    Weight is 195 lbs 14.4 oz  Body mass index is 33.63 kg/m .  Orthostatic Vitals       Most Recent      Sitting Orthostatic /84 08/25 0900    Sitting Orthostatic Pulse (bpm) 99 08/25 0900    Standing Orthostatic /84 08/25 0900    Standing Orthostatic Pulse (bpm) 99 08/25 0900          Appearance: awake, alert, adequately groomed and dressed in hospital scrubs  Attitude:  cooperative  Eye Contact:  good  Mood:  depressed  Affect:  intensity is blunted  Speech:  normal prosody  Psychomotor Behavior:  no evidence of tardive dyskinesia, dystonia, or tics  Throught Process:  linear  Associations:  no loose associations  Thought Content:  passive suicidal ideation present and thoughts of self-harm, which are remained the same  Insight:  good  Judgement:  intact  Oriented to:  time, person, and place  Attention Span and Concentration:  intact  Recent and Remote Memory:  fair Recent memory impacted by ECT  Clinical Global Impressions  First:  Considering your total clinical experience with this particular patient population, how severe are the patient's symptoms at this time?: 7 (08/23/20 1344)  Compared to the patient's condition at the START of treatment, this patient's condition is: 4 (08/23/20 1344)  Most recent:  Considering your total clinical experience with this particular patient population, how severe are the patient's symptoms at this time?: 7 (08/23/20 1344)  Compared to the patient's condition at the START of treatment, this patient's condition is: 4 (08/23/20 1344)         Precautions:     Behavioral Orders   Procedures     Code 1 - Restrict to Unit     Routine Programming     As clinically indicated     Self Injury Precaution     Recently had 13 staples removed from leg due to SIB.     Status 15     Every  15 minutes.     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   Stabilization with medication  Patient wants to restart Ketamine trial, reports appt in late Sept  Patient requested IRT's placement until ketamine trial.       Telemedicine Visit: The patient's condition can be safely assessed and treated via synchronous audio and visual telemedicine encounter.     Start time: 1111  Stop time: 1120     Reason for Telemedicine Visit: Covid-19     Originating Site (Patient Location): [facility]     Distant Site (Provider Location): Provider home office     Consent:  The patient/guardian has verbally consented to: the potential risks and benefits of telemedicine (video visit) versus in person care; bill my insurance or make self-payment for services provided; and responsibility for payment of non-covered services.      Mode of Communication:  Video Conference via [employer]     As the provider I attest to compliance with applicable laws and regulations related to telemedicine.

## 2020-08-27 PROCEDURE — 25000132 ZZH RX MED GY IP 250 OP 250 PS 637: Performed by: CLINICAL NURSE SPECIALIST

## 2020-08-27 PROCEDURE — 25000132 ZZH RX MED GY IP 250 OP 250 PS 637: Performed by: NURSE PRACTITIONER

## 2020-08-27 PROCEDURE — 25000132 ZZH RX MED GY IP 250 OP 250 PS 637: Performed by: PSYCHIATRY & NEUROLOGY

## 2020-08-27 PROCEDURE — 99232 SBSQ HOSP IP/OBS MODERATE 35: CPT | Performed by: CLINICAL NURSE SPECIALIST

## 2020-08-27 PROCEDURE — G0177 OPPS/PHP; TRAIN & EDUC SERV: HCPCS

## 2020-08-27 PROCEDURE — 12400001 ZZH R&B MH UMMC

## 2020-08-27 RX ORDER — MIRTAZAPINE 7.5 MG/1
7.5 TABLET, FILM COATED ORAL AT BEDTIME
Status: DISCONTINUED | OUTPATIENT
Start: 2020-08-27 | End: 2020-08-31

## 2020-08-27 RX ADMIN — DESVENLAFAXINE SUCCINATE 100 MG: 50 TABLET, EXTENDED RELEASE ORAL at 19:24

## 2020-08-27 RX ADMIN — MIRTAZAPINE 7.5 MG: 7.5 TABLET, FILM COATED ORAL at 20:30

## 2020-08-27 RX ADMIN — PRAZOSIN HYDROCHLORIDE 2 MG: 1 CAPSULE ORAL at 20:30

## 2020-08-27 RX ADMIN — FOLIC ACID 5 MG: 1 TABLET ORAL at 08:42

## 2020-08-27 RX ADMIN — CLONAZEPAM 1 MG: 1 TABLET ORAL at 16:01

## 2020-08-27 RX ADMIN — MEMANTINE 10 MG: 10 TABLET ORAL at 08:42

## 2020-08-27 RX ADMIN — ZIPRASIDONE HCL 60 MG: 20 CAPSULE ORAL at 17:33

## 2020-08-27 RX ADMIN — Medication 1 TABLET: at 19:25

## 2020-08-27 RX ADMIN — MEMANTINE 10 MG: 10 TABLET ORAL at 19:25

## 2020-08-27 RX ADMIN — ZIPRASIDONE HYDROCHLORIDE 40 MG: 40 CAPSULE ORAL at 08:42

## 2020-08-27 ASSESSMENT — ACTIVITIES OF DAILY LIVING (ADL)
LAUNDRY: UNABLE TO COMPLETE
ORAL_HYGIENE: INDEPENDENT
HYGIENE/GROOMING: INDEPENDENT
ORAL_HYGIENE: PROMPTS
HYGIENE/GROOMING: PROMPTS
DRESS: INDEPENDENT
DRESS: SCRUBS (BEHAVIORAL HEALTH)

## 2020-08-27 NOTE — PROGRESS NOTES
[facility], [place]   Psychiatric Progress Note        Interim History:   The patient's care was discussed with the treatment team during the daily team meeting and/or staff's chart notes were reviewed.  Staff report patient is visible in the milieu. Met with patient in person on the unit.     Psychiatric symptoms and interventions:  Discussed with patient the importance of using coping skills in addition to taking her medications to manage her anxiety symptoms. Discussed with patient increasing Geodon to 60 mg BID. Discussed importance of eating 350 calories to get full absorption and therapeutic effect.     Patient reports trazodone is no longer effect. Ordered Remeron.     Discussed using Haldol and Benadryl for crisis situations so that she does not hurt herself.     Medical: UTOX negative, HCG negative, CMP WNL. Lipid panel elevated, TSH 2.06 WNL, CBC with diff WNL     Behavioral/psychology/social:  Encouraged patient to attend therapeutic hospital programming olerated.   SIB precautions  Monitor and record intake.  No roommate         Medications:       desvenlafaxine  100 mg Oral Daily at 8 pm     folic acid  5 mg Oral Daily     levonorgestrel-ethinyl estradiol  1 tablet Oral Daily at 8 pm     memantine  10 mg Oral BID     prazosin  2 mg Oral At Bedtime     traZODone  100 mg Oral At Bedtime     ziprasidone  40 mg Oral BID w/meals          Allergies:     Allergies   Allergen Reactions     Hydrocodone-Acetaminophen Other (See Comments), Unknown and Nausea and Vomiting     Severe hallucinations.  Patient reports hallucinations   Severe hallucinations  Pt had hallucinations  Patient reports hallucinations   Severe hallucinations  Severe hallucinations.  Pt had hallucinations  Patient reports hallucinations   Patient reports hallucinations        Latex Hives     Codeine Other (See Comments), Nausea and Vomiting and Unknown     Pt reports having hallucinations.  Pt reports tolerating  Tylenol            Labs:   No results found for this or any previous visit (from the past 24 hour(s)).       Psychiatric Examination:     /71   Pulse 82   Temp 98.8  F (37.1  C) (Oral)   Resp 16   Wt 87.4 kg (192 lb 9.6 oz)   LMP 07/22/2020 (Exact Date)   SpO2 96%   Breastfeeding No   BMI 33.06 kg/m    Weight is 192 lbs 9.6 oz  Body mass index is 33.06 kg/m .  Orthostatic Vitals       Most Recent      Sitting Orthostatic /87 08/27 0700    Sitting Orthostatic Pulse (bpm) 108 08/27 0700    Standing Orthostatic /87 08/27 0700    Standing Orthostatic Pulse (bpm) 116 08/27 0700          Appearance: awake, alert, adequately groomed and dressed in hospital scrubs  Attitude:  cooperative  Eye Contact:  good  Mood:  depressed  Affect:  intensity is blunted  Speech:  normal prosody  Psychomotor Behavior:  no evidence of tardive dyskinesia, dystonia, or tics  Throught Process:  linear  Associations:  no loose associations  Thought Content:  passive suicidal ideation present and thoughts of self-harm, which are remained the same  Insight:  good  Judgement:  intact  Oriented to:  time, person, and place  Attention Span and Concentration:  intact  Recent and Remote Memory:  fair Recent memory impacted by ECT  Clinical Global Impressions  First:  Considering your total clinical experience with this particular patient population, how severe are the patient's symptoms at this time?: 7 (08/23/20 1344)  Compared to the patient's condition at the START of treatment, this patient's condition is: 4 (08/23/20 1344)  Most recent:  Considering your total clinical experience with this particular patient population, how severe are the patient's symptoms at this time?: 7 (08/23/20 1344)  Compared to the patient's condition at the START of treatment, this patient's condition is: 4 (08/23/20 1344)         Precautions:     Behavioral Orders   Procedures     Code 1 - Restrict to Unit     Routine Programming     As  clinically indicated     Self Injury Precaution     Recently had 13 staples removed from leg due to SIB. Recent head banging on unit.     Status 15     Every 15 minutes.     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   Stabilization with medication  Patient wants to restart Ketamine trial, reports appt in late Sept  Patient requested IRT's placement until ketamine trial.

## 2020-08-27 NOTE — PLAN OF CARE
"Problem: OT General Care Plan  Goal: OT Goal 1  Description: Pt will practice using >2 coping strategies to manage stress and reduce symptoms to demonstrate increased readiness for discharge.     Pt attended and participated (at times, indirectly) in a structured occupational therapy group session with a focus on gratitude and thankfulness. Pt participated when prompted frequently, but did not voluntarily work on the suggested task. Instead, pt actively working on a word-find, but appeared to benefit from the distraction and group process happening around her. With encouragement and prompts, pt was able to identify the people they are thankful for: \"my pets and my best friend\", but was unable to articulate any current or past situations, or places she is grateful for. At the end of group, pt requested to look through OT supplies. Writer allowed pt to take a coloring book to utilize as a coping skill when outside of group. Pt appreciative and appears interested in this.   "
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
## 2020-08-27 NOTE — PROGRESS NOTES
INITIAL OT ASSESSMENT     08/24/20 1300   General Information   Date Initially Attended OT 08/24/20   Clinical Impression   Affect Flat   Orientation Oriented to person, place and time   Appearance and ADLs Neglected   Attention to Internal Stimuli No observed signs   Interaction Skills Interacts appropriately with staff;Interacts appropriately with peers;Withdrawn   Ability to Communicate Needs Does so with prompts   Verbal Content Clear;Appropriate to topic   Ability to Maintain Boundaries Maintains appropriate verbal boundaries;Maintains appropriate physical boundaries   Participation Participates with frequent encouragement   Concentration Concentrates 10-20 minutes   Ability to Concentrate With structure   Follows and Comprehends Directions Independently follows multi-step directions   Memory Delayed and immediate recall intact   Organization Independently organizes all tasks   Decision Making Independent   Planning and Problem Solving Indentifies problems but not alternatives   Ability to Apply and Learn Concepts Comprehends concepts, but needs assist to apply   Frustrations / Stress Tolerance Independently identifies sources of frustration/stress;Easily frustrated;Utilizes coping skills with prompts   Level of Insight Some insight   Self Esteem Poor self esteem   Social Supports Has knowledge of support systems

## 2020-08-28 PROCEDURE — 25000132 ZZH RX MED GY IP 250 OP 250 PS 637: Performed by: NURSE PRACTITIONER

## 2020-08-28 PROCEDURE — 99232 SBSQ HOSP IP/OBS MODERATE 35: CPT | Performed by: PHYSICIAN ASSISTANT

## 2020-08-28 PROCEDURE — 25000132 ZZH RX MED GY IP 250 OP 250 PS 637: Performed by: PHYSICIAN ASSISTANT

## 2020-08-28 PROCEDURE — 25000132 ZZH RX MED GY IP 250 OP 250 PS 637: Performed by: CLINICAL NURSE SPECIALIST

## 2020-08-28 PROCEDURE — 99207 ZZC CONSULT E&M CHANGED TO SUBSEQUENT LEVEL: CPT | Performed by: PHYSICIAN ASSISTANT

## 2020-08-28 PROCEDURE — H2032 ACTIVITY THERAPY, PER 15 MIN: HCPCS

## 2020-08-28 PROCEDURE — 99232 SBSQ HOSP IP/OBS MODERATE 35: CPT | Mod: GT | Performed by: CLINICAL NURSE SPECIALIST

## 2020-08-28 PROCEDURE — 12400001 ZZH R&B MH UMMC

## 2020-08-28 PROCEDURE — 25000132 ZZH RX MED GY IP 250 OP 250 PS 637: Performed by: PSYCHIATRY & NEUROLOGY

## 2020-08-28 RX ORDER — CIPROFLOXACIN AND DEXAMETHASONE 3; 1 MG/ML; MG/ML
4 SUSPENSION/ DROPS AURICULAR (OTIC) 2 TIMES DAILY
Status: DISCONTINUED | OUTPATIENT
Start: 2020-08-28 | End: 2020-09-02 | Stop reason: HOSPADM

## 2020-08-28 RX ORDER — MONTELUKAST SODIUM 10 MG/1
10 TABLET ORAL AT BEDTIME
Status: DISCONTINUED | OUTPATIENT
Start: 2020-08-28 | End: 2020-09-02 | Stop reason: HOSPADM

## 2020-08-28 RX ADMIN — FOLIC ACID 5 MG: 1 TABLET ORAL at 08:13

## 2020-08-28 RX ADMIN — HALOPERIDOL 5 MG: 5 TABLET ORAL at 16:47

## 2020-08-28 RX ADMIN — ZIPRASIDONE HCL 40 MG: 20 CAPSULE ORAL at 08:12

## 2020-08-28 RX ADMIN — MIRTAZAPINE 7.5 MG: 7.5 TABLET, FILM COATED ORAL at 22:15

## 2020-08-28 RX ADMIN — MONTELUKAST 10 MG: 10 TABLET, FILM COATED ORAL at 22:22

## 2020-08-28 RX ADMIN — DIPHENHYDRAMINE HYDROCHLORIDE 50 MG: 50 CAPSULE ORAL at 16:47

## 2020-08-28 RX ADMIN — Medication 1 TABLET: at 22:15

## 2020-08-28 RX ADMIN — MAGNESIUM HYDROXIDE 30 ML: 400 SUSPENSION ORAL at 22:19

## 2020-08-28 RX ADMIN — HYDROXYZINE HYDROCHLORIDE 25 MG: 25 TABLET, FILM COATED ORAL at 05:43

## 2020-08-28 RX ADMIN — CLONAZEPAM 1 MG: 1 TABLET ORAL at 16:27

## 2020-08-28 RX ADMIN — PRAZOSIN HYDROCHLORIDE 2 MG: 1 CAPSULE ORAL at 22:15

## 2020-08-28 RX ADMIN — CIPROFLOXACIN AND DEXAMETHASONE 4 DROP: 3; 1 SUSPENSION/ DROPS AURICULAR (OTIC) at 14:57

## 2020-08-28 RX ADMIN — MEMANTINE 10 MG: 10 TABLET ORAL at 08:12

## 2020-08-28 RX ADMIN — ZIPRASIDONE HCL 60 MG: 20 CAPSULE ORAL at 18:27

## 2020-08-28 RX ADMIN — MEMANTINE 10 MG: 10 TABLET ORAL at 22:15

## 2020-08-28 RX ADMIN — DESVENLAFAXINE SUCCINATE 100 MG: 50 TABLET, EXTENDED RELEASE ORAL at 22:15

## 2020-08-28 RX ADMIN — CIPROFLOXACIN AND DEXAMETHASONE 4 DROP: 3; 1 SUSPENSION/ DROPS AURICULAR (OTIC) at 22:15

## 2020-08-28 ASSESSMENT — ACTIVITIES OF DAILY LIVING (ADL)
HYGIENE/GROOMING: INDEPENDENT
ORAL_HYGIENE: INDEPENDENT
DRESS: INDEPENDENT

## 2020-08-28 NOTE — PLAN OF CARE
Pt's face to face assessment completed. Pt's recent vital signs, recent labs, review of medications and bodily systems and medical history reviewed. No abnormalities seen that can account for events leading up to events. Pt denies complaints of pain. No noted injuries. Dr Donald notified of face to face assessment.

## 2020-08-28 NOTE — PROGRESS NOTES
Work Completed: Attended team. Reviewed chart.    Discharge plan or goal: TBD. Pt reports she has an appointment in sept to meet with a provider and discuss ketamin treatment.                 Barriers to discharge: Symptom stabilized. Safety Concerns.
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
## 2020-08-28 NOTE — PROGRESS NOTES
"   08/28/20 1700   Behavioral Health   Hallucinations denies / not responding to hallucinations   Thinking distractable   Orientation person: oriented;place: oriented;date: oriented;time: oriented   Memory baseline memory   Insight poor   Judgement impaired   Eye Contact at examiner   Affect blunted, flat   Mood anxious   Physical Appearance/Attire attire appropriate to age and situation   Hygiene well groomed   Suicidality chronic thoughts with no stated plan   1. Wish to be Dead (Recent) Yes   2. Non-Specific Active Suicidal Thoughts (Recent) Yes   Self Injury active;urges   Elopement   (no concerns)   Activity withdrawn   Speech clear;coherent   Medication Sensitivity no observed side effects;no stated side effects   Psychomotor / Gait balanced;steady   Activities of Daily Living   Hygiene/Grooming independent   Oral Hygiene independent   Dress independent   Room Organization independent     Pt was observed head banging in [facility] after a phone call with her father. Staff attempted multiple distraction and deescalation techniques. Pt reported she will hurt herself no matter what. Pt began scratching her inner wrist with her other hand. Pt was escorted to her room when a code was called. Pt began head banging in room and a pillow was placed under her head.    Pt reports chronic thoughts of SI and SIB. Pt reported experiencing nightmares last night and poor sleep. Pt reported eating half her meals on a daily basis. No HI. Pt reported feeling hands on her, and experiencing flashbacks. Pt reported feeling \"I don't know.\" Pt reported sensing \"his presence.\" Pt anxiety is 6/10. Pt prefers staff to check in with her and identified that a sign that she is escalating is when she begins rocking back and forth. Pt is not social with peers on the unit. Pt reported it is easier to talk to staff instead of patients. Pt reports potentially wanting some 1:1 time with an art therapist.  "
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
## 2020-08-28 NOTE — CONSULTS
[facility], [place]  Consult Note - Hospitalist Service     Date of Admission:  8/23/2020  Consult Requested by: Debra Naegele APRN, CNP  Reason for Consult: Ear pain    Assessment & Plan   Yoana [place] is a 20 year old female admitted on 8/23/2020. She has a past medical history significant for MDD, MAILE, PTSD, Anorexia who is admitted to  for psychiatric evaluation. IM was consulted due to right ear pain.    Otitis Externa  Right ear pain for past 2 days. Pain with pinna and tragus manipulation, ear edema and lack of other symptoms concerning for AOM, etiology likely otitis externa. However, unable to completely assess right TM due to edema and wax.   - Ciprodex drops to right ear x 7 days  - Avoid water in ears  - If not improving in 2-3 days or worsening symptoms, please notify IM to re-evaluate    Ceci Soto PA-C  Internal Medicine [facility] Hospitalist  [facility]  Pager: [phone]      ______________________________________________________________________    Chief Complaint   Right ear pain    History is obtained from the patient    History of Present Illness   Yoana [place] is a 20 year old female who is admitted to  for psychiatric care who is seen by IM for right ear pain x 2 days.    She reports it is painful with touch or if she lays on it. Denies aural fullness, changes in hearing or drainage from the ear. No difficulty swallowing or pain with swallowing. Denies fever or chills. No other associated symptoms. Has had both otitis externa and acute otitis media in the past. Feels this is more like externa symptoms, as she typically will have changes in hearing with AOM.     Review of Systems   The 5 point Review of Systems is negative other than noted in the HPI or here.     Past Medical History    I have reviewed this patient's medical history and updated it with pertinent information if needed.   Past Medical History:   Diagnosis Date      Anxiety      Depressive disorder      OCD (obsessive compulsive disorder)      PTSD (post-traumatic stress disorder)        Past Surgical History   I have reviewed this patient's surgical history and updated it with pertinent information if needed.  Past Surgical History:   Procedure Laterality Date     CHOLECYSTECTOMY       ENT SURGERY      tonsillectomy       Social History   I have reviewed this patient's social history and updated it with pertinent information if needed.  Social History     Tobacco Use     Smoking status: Never Smoker     Smokeless tobacco: Never Used   Substance Use Topics     Alcohol use: Yes     Frequency: Never     Comment: drinks socially with parents on weekends     Drug use: Yes     Types: Marijuana       Family History   I have reviewed this patient's family history and updated it with pertinent information if needed.   History reviewed. No pertinent family history.    Allergies   Allergies   Allergen Reactions     Hydrocodone-Acetaminophen Other (See Comments), Unknown and Nausea and Vomiting     Severe hallucinations.  Patient reports hallucinations   Severe hallucinations  Pt had hallucinations  Patient reports hallucinations   Severe hallucinations  Severe hallucinations.  Pt had hallucinations  Patient reports hallucinations   Patient reports hallucinations        Latex Hives     Codeine Other (See Comments), Nausea and Vomiting and Unknown     Pt reports having hallucinations.  Pt reports tolerating Tylenol         Physical Exam   Vital Signs: Temp: 98.1  F (36.7  C) Temp src: Oral BP: 127/85 Pulse: 126     SpO2: 96 % O2 Device: None (Room air)    Weight: 192 lbs 9.6 oz    Constitutional: awake, alert, cooperative, no apparent distress, and appears stated age  Eyes: lids and lashes normal, sclera clear and conjunctiva normal  ENT: normocepalic, without obvious abnormality, atramatic, Right external auditory canal/sandy has tender tragus and external canal and has mild wax noted,  tympanic membranes intact bilaterally, difficulty visualizing right light reflex due to edema and wax  Skin: no bruising or bleeding, normal skin color, texture, turgor and no jaundice

## 2020-08-28 NOTE — PROGRESS NOTES
0543;  1. What PRN did patient receive? Hydroxyzine 25mg     2. What was the patient doing that led to the PRN medication? C/o anxiety 6/10     3. Did they require R/S? no     4. Side effects to PRN medication? None     5. After 1 Hour, patient appeared: Sleeping

## 2020-08-28 NOTE — PLAN OF CARE
Problem: Restraint/Seclusion for Violent Self-Destructive Behavior  Goal: Prevent future episodes of restraint or seclusion  Description: Identify nonphysical alternatives to restraint or seclusion.  Identify additional de-escalation supportive measures to use as alternatives to restraint or seclusion.    Note: Writer attempted to debrief with pt at 1730. Pt stating that she still wants to hurt herself and is unable to contract for safety on the unit at this time or talk about what could have been done differently.    Reapproached pt at 1800 and she stated she was feeling better and was able to agree to safe behaviors on the unit. Pt verbalized understanding of why 5 point restraints were utilized and she was able to state that asking for PRNs earlier in the future might be helpful to avoid escalation in the future. Restraints removed. Pt currently appears calm and eating dinner.

## 2020-08-28 NOTE — PLAN OF CARE
"Pt came out to the nursing station asking for band aid this morning to cover her SIB wounds up on her arms. Pt stated \"If I do not get band aids I will pick at them.\" Writer gave pt 3 large band aids and pt had no further complaints. Pt was medication compliant. For the majority of the morning and afternoon pt isolated in her room and slept. Writer checked in with pt this afternoon and asked if she was feeling suicidal and pt stated \"yes.\" Pt was asked if she had a plan and stated \"no, not here.\" Pt was asked to elaborate but refused. Writer asked pt if she was having any thoughts of self harm and she stated \"no, not really.\" Pt does deny AH, VH, and HI. Pt stated that sleeping helps her with her anxiety. Writer asked pt if she would like any medications for anxiety and pt refused. Writer asked what was causing her anxiety and pt stated \"life.\"   "

## 2020-08-28 NOTE — PROGRESS NOTES
"[facility], [place]   Psychiatric Progress Note        Interim History:   The patient's care was discussed with the treatment team during the daily team meeting and/or staff's chart notes were reviewed.  Staff report patient is visible in the milieu.      Psychiatric symptoms and interventions:  Patient reports she is depressed and has passive suicidal thoughts. She was restricting to her intake to \"kill herself\". She reports she is eating 50 % of her meals. Patient was educated on the importance of eating 350 calories with Geodon for full therapeutic effect. Patient reports urges for self harm but has \"not engaged in SIB\". She is using distraction. Ice and weighted blanket to calm self. Patient reports better sleep with Remeron.     Patient reports trazodone is no longer effective. Ordered Remeron.      Discussed using Haldol and Benadryl for crisis situations so that she does not hurt herself.      Medical: UTOX negative, HCG negative, CMP WNL. Lipid panel elevated, TSH 2.06 WNL, CBC with diff WNL     Behavioral/psychology/social:  Encouraged patient to attend therapeutic hospital programming olerated.   SIB precautions  Monitor and record intake.  No roommate         Medications:       desvenlafaxine  100 mg Oral Daily at 8 pm     folic acid  5 mg Oral Daily     levonorgestrel-ethinyl estradiol  1 tablet Oral Daily at 8 pm     memantine  10 mg Oral BID     mirtazapine  7.5 mg Oral At Bedtime     prazosin  2 mg Oral At Bedtime     ziprasidone  60 mg Oral BID w/meals          Allergies:     Allergies   Allergen Reactions     Hydrocodone-Acetaminophen Other (See Comments), Unknown and Nausea and Vomiting     Severe hallucinations.  Patient reports hallucinations   Severe hallucinations  Pt had hallucinations  Patient reports hallucinations   Severe hallucinations  Severe hallucinations.  Pt had hallucinations  Patient reports hallucinations   Patient reports hallucinations        Latex " Hives     Codeine Other (See Comments), Nausea and Vomiting and Unknown     Pt reports having hallucinations.  Pt reports tolerating Tylenol            Labs:   No results found for this or any previous visit (from the past 24 hour(s)).       Psychiatric Examination:     /85   Pulse 126   Temp 98.1  F (36.7  C) (Oral)   Resp 16   Wt 87.4 kg (192 lb 9.6 oz)   LMP 07/22/2020 (Exact Date)   SpO2 96%   Breastfeeding No   BMI 33.06 kg/m    Weight is 192 lbs 9.6 oz  Body mass index is 33.06 kg/m .  Orthostatic Vitals       Most Recent      Sitting Orthostatic /85 08/28 0700    Sitting Orthostatic Pulse (bpm) 126 08/28 0700    Standing Orthostatic /81 08/28 0700    Standing Orthostatic Pulse (bpm) 154 08/28 0700          Appearance: awake, alert, adequately groomed and dressed in hospital scrubs  Attitude:  cooperative  Eye Contact:  good  Mood:  depressed  Affect:  intensity is blunted  Speech:  normal prosody  Psychomotor Behavior:  no evidence of tardive dyskinesia, dystonia, or tics  Throught Process:  linear  Associations:  no loose associations  Thought Content:  passive suicidal ideation present and thoughts of self-harm, which are remained the same  Insight:  good  Judgement:  intact  Oriented to:  time, person, and place  Attention Span and Concentration:  intact  Recent and Remote Memory:  fair Recent memory impacted by ECT     Clinical Global Impressions  First:  Considering your total clinical experience with this particular patient population, how severe are the patient's symptoms at this time?: 7 (08/23/20 1344)  Compared to the patient's condition at the START of treatment, this patient's condition is: 4 (08/23/20 1344)  Most recent:  Considering your total clinical experience with this particular patient population, how severe are the patient's symptoms at this time?: 7 (08/23/20 1344)  Compared to the patient's condition at the START of treatment, this patient's condition is: 4  (08/23/20 1813)         Precautions:     Behavioral Orders   Procedures     Code 1 - Restrict to Unit     Routine Programming     As clinically indicated     Self Injury Precaution     Recently had 13 staples removed from leg due to SIB. Recent head banging on unit.     Status 15     Every 15 minutes.     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   Stabilization with medication  Patient wants to restart Ketamine trial, reports appt in late Sept  Patient requested IRT's placement until ketamine trial.        Telemedicine Visit: The patient's condition can be safely assessed and treated via synchronous audio and visual telemedicine encounter.     Start time: 1121  Stop time: 1129     Reason for Telemedicine Visit: Covid-19     Originating Site (Patient Location): [facility]     Distant Site (Provider Location): Provider home office     Consent:  The patient/guardian has verbally consented to: the potential risks and benefits of telemedicine (video visit) versus in person care; bill my insurance or make self-payment for services provided; and responsibility for payment of non-covered services.      Mode of Communication:  Video Conference via [employer]     As the provider I attest to compliance with applicable laws and regulations related to telemedicine.

## 2020-08-29 PROCEDURE — 25000132 ZZH RX MED GY IP 250 OP 250 PS 637: Performed by: PSYCHIATRY & NEUROLOGY

## 2020-08-29 PROCEDURE — 25000132 ZZH RX MED GY IP 250 OP 250 PS 637: Performed by: CLINICAL NURSE SPECIALIST

## 2020-08-29 PROCEDURE — 12400001 ZZH R&B MH UMMC

## 2020-08-29 PROCEDURE — 25000132 ZZH RX MED GY IP 250 OP 250 PS 637: Performed by: NURSE PRACTITIONER

## 2020-08-29 RX ORDER — DOCUSATE SODIUM 100 MG/1
100 CAPSULE, LIQUID FILLED ORAL DAILY PRN
Status: DISCONTINUED | OUTPATIENT
Start: 2020-08-29 | End: 2020-09-02 | Stop reason: HOSPADM

## 2020-08-29 RX ADMIN — CIPROFLOXACIN AND DEXAMETHASONE 4 DROP: 3; 1 SUSPENSION/ DROPS AURICULAR (OTIC) at 21:24

## 2020-08-29 RX ADMIN — ACETAMINOPHEN 650 MG: 325 TABLET, FILM COATED ORAL at 08:42

## 2020-08-29 RX ADMIN — ACETAMINOPHEN 650 MG: 325 TABLET, FILM COATED ORAL at 21:24

## 2020-08-29 RX ADMIN — MEMANTINE 10 MG: 10 TABLET ORAL at 08:40

## 2020-08-29 RX ADMIN — ZIPRASIDONE HCL 60 MG: 20 CAPSULE ORAL at 08:40

## 2020-08-29 RX ADMIN — DESVENLAFAXINE SUCCINATE 100 MG: 50 TABLET, EXTENDED RELEASE ORAL at 21:24

## 2020-08-29 RX ADMIN — DOCUSATE SODIUM 100 MG: 100 CAPSULE, LIQUID FILLED ORAL at 21:24

## 2020-08-29 RX ADMIN — MONTELUKAST 10 MG: 10 TABLET, FILM COATED ORAL at 21:24

## 2020-08-29 RX ADMIN — DIPHENHYDRAMINE HYDROCHLORIDE 50 MG: 50 CAPSULE ORAL at 17:30

## 2020-08-29 RX ADMIN — CLONAZEPAM 1 MG: 1 TABLET ORAL at 14:26

## 2020-08-29 RX ADMIN — HALOPERIDOL 5 MG: 5 TABLET ORAL at 17:30

## 2020-08-29 RX ADMIN — Medication 1 TABLET: at 21:24

## 2020-08-29 RX ADMIN — MIRTAZAPINE 7.5 MG: 7.5 TABLET, FILM COATED ORAL at 21:24

## 2020-08-29 RX ADMIN — MEMANTINE 10 MG: 10 TABLET ORAL at 21:24

## 2020-08-29 RX ADMIN — FOLIC ACID 5 MG: 1 TABLET ORAL at 14:29

## 2020-08-29 RX ADMIN — ZIPRASIDONE HCL 60 MG: 20 CAPSULE ORAL at 17:47

## 2020-08-29 RX ADMIN — MAGNESIUM HYDROXIDE 30 ML: 400 SUSPENSION ORAL at 17:53

## 2020-08-29 RX ADMIN — PRAZOSIN HYDROCHLORIDE 2 MG: 1 CAPSULE ORAL at 21:24

## 2020-08-29 RX ADMIN — CIPROFLOXACIN AND DEXAMETHASONE 4 DROP: 3; 1 SUSPENSION/ DROPS AURICULAR (OTIC) at 08:41

## 2020-08-29 ASSESSMENT — ACTIVITIES OF DAILY LIVING (ADL)
HYGIENE/GROOMING: INDEPENDENT
ORAL_HYGIENE: INDEPENDENT
DRESS: INDEPENDENT

## 2020-08-29 NOTE — PLAN OF CARE
"Pt came out to the [facility] this morning for breakfast. Pt came to the nursing station this morning complaining of her band aids coming up. Pt stated that she cant help but pick at them. Writer had pt remove the band aids. Pt wound was cleansed, dried, and applied non-adhesive dressing. Pt has been out in the [facility] socializing with peers and making her needs known. Pt was medication compliant. Writer checked in with pt this afternoon and asked if she was feeling suicidal and pt stated \"yes.\" Pt was asked if she had a plan and she stated \"no.\" Pt denies SIB, AH, VH, and HI. Pt denies an medical issues at this time.   "

## 2020-08-29 NOTE — PROGRESS NOTES
08/28/20 [facility]   Therapeutic Recreation   Type of Intervention structured groups   Activity game   Response Participates, initiates socially appropriate   Hours 1     Pt participated in Therapeutic Recreation group with focus on leisure participation, communication skills, and critical thinking. Engaged and focused in the group recreational activity via a group game.  Pt was a full participant throughout the entire duration of group.  Appropriately shared sense of humor with peers during group and appeared to brighten with social interaction. Pt was quiet at the beginning of the group, but perked up and got a bright affect when she heard the topic of the game. Pt was able to keep herself composed through the end of the group as there was another patient, whom was not in the group, that was loud and distracting others.

## 2020-08-30 PROCEDURE — 25000132 ZZH RX MED GY IP 250 OP 250 PS 637: Performed by: PSYCHIATRY & NEUROLOGY

## 2020-08-30 PROCEDURE — 12400001 ZZH R&B MH UMMC

## 2020-08-30 PROCEDURE — 25000132 ZZH RX MED GY IP 250 OP 250 PS 637: Performed by: NURSE PRACTITIONER

## 2020-08-30 PROCEDURE — 25000132 ZZH RX MED GY IP 250 OP 250 PS 637: Performed by: CLINICAL NURSE SPECIALIST

## 2020-08-30 RX ADMIN — ZIPRASIDONE HCL 60 MG: 20 CAPSULE ORAL at 08:53

## 2020-08-30 RX ADMIN — MONTELUKAST 10 MG: 10 TABLET, FILM COATED ORAL at 21:34

## 2020-08-30 RX ADMIN — HALOPERIDOL 5 MG: 5 TABLET ORAL at 20:47

## 2020-08-30 RX ADMIN — DOCUSATE SODIUM 100 MG: 100 CAPSULE, LIQUID FILLED ORAL at 17:38

## 2020-08-30 RX ADMIN — HYDROXYZINE HYDROCHLORIDE 25 MG: 25 TABLET, FILM COATED ORAL at 09:16

## 2020-08-30 RX ADMIN — PRAZOSIN HYDROCHLORIDE 2 MG: 1 CAPSULE ORAL at 21:34

## 2020-08-30 RX ADMIN — CIPROFLOXACIN AND DEXAMETHASONE 4 DROP: 3; 1 SUSPENSION/ DROPS AURICULAR (OTIC) at 21:34

## 2020-08-30 RX ADMIN — MIRTAZAPINE 7.5 MG: 7.5 TABLET, FILM COATED ORAL at 21:34

## 2020-08-30 RX ADMIN — DIPHENHYDRAMINE HYDROCHLORIDE 50 MG: 50 CAPSULE ORAL at 12:24

## 2020-08-30 RX ADMIN — DESVENLAFAXINE SUCCINATE 100 MG: 50 TABLET, EXTENDED RELEASE ORAL at 21:34

## 2020-08-30 RX ADMIN — MEMANTINE 10 MG: 10 TABLET ORAL at 21:34

## 2020-08-30 RX ADMIN — FOLIC ACID 5 MG: 1 TABLET ORAL at 08:53

## 2020-08-30 RX ADMIN — MEMANTINE 10 MG: 10 TABLET ORAL at 08:54

## 2020-08-30 RX ADMIN — CIPROFLOXACIN AND DEXAMETHASONE 4 DROP: 3; 1 SUSPENSION/ DROPS AURICULAR (OTIC) at 08:54

## 2020-08-30 RX ADMIN — DIPHENHYDRAMINE HYDROCHLORIDE 50 MG: 50 CAPSULE ORAL at 20:47

## 2020-08-30 RX ADMIN — CLONAZEPAM 1 MG: 1 TABLET ORAL at 09:16

## 2020-08-30 RX ADMIN — CLONAZEPAM 1 MG: 1 TABLET ORAL at 17:38

## 2020-08-30 RX ADMIN — HALOPERIDOL 10 MG: 5 TABLET ORAL at 12:24

## 2020-08-30 RX ADMIN — Medication 1 TABLET: at 21:35

## 2020-08-30 RX ADMIN — ZIPRASIDONE HCL 60 MG: 20 CAPSULE ORAL at 17:38

## 2020-08-30 ASSESSMENT — ACTIVITIES OF DAILY LIVING (ADL)
ORAL_HYGIENE: INDEPENDENT
HYGIENE/GROOMING: INDEPENDENT
DRESS: INDEPENDENT

## 2020-08-30 NOTE — PROGRESS NOTES
Pt continues to report constipation. PRN Milk of Magnesia administered again this evening and pt given prune juice without results. PRN colace ordered and administered. Will continue to monitor for BM.    Also of note, PRN Haldol and benadryl were administered earlier in the shift, prior to behavior escalation and reported feeling much better than other evenings. Pt appeared brighter and calmer throughout the shift.

## 2020-08-30 NOTE — PLAN OF CARE
"Pt came out to the lounge to eat breakfast this morning. She socialized with peers and watched tv. She asked for her medications and requested lactaid milk. The kitchen was called for her request but stated that they were out of lactaid milk and offered soy milk. Pt agreed to the soy milk. Writer cleansed and dried pt wounds on her left forearm. No bleeding, drainage or infection present. At 0900 after breakfast pt came to the nursing station and stated that she threw up her breakfast. Pt also stated that she had to lower herself to the ground because she was so dizzy. Writer took pt vitals and they were all in normal limits. Writer asked pt to try to drink water and fluids to help with her orthostatic hypotension and pt stated \"I dont drink water or fluids. I havent eaten or drank the whole time here.\" Pt says this with a smile on her face. Writer saw pt eat 50% of her lunch this evening. For the remainder of the afternoon pt alternated between her room and [facility]. Writer checked in with her this afternoon and asked if she was feeling suicidal and pt stated \"yes, but no plan.\" Pt also is denying SIB, AH, VH, and HI. Pt stated that she does have a difficult time not picking at her wounds when they are not bandaged. Writer asked pt if she had any medical issues to report at this time and pt stated \"no.\" Pt received prn clonazepam and atarax at 0916. At 1224 pt came back to the nursing station complaining of agitation and anxiety. PRN haldol and benadryl were administered. Pt returned to her room and slept the rest of the shift.  "

## 2020-08-30 NOTE — PROGRESS NOTES
08/29/20 1900   Behavioral Health   Hallucinations denies / not responding to hallucinations   Thinking intact   Orientation person: oriented;place: oriented;date: oriented;time: oriented   Memory baseline memory   Insight other (see comment)  (fair)   Judgement impaired   Eye Contact at examiner   Affect blunted, flat   Mood mood is calm   Physical Appearance/Attire attire appropriate to age and situation   Hygiene well groomed   Suicidality chronic thoughts with no stated plan   1. Wish to be Dead (Recent) No   2. Non-Specific Active Suicidal Thoughts (Recent) No   Self Injury urges;safety plan   Elopement   (no concerns)   Activity other (see comment)  (pt is visible and social in milieu)   Speech coherent;clear   Medication Sensitivity no observed side effects;no stated side effects   Psychomotor / Gait balanced;steady   Activities of Daily Living   Hygiene/Grooming independent   Oral Hygiene independent   Dress independent   Room Organization independent     Pt reported her sleep was better than yesterday except for waking up early due to a code on the unit. Pt reported she napped later in the day. Pt reported using PRNs, napping, coloring and word finds as coping skills. Pt was social and visible in the milieu. Pt attended group and was an active participant. Pt appears to have a blunted, flat affect but brightens on approach. Pt denied any concerns. Pt has SIB urges but contracts for safety at this time.

## 2020-08-31 PROCEDURE — 25000132 ZZH RX MED GY IP 250 OP 250 PS 637: Performed by: NURSE PRACTITIONER

## 2020-08-31 PROCEDURE — 25000132 ZZH RX MED GY IP 250 OP 250 PS 637: Performed by: PSYCHIATRY & NEUROLOGY

## 2020-08-31 PROCEDURE — 12400001 ZZH R&B MH UMMC

## 2020-08-31 PROCEDURE — 25000132 ZZH RX MED GY IP 250 OP 250 PS 637: Performed by: CLINICAL NURSE SPECIALIST

## 2020-08-31 PROCEDURE — 99232 SBSQ HOSP IP/OBS MODERATE 35: CPT | Mod: GT | Performed by: CLINICAL NURSE SPECIALIST

## 2020-08-31 RX ORDER — CHOLECALCIFEROL (VITAMIN D3) 125 MCG
6000 CAPSULE ORAL
Status: DISCONTINUED | OUTPATIENT
Start: 2020-08-31 | End: 2020-09-02 | Stop reason: HOSPADM

## 2020-08-31 RX ORDER — MIRTAZAPINE 15 MG/1
15 TABLET, FILM COATED ORAL AT BEDTIME
Status: DISCONTINUED | OUTPATIENT
Start: 2020-08-31 | End: 2020-09-02 | Stop reason: HOSPADM

## 2020-08-31 RX ADMIN — HYDROXYZINE HYDROCHLORIDE 50 MG: 25 TABLET, FILM COATED ORAL at 06:35

## 2020-08-31 RX ADMIN — CIPROFLOXACIN AND DEXAMETHASONE 4 DROP: 3; 1 SUSPENSION/ DROPS AURICULAR (OTIC) at 09:06

## 2020-08-31 RX ADMIN — CIPROFLOXACIN AND DEXAMETHASONE 4 DROP: 3; 1 SUSPENSION/ DROPS AURICULAR (OTIC) at 21:17

## 2020-08-31 RX ADMIN — DIPHENHYDRAMINE HYDROCHLORIDE 50 MG: 50 CAPSULE ORAL at 05:00

## 2020-08-31 RX ADMIN — ZIPRASIDONE HCL 60 MG: 20 CAPSULE ORAL at 09:05

## 2020-08-31 RX ADMIN — Medication 1 TABLET: at 21:17

## 2020-08-31 RX ADMIN — DESVENLAFAXINE SUCCINATE 100 MG: 50 TABLET, EXTENDED RELEASE ORAL at 21:17

## 2020-08-31 RX ADMIN — ZIPRASIDONE HCL 60 MG: 20 CAPSULE ORAL at 18:00

## 2020-08-31 RX ADMIN — CLONAZEPAM 1 MG: 1 TABLET ORAL at 13:41

## 2020-08-31 RX ADMIN — HALOPERIDOL 10 MG: 5 TABLET ORAL at 05:00

## 2020-08-31 RX ADMIN — HALOPERIDOL 5 MG: 5 TABLET ORAL at 09:05

## 2020-08-31 RX ADMIN — HYDROXYZINE HYDROCHLORIDE 50 MG: 25 TABLET, FILM COATED ORAL at 13:42

## 2020-08-31 RX ADMIN — DIPHENHYDRAMINE HYDROCHLORIDE 50 MG: 50 CAPSULE ORAL at 09:08

## 2020-08-31 RX ADMIN — HYDROXYZINE HYDROCHLORIDE 50 MG: 25 TABLET, FILM COATED ORAL at 00:26

## 2020-08-31 RX ADMIN — MEMANTINE 10 MG: 10 TABLET ORAL at 21:17

## 2020-08-31 RX ADMIN — CLONAZEPAM 1 MG: 1 TABLET ORAL at 09:05

## 2020-08-31 RX ADMIN — MIRTAZAPINE 15 MG: 15 TABLET, FILM COATED ORAL at 21:18

## 2020-08-31 RX ADMIN — PRAZOSIN HYDROCHLORIDE 2 MG: 1 CAPSULE ORAL at 21:17

## 2020-08-31 RX ADMIN — FOLIC ACID 5 MG: 1 TABLET ORAL at 09:05

## 2020-08-31 RX ADMIN — MONTELUKAST 10 MG: 10 TABLET, FILM COATED ORAL at 21:17

## 2020-08-31 RX ADMIN — HYDROXYZINE HYDROCHLORIDE 50 MG: 25 TABLET, FILM COATED ORAL at 21:21

## 2020-08-31 RX ADMIN — MEMANTINE 10 MG: 10 TABLET ORAL at 09:05

## 2020-08-31 RX ADMIN — LACTASE TAB 3000 UNIT 6000 UNITS: 3000 TAB at 18:00

## 2020-08-31 RX ADMIN — HALOPERIDOL 10 MG: 5 TABLET ORAL at 13:41

## 2020-08-31 ASSESSMENT — ACTIVITIES OF DAILY LIVING (ADL)
DRESS: INDEPENDENT
DRESS: INDEPENDENT
HYGIENE/GROOMING: INDEPENDENT
ORAL_HYGIENE: INDEPENDENT
ORAL_HYGIENE: INDEPENDENT
HYGIENE/GROOMING: INDEPENDENT

## 2020-08-31 NOTE — PROGRESS NOTES
"Pt approached writer at 2015, stating \"The stars that come out of the side of my eyes are taking longer to go away.\" Pt presenting with a bright affect at this time. Writer asked pt if she was having dizziness while standing and pt stated \"No, after I throw up.\" Pt reports she has been throwing up frequently while admitted. Writer asked pt if the emesis was still in her toilet. Pt stated \"Why would it be in my toilet?\" Writer explained importance of being able to assess emesis. Pt then stated, \"I heard I am on I&Os.\" Pt appears amused by all of this. Pt proceeded to discuss how in the past when her door has been locked after meals, she has vomited in community garbage cans and plants. No episodes of emesis have been witnessed on the unit and pt has been eating most of her meals. Pt also states she still has not had a bowel movement and states she has not urinated today, but it is unclear whether pt is a reliable historian. Pt had also stated she has not been drinking any fluids, but she has been observed with fluids throughout the shift. Bowel assessment performed; Bowel sounds active in all four quadrants, no pain or tenderness on palpation. Abdomen soft to the touch and no distension noted. Pt reports passing gas. Pt does not appear to be an any discomfort.     At 2045, pt requested a PRN \"to help me not freak out.\" Pt unable to identify any particular trigger, stating that her agitation just came out of nowhere. PRN Haldol and benadryl administered. Pt worked on a word find and requested tea afterward.  "

## 2020-08-31 NOTE — PROGRESS NOTES
"[facility], [place]   Psychiatric Progress Note        Interim History:   The patient's care was discussed with the treatment team during the daily team meeting and/or staff's chart notes were reviewed.  Staff report patient is visible in the milieu    Psychiatric symptoms and interventions:  Patient had incident of head banging over the weekend. Stating, \"I could feel his hands on me.\"  Encouraged patient to ask for PRN medication early when she is feeling distraught and use coping skills. Patient has been laying in bed most of the day. She needs encouragment to use ocping skills.       1). Nutrition consult because she is purging and restricting. Nursing is reporting no evidence of purging. Patient requested regular diet with Lactaid.   2). Ordered daily bowel assessment; Patient is reporting irregular bowel movements.   3). Reported restless sleep- increased Remeron to 15 mg.   4). Encourage patient to use coping skills.     Medical: UTOX negative, HCG negative, CMP WNL. Lipid panel elevated, TSH 2.06 WNL, CBC with diff WNL  Daily bowel assessments- irregular BM     Behavioral/psychology/social:  Encouraged patient to attend therapeutic hospital programming olerated.   SIB precautions  Monitor and record intake.  No roommate  Patient has had 2 head banging incidents requiring seclusion in order to calm. Haldol and Benadryl has been ordered instead of Zyprexa which patient has refused         Medications:       ciprofloxacin-dexamethasone  4 drop Right Ear BID     desvenlafaxine  100 mg Oral Daily at 8 pm     folic acid  5 mg Oral Daily     levonorgestrel-ethinyl estradiol  1 tablet Oral Daily at 8 pm     memantine  10 mg Oral BID     mirtazapine  7.5 mg Oral At Bedtime     montelukast  10 mg Oral At Bedtime     prazosin  2 mg Oral At Bedtime     ziprasidone  60 mg Oral BID w/meals          Allergies:     Allergies   Allergen Reactions     Hydrocodone-Acetaminophen Other (See " Comments), Unknown and Nausea and Vomiting     Severe hallucinations.  Patient reports hallucinations   Severe hallucinations  Pt had hallucinations  Patient reports hallucinations   Severe hallucinations  Severe hallucinations.  Pt had hallucinations  Patient reports hallucinations   Patient reports hallucinations        Latex Hives     Codeine Other (See Comments), Nausea and Vomiting and Unknown     Pt reports having hallucinations.  Pt reports tolerating Tylenol            Labs:   No results found for this or any previous visit (from the past 24 hour(s)).       Psychiatric Examination:     /89   Pulse 90   Temp 98.5  F (36.9  C) (Oral)   Resp 16   Wt 88.7 kg (195 lb 9.6 oz)   LMP 07/22/2020 (Exact Date)   SpO2 98%   Breastfeeding No   BMI 33.57 kg/m    Weight is 195 lbs 9.6 oz  Body mass index is 33.57 kg/m .  Orthostatic Vitals       Most Recent      Sitting Orthostatic /86 08/31 0900    Sitting Orthostatic Pulse (bpm) 92 08/31 0900    Standing Orthostatic /89 08/31 0900    Standing Orthostatic Pulse (bpm) 118 08/31 0900      Appearance: awake, alert, adequately groomed and dressed in hospital scrubs  Attitude:  cooperative  Eye Contact:  good  Mood:  depressed  Affect:  intensity is blunted  Speech:  normal prosody  Psychomotor Behavior:  no evidence of tardive dyskinesia, dystonia, or tics  Throught Process:  linear  Associations:  no loose associations  Thought Content:  passive suicidal ideation present and thoughts of self-harm, which are remained the same  Insight:  good  Judgement:  intact  Oriented to:  time, person, and place  Attention Span and Concentration:  intact    Clinical Global Impressions  First:  Considering your total clinical experience with this particular patient population, how severe are the patient's symptoms at this time?: 7 (08/23/20 1344)  Compared to the patient's condition at the START of treatment, this patient's condition is: 4 (08/23/20 1344)  Most  recent:  Considering your total clinical experience with this particular patient population, how severe are the patient's symptoms at this time?: 7 (08/23/20 1344)  Compared to the patient's condition at the START of treatment, this patient's condition is: 4 (08/23/20 1344)         Precautions:     Behavioral Orders   Procedures     Code 1 - Restrict to Unit     Routine Programming     As clinically indicated     Self Injury Precaution     Recently had 13 staples removed from leg due to SIB. Recent head banging on unit.     Status 15     Every 15 minutes.     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   Stabilization with medication  Patient wants to restart Ketamine trial, reports appt in late Sept  Patient requested IRT's placement until ketamine trial.        Telemedicine Visit: The patient's condition can be safely assessed and treated via synchronous audio and visual telemedicine encounter.     Start time: 1145  Stop time: 1155     Reason for Telemedicine Visit: Covid-19     Originating Site (Patient Location): [facility]     Distant Site (Provider Location): Provider home office     Consent:  The patient/guardian has verbally consented to: the potential risks and benefits of telemedicine (video visit) versus in person care; bill my insurance or make self-payment for  services provided; and responsibility for payment of non-covered services.      Mode of Communication:  Video Conference via [employer]     As the provider I attest to compliance with applicable laws and regulations related to telemedicine.

## 2020-08-31 NOTE — PROGRESS NOTES
"Patient slept only 4 hours this night. Patient was up at 0023 requesting Hydroxyzine 50 mg and given. Sleep is disruptive. Patient returns to nursing station at 0500 requesting Haldol and Benadryl for anxiety and given. Returns to nursing station at 0630 stating the meds have not helped her at all. States she is having \"bad thoughts, racing thoughts and suicidal thoughts.\" Patient once again requests Hydroxyzine 50 mg and given. Patient is asked if she can keep herself safe and she states, \"Yes, I can be safe.\" Reminded to approach staff if she is feeling unsafe at any time. Continue on status 15.  "

## 2020-08-31 NOTE — PLAN OF CARE
"Pt has wilian alternating between room and lounge for the majority of the morning and afternoon. Pt is medication compliant and states this morning that she is having anxiety. PRN Benadryl, Clonazapam, and haldol were administered at 0905. Pt ate breakfast and met with the provider this morning. Pt became upset after meeting with the provider in regards to how she should use her coping skills when she is having increased anxiety.\" Pt complained of receiving a lactose free diet and wanted lactaid with her food. Writer contacted provider and order was placed. Pt stated today that \"nothing is going well today. I could not sleep last night, I am frustrated, and my provide is a bitch.\" Writer asked pt if she would like any PRNs and pt accepted. PRN clonazepam, haldol, and atarax were administered at 1341. Writer checked in with pt this afternoon and asked if pt was feeling suicidal and pt stated \"yes.\" Writer asked if she had a plan and pt stated \"no.\" Pt denies SIB, AH, VH, and HI. Pt ate her 75% of her lunch. Pt denies any medical issues but states \"I haven't had a bowel movement since I have been here.\"   "

## 2020-08-31 NOTE — PROGRESS NOTES
08/30/20 [facility] [place]   Therapeutic Recreation   Type of Intervention structured groups   Activity game   Response Participates, initiates socially appropriate       Pt briefly attended the Therapeutic Recreation group with focus on leisure participation and social engagement.  Pt participated in the group activity for several minutes before leaving group without saying anything.

## 2020-08-31 NOTE — PROGRESS NOTES
Work Completed: Attended team. Review chart.    Discharge plan or goal: Home with services. Pt has appointment to discuss re-starting Ketamine.                 Barriers to discharge: Safety concerns. Symptom stabilization.

## 2020-08-31 NOTE — PROGRESS NOTES
08/30/20 1800   Behavioral Health   Hallucinations denies / not responding to hallucinations   Thinking intact   Orientation place: oriented;person: oriented;date: oriented;time: oriented   Memory baseline memory   Insight other (see comment)  (fair)   Judgement intact   Eye Contact at examiner   Affect blunted, flat   Mood mood is calm   Physical Appearance/Attire attire appropriate to age and situation   Hygiene well groomed   Suicidality chronic thoughts with no stated plan   1. Wish to be Dead (Recent) No   2. Non-Specific Active Suicidal Thoughts (Recent) No   Self Injury urges   Elopement   (no concerns)   Activity withdrawn   Speech coherent;clear   Medication Sensitivity no observed side effects;no stated side effects   Psychomotor / Gait steady;balanced   Nutrition   Intake (%) 100%   Activities of Daily Living   Hygiene/Grooming independent   Oral Hygiene independent   Dress independent   Room Organization independent     Pt originally checks in feeling great and denies any concerns aside from peers in the lounge talking about heavy drug use. Pt went to her room to nap as one of her coping skills. Pt reported she believes she will be staying here on the unit about one month until her appointment in order to stay safe. Pt later in the evening checked in having a hard time. Pt took a PRN and talked with staff. Pt denies any other concerns. Pt was observed coloring and social with select peers. Pt mood appears calm.

## 2020-09-01 LAB — COPATH REPORT: NORMAL

## 2020-09-01 PROCEDURE — 99232 SBSQ HOSP IP/OBS MODERATE 35: CPT | Performed by: CLINICAL NURSE SPECIALIST

## 2020-09-01 PROCEDURE — 12400001 ZZH R&B MH UMMC

## 2020-09-01 PROCEDURE — 25000132 ZZH RX MED GY IP 250 OP 250 PS 637: Performed by: PSYCHIATRY & NEUROLOGY

## 2020-09-01 PROCEDURE — 25000132 ZZH RX MED GY IP 250 OP 250 PS 637: Performed by: CLINICAL NURSE SPECIALIST

## 2020-09-01 PROCEDURE — H2032 ACTIVITY THERAPY, PER 15 MIN: HCPCS

## 2020-09-01 PROCEDURE — 25000132 ZZH RX MED GY IP 250 OP 250 PS 637: Performed by: NURSE PRACTITIONER

## 2020-09-01 RX ORDER — ZIPRASIDONE HYDROCHLORIDE 20 MG/1
20 CAPSULE ORAL ONCE
Status: DISCONTINUED | OUTPATIENT
Start: 2020-09-01 | End: 2020-09-01

## 2020-09-01 RX ORDER — ZIPRASIDONE HYDROCHLORIDE 20 MG/1
20 CAPSULE ORAL 2 TIMES DAILY WITH MEALS
Status: DISCONTINUED | OUTPATIENT
Start: 2020-09-02 | End: 2020-09-01

## 2020-09-01 RX ADMIN — LACTASE TAB 3000 UNIT 6000 UNITS: 3000 TAB at 09:02

## 2020-09-01 RX ADMIN — MIRTAZAPINE 15 MG: 15 TABLET, FILM COATED ORAL at 20:38

## 2020-09-01 RX ADMIN — MEMANTINE 10 MG: 10 TABLET ORAL at 20:38

## 2020-09-01 RX ADMIN — HALOPERIDOL 5 MG: 5 TABLET ORAL at 20:38

## 2020-09-01 RX ADMIN — FOLIC ACID 5 MG: 1 TABLET ORAL at 09:02

## 2020-09-01 RX ADMIN — DIPHENHYDRAMINE HYDROCHLORIDE 50 MG: 50 CAPSULE ORAL at 20:38

## 2020-09-01 RX ADMIN — DESVENLAFAXINE SUCCINATE 100 MG: 50 TABLET, EXTENDED RELEASE ORAL at 20:37

## 2020-09-01 RX ADMIN — LACTASE TAB 3000 UNIT 6000 UNITS: 3000 TAB at 17:34

## 2020-09-01 RX ADMIN — ZIPRASIDONE HCL 60 MG: 20 CAPSULE ORAL at 09:02

## 2020-09-01 RX ADMIN — CIPROFLOXACIN AND DEXAMETHASONE 4 DROP: 3; 1 SUSPENSION/ DROPS AURICULAR (OTIC) at 20:40

## 2020-09-01 RX ADMIN — PRAZOSIN HYDROCHLORIDE 2 MG: 1 CAPSULE ORAL at 20:37

## 2020-09-01 RX ADMIN — HYDROXYZINE HYDROCHLORIDE 50 MG: 25 TABLET, FILM COATED ORAL at 17:34

## 2020-09-01 RX ADMIN — ZIPRASIDONE HCL 60 MG: 20 CAPSULE ORAL at 17:34

## 2020-09-01 RX ADMIN — CIPROFLOXACIN AND DEXAMETHASONE 4 DROP: 3; 1 SUSPENSION/ DROPS AURICULAR (OTIC) at 09:03

## 2020-09-01 RX ADMIN — Medication 1 TABLET: at 20:39

## 2020-09-01 RX ADMIN — MEMANTINE 10 MG: 10 TABLET ORAL at 09:02

## 2020-09-01 RX ADMIN — CLONAZEPAM 1 MG: 1 TABLET ORAL at 17:34

## 2020-09-01 RX ADMIN — HYDROXYZINE HYDROCHLORIDE 50 MG: 25 TABLET, FILM COATED ORAL at 09:02

## 2020-09-01 RX ADMIN — LACTASE TAB 3000 UNIT 6000 UNITS: 3000 TAB at 12:26

## 2020-09-01 RX ADMIN — CLONAZEPAM 1 MG: 1 TABLET ORAL at 12:26

## 2020-09-01 RX ADMIN — MONTELUKAST 10 MG: 10 TABLET, FILM COATED ORAL at 20:39

## 2020-09-01 RX ADMIN — HYDROXYZINE HYDROCHLORIDE 50 MG: 25 TABLET, FILM COATED ORAL at 04:34

## 2020-09-01 ASSESSMENT — ACTIVITIES OF DAILY LIVING (ADL)
DRESS: INDEPENDENT
ORAL_HYGIENE: INDEPENDENT
HYGIENE/GROOMING: INDEPENDENT

## 2020-09-01 NOTE — PROGRESS NOTES
Work Completed: Attended team. Review chart.     Discharge plan or goal: Pt will discharge home w/outpatient services on Thursday.                Barriers to discharge: Symptom/medication stabilization.

## 2020-09-01 NOTE — PROGRESS NOTES
"[facility], [place]   Psychiatric Progress Note        Interim History:   The patient's care was discussed with the treatment team during the daily team meeting and/or staff's chart notes were reviewed.  Staff report patient is visible in the milieu. Provider met with patient on the unit in person.      Psychiatric symptoms and interventions:  Patient reports she is \"not able to function\" taking Geodon in the morning. Scheduled Geodon at dinnertime and bedtime (with snack). Patient reports she will go to groups if she is not tired. Patient is denying suicidal thinking and urges to self harm. Discussed with patient discharge on Thursday. Patient feels she will be able to keep herself safe.     8/31:  1). Nutrition consult because she is purging and restricting. Nursing is reporting no evidence of purging. Patient requested regular diet with Lactaid.   2). Ordered daily bowel assessment; Patient is reporting irregular bowel movements.   3). Reported restless sleep- increased Remeron to 15 mg.   4). Encourage patient to use coping skills.    Medical: UTOX negative, HCG negative, CMP WNL. Lipid panel elevated, TSH 2.06 WNL, CBC with diff WNL  Daily bowel assessments- irregular BM     Behavioral/psychology/social:  Encouraged patient to attend therapeutic hospital programming olerated.   SIB precautions  Monitor and record intake.  No roommate  Patient has had 2 head banging incidents requiring seclusion in order to calm. Haldol and Benadryl has been ordered instead of Zyprexa which patient has refused       Medications:       ciprofloxacin-dexamethasone  4 drop Right Ear BID     desvenlafaxine  100 mg Oral Daily at 8 pm     folic acid  5 mg Oral Daily     lactase  6,000 Units Oral TID w/meals     levonorgestrel-ethinyl estradiol  1 tablet Oral Daily at 8 pm     memantine  10 mg Oral BID     mirtazapine  15 mg Oral At Bedtime     montelukast  10 mg Oral At Bedtime     prazosin  2 mg Oral At " Bedtime     ziprasidone  60 mg Oral BID w/meals          Allergies:     Allergies   Allergen Reactions     Hydrocodone-Acetaminophen Other (See Comments), Unknown and Nausea and Vomiting     Severe hallucinations.  Patient reports hallucinations   Severe hallucinations  Pt had hallucinations  Patient reports hallucinations   Severe hallucinations  Severe hallucinations.  Pt had hallucinations  Patient reports hallucinations   Patient reports hallucinations        Latex Hives     Codeine Other (See Comments), Nausea and Vomiting and Unknown     Pt reports having hallucinations.  Pt reports tolerating Tylenol            Labs:   No results found for this or any previous visit (from the past 24 hour(s)).       Psychiatric Examination:     /84   Pulse 94   Temp 98  F (36.7  C) (Oral)   Resp 16   Wt 88.7 kg (195 lb 9.6 oz)   LMP 07/22/2020 (Exact Date)   SpO2 99%   Breastfeeding No   BMI 33.57 kg/m    Weight is 195 lbs 9.6 oz  Body mass index is 33.57 kg/m .  Orthostatic Vitals       Most Recent      Sitting Orthostatic /89 09/01 0700    Sitting Orthostatic Pulse (bpm) 94 09/01 0700    Standing Orthostatic /90 09/01 0700    Standing Orthostatic Pulse (bpm) 94 09/01 0700        Appearance: awake, alert, adequately groomed and dressed in hospital scrubs  Attitude:  cooperative  Eye Contact:  good  Mood:  depressed  Affect:  intensity is blunted  Speech:  normal prosody  Psychomotor Behavior:  no evidence of tardive dyskinesia, dystonia, or tics  Throught Process:  linear  Associations:  no loose associations  Thought Content:  passive suicidal ideation present and thoughts of self-harm, which are remained the same  Insight:  good  Judgement:  intact  Oriented to:  time, person, and place  Attention Span and Concentration:  intact       Clinical Global Impressions  First:  Considering your total clinical experience with this particular patient population, how severe are the patient's symptoms at  this time?: 7 (08/23/20 1344)  Compared to the patient's condition at the START of treatment, this patient's condition is: 4 (08/23/20 1344)  Most recent:  Considering your total clinical experience with this particular patient population, how severe are the patient's symptoms at this time?: 7 (08/23/20 1344)  Compared to the patient's condition at the START of treatment, this patient's condition is: 4 (08/23/20 1344)         Precautions:     Behavioral Orders   Procedures     Code 1 - Restrict to Unit     Routine Programming     As clinically indicated     Self Injury Precaution     Recently had 13 staples removed from leg due to SIB. Recent head banging on unit.     Status 15     Every 15 minutes.     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   Stabilization with medication  Patient wants to restart Ketamine trial, reports appt in late Sept  Patient requested IRT's placement until ketamine trial.

## 2020-09-01 NOTE — PLAN OF CARE
Problem: OT General Care Plan  Goal: OT Goal 1  Description: Pt will practice using >2 coping strategies to manage stress and reduce symptoms to demonstrate increased readiness for discharge.     Pt did not attend scheduled groups today, but writer did provide pt many different mazes which she appeared eager to to do. In the afternoon, observed socializing closely with pt A.M. Recommending boundaries to be monitored between the two of them.

## 2020-09-01 NOTE — PLAN OF CARE
"Pt came out [place] [facility]. She socialized with peers and was medication compliant. Pt requested prn Clonazapam for anxiety at 1226. Writer checked pt bowel sounds this morning and they were hypoactive. Pt is stating that she has not had a bowel movement since she has been here to writer. Writer encouraged pt to drink water and walk the [place]. Pt states \"I dont drink water.\" Writer offered black tea but pt states \"gross, I do not drink fluids.\" Writer asked pt if she had a bowel movement since she has been here and she stated \"yes, but they were very small.\" Writer checked in with pt this afternoon and asked if she was suicidal and pt stated \"yes, but no plan.\" Pt also denies SIB, AH, VH, and HI. Will continue to monitor.    "

## 2020-09-01 NOTE — PROGRESS NOTES
"CLINICAL NUTRITION SERVICES - ASSESSMENT NOTE     Nutrition Prescription    RECOMMENDATIONS FOR MDs/PROVIDERS TO ORDER:  If there is concern for pt purging after meals recommend the following:  -remove trash can from pts room  -keep pt in common space for 1.5 hours after eating  -Remove bathroom door if able   Pt also stated distractions/activities after meals would be beneficial     Malnutrition Status:    Unable to determine due to no NFPA completed     Recommendations already ordered by Registered Dietitian (RD):  Diet education  Boost PRN/per pt request    Future/Additional Recommendations:  Monitor intakes and wt  Adjust supplements/add snacks per pt preferences    Unable to obtain in-person nutrition history or nutrition focused physical assessment (NFPA) from patient to limit exposure and to minimize use of PPE during COVID-19 pandemic. Spoke to pt over the phone    REASON FOR ASSESSMENT  Yoana Webber is a 20 year old female assessed by the dietitian for Patient/Family Request-\"Patient requested to meet with dietician. She reporting she has been purging and restricting. . No evidence of purging per nursing.\"  PMH significant for depression, anxiety, OCD, and PTSD admitted for SI.   NUTRITION HISTORY  Pt reports variable appetite at home but usually eats 2 meals per day. Stated UBW is ~195# and endorsed no recent wt changes. Noted with 23# (13.4%) wt gain in 6 months and 29# (17.5%) gain in 3 months. Reports h/o purging and restricting.    CURRENT NUTRITION ORDERS  Diet: Regular  Intake/Tolerance: Pt stated appetite is \"all over the place\". Stated she is eating msot meals but then purging afterwards. She also reports not eating some days. Per documentation intakes  Appear good and it is unclear if tp is actually purging:  No episodes of emesis have been witnessed on the unit and pt has been eating most of her meals. Pt also states she still has not had a bowel movement and states she has not urinated " "today, but it is unclear whether pt is a reliable historian. Pt had also stated she has not been drinking any fluids, but she has been observed with fluids throughout the shift  If there is concern for purging recommend keeping pt in common areas following meals. Pt believes it would be beneficial to have distractions/activities following meals so she doesn't think about purging. Reports no BM since admit. Pt requesting boost as a replacement when she doesn't feel like eating meals.   LABS  Labs reviewed:  Results for VANESSA WILL (MRN [id number]) as of 9/1/2020 09:39   Ref. Range 8/24/2020 07:43   Sodium Latest Ref Range: 133 - 144 mmol/L 138   Potassium Latest Ref Range: 3.4 - 5.3 mmol/L 4.0   Chloride Latest Ref Range: 94 - 109 mmol/L 106   Carbon Dioxide Latest Ref Range: 20 - 32 mmol/L 27   Urea Nitrogen Latest Ref Range: 7 - 30 mg/dL 13   Creatinine Latest Ref Range: 0.52 - 1.04 mg/dL 0.96   GFR Estimate Latest Ref Range: >60 mL/min/1.73_m2 84   GFR Estimate If Black Latest Ref Range: >60 mL/min/1.73_m2 >90   Calcium Latest Ref Range: 8.5 - 10.1 mg/dL 8.8   Anion Gap Latest Ref Range: 3 - 14 mmol/L 5   Albumin Latest Ref Range: 3.4 - 5.0 g/dL 3.5   Protein Total Latest Ref Range: 6.8 - 8.8 g/dL 7.6   Bilirubin Total Latest Ref Range: 0.2 - 1.3 mg/dL 0.3   Alkaline Phosphatase Latest Ref Range: 40 - 150 U/L 63   ALT Latest Ref Range: 0 - 50 U/L 12   AST Latest Ref Range: 0 - 45 U/L 8   Cholesterol Latest Ref Range: <200 mg/dL 208 (H)     MEDICATIONS  Medications reviewed:  Folic acid  lactaid  remeron  PRN: maalox, dulcolax, colace, MOM     ANTHROPOMETRICS  Height: 162.6 cm (5'4\")  Most Recent Weight: 88.7 kg (195 lb 9.6 oz)    IBW: 54.5 kg  BMI: 33.57 kg/m^2, Obesity Grade I BMI 30-34.9  Weight History: 23# (13.4%) wt gain in 6 months. 29# (17.5%) gain in 3 months   Wt Readings from Last 10 Encounters:   08/29/20 88.7 kg (195 lb 9.6 oz)   05/12/20 75.6 kg (166 lb 11.2 oz)   04/21/20 76.2 kg (168 lb) "   04/02/20 76.1 kg (167 lb 11.2 oz)   03/24/20 77.1 kg (169 lb 15.6 oz)   03/16/20 76.8 kg (169 lb 6.4 oz)   03/10/20 77.1 kg (170 lb)   03/02/20 78.3 kg (172 lb 11.2 oz)   02/24/20 78.3 kg (172 lb 9.6 oz)   02/17/20 79.8 kg (175 lb 14.8 oz)     Dosing Weight: 63 kg (adjsuted using current wt of 88.7 kg and ideal wt of 54.5 kg)    ASSESSED NUTRITION NEEDS  Estimated Energy Needs: 1370-4030 kcals/day (25 - 30 kcals/kg)  Justification: Maintenance  Estimated Protein Needs: 63-76 grams protein/day (1 - 1.2 grams of pro/kg)  Justification: Obesity guidelines  Estimated Fluid Needs: 4530-0995 mL/day (1 mL/kcal)   Justification: Maintenance or Per provider pending fluid status    PHYSICAL FINDINGS  See malnutrition section below.     MALNUTRITION  % Intake: Decreased intake does not meet criteria  % Weight Loss: None noted  Subcutaneous Fat Loss: Unable to assess  Muscle Loss: Unable to assess  Fluid Accumulation/Edema: None noted  Malnutrition Diagnosis: Unable to determine due to no NFPA completed     NUTRITION DIAGNOSIS  Predicted inadequate nutrient intake related to ED symptoms as evidenced by pt report       INTERVENTIONS  Implementation  Nutrition Education: Discussed role of RD, menu ordering and available snacks/supplements. Encouraged intakes and discussed ways to keep busy/distracted following meals and snacks.    Boost PRN (per pt request)     Goals  Patient to consume % of nutritionally adequate meal trays TID, or the equivalent with supplements/snacks.     Monitoring/Evaluation  Progress toward goals will be monitored and evaluated per protocol.    Dedra Xiao MS, RD, LDN  Unit Pager [phone]

## 2020-09-01 NOTE — PROGRESS NOTES
"   08/31/20 2100   Behavioral Health   Hallucinations denies / not responding to hallucinations   Thinking intact   Orientation person: oriented;place: oriented;date: oriented;time: oriented   Memory baseline memory;other (see comment)  (reported short term memory lapses)   Insight insight appropriate to situation;insight appropriate to events   Judgement impaired   Eye Contact at examiner   Affect blunted, flat   Mood mood is calm;anxious;other (see comments);irritable  (anx 7/10, dep 9/10)   Physical Appearance/Attire attire appropriate to age and situation   Hygiene neglected grooming - unclean body, hair, teeth   Suicidality chronic thoughts with no stated plan   1. Wish to be Dead (Recent) No   2. Non-Specific Active Suicidal Thoughts (Recent) No   Self Injury chronic thoughts with no stated plan   Elopement   (no concerns)   Activity withdrawn   Speech clear;coherent   Medication Sensitivity no stated side effects;no observed side effects   Psychomotor / Gait steady;balanced   Activities of Daily Living   Hygiene/Grooming independent   Oral Hygiene independent   Dress independent   Room Organization independent     Pt reported that it is \"nearly impossible\" to take medication before she experiences a flashback. Pt reported her anxiety is 7/10 and depression 9/10 with 10 being the worst. Pt mood appears flat. Pt reported SI/SIB with no plan to act. Pt denied any medication concerns. Pt was withdrawn this evening in the milieu.  "

## 2020-09-01 NOTE — PROGRESS NOTES
Patient was visible in the milieu, and reported that she feels fine. She denies self-injurious ideations, stated she has chronic suicidal thoughts, but has no plans behind those thoughts. Pt rates her depression at six, anxiety at six, and mood at four out of ten. She expresses delights about her possible discharge coming up on Thursday this week. Pt cannot wait to get reunited with her pet. Overall, she appears calm , pleasant, socially withdrawn, able to communicate her needs to staff, displays more bright affect when talking to staff, but blunted while in group with her peers, and she accepts redirections.     09/01/20 1821   Behavioral Health   Hallucinations denies / not responding to hallucinations   Thinking distractable   Orientation person: oriented;place: oriented   Memory baseline memory   Insight insight appropriate to events   Judgement   (fair)   Eye Contact at examiner   Affect blunted, flat   Mood mood is calm   Physical Appearance/Attire appears stated age;attire appropriate to age and situation   Hygiene well groomed   Suicidality chronic thoughts with no stated plan   1. Wish to be Dead (Recent) Yes   Wish to be Dead Description (Recent)   (Just thoughts, no plans behind the thoughts)   2. Non-Specific Active Suicidal Thoughts (Recent)   (As described above)   Self Injury other (see comment)  (Pt denies)   Elopement   (No concern)   Activity withdrawn   Speech clear;coherent   Medication Sensitivity no stated side effects;no observed side effects   Psychomotor / Gait balanced;steady   Coping/Psychosocial   Verbalized Emotional State acceptance;depression;anxiety;suicidal thoughts   Safety   Suicidality Status 15   Assault status 15   Elopement status 15   Activities of Daily Living   Hygiene/Grooming independent   Oral Hygiene independent   Dress independent   Room Organization independent   Activity   Activity Assistance Provided independent

## 2020-09-02 VITALS
SYSTOLIC BLOOD PRESSURE: 119 MMHG | DIASTOLIC BLOOD PRESSURE: 80 MMHG | BODY MASS INDEX: 33.57 KG/M2 | TEMPERATURE: 99.3 F | HEART RATE: 97 BPM | RESPIRATION RATE: 18 BRPM | OXYGEN SATURATION: 99 % | WEIGHT: 195.6 LBS

## 2020-09-02 PROCEDURE — 99239 HOSP IP/OBS DSCHRG MGMT >30: CPT | Mod: GT | Performed by: CLINICAL NURSE SPECIALIST

## 2020-09-02 PROCEDURE — G0177 OPPS/PHP; TRAIN & EDUC SERV: HCPCS

## 2020-09-02 PROCEDURE — 25000132 ZZH RX MED GY IP 250 OP 250 PS 637: Performed by: NURSE PRACTITIONER

## 2020-09-02 PROCEDURE — 25000132 ZZH RX MED GY IP 250 OP 250 PS 637: Performed by: CLINICAL NURSE SPECIALIST

## 2020-09-02 PROCEDURE — 25000132 ZZH RX MED GY IP 250 OP 250 PS 637: Performed by: PSYCHIATRY & NEUROLOGY

## 2020-09-02 RX ORDER — FOLIC ACID 1 MG/1
5 TABLET ORAL DAILY
Qty: 250 TABLET | Refills: 0 | Status: SHIPPED | OUTPATIENT
Start: 2020-09-03 | End: 2021-06-08

## 2020-09-02 RX ORDER — MEMANTINE HYDROCHLORIDE 5 MG/1
5 TABLET ORAL 2 TIMES DAILY
Qty: 60 TABLET | Refills: 1 | Status: SHIPPED | OUTPATIENT
Start: 2020-09-02 | End: 2021-12-20

## 2020-09-02 RX ORDER — MIRTAZAPINE 15 MG/1
15 TABLET, FILM COATED ORAL AT BEDTIME
Qty: 30 TABLET | Refills: 1 | Status: ON HOLD | OUTPATIENT
Start: 2020-09-02 | End: 2021-06-11

## 2020-09-02 RX ORDER — ZIPRASIDONE HYDROCHLORIDE 60 MG/1
60 CAPSULE ORAL 2 TIMES DAILY WITH MEALS
Qty: 60 CAPSULE | Refills: 1 | Status: SHIPPED | OUTPATIENT
Start: 2020-09-02 | End: 2021-06-08

## 2020-09-02 RX ORDER — PRAZOSIN HYDROCHLORIDE 2 MG/1
2 CAPSULE ORAL AT BEDTIME
Qty: 30 CAPSULE | Refills: 1 | Status: SHIPPED | OUTPATIENT
Start: 2020-09-02

## 2020-09-02 RX ORDER — CLONAZEPAM 1 MG/1
1 TABLET ORAL 2 TIMES DAILY PRN
Qty: 60 TABLET | Refills: 0 | Status: SHIPPED | OUTPATIENT
Start: 2020-09-02

## 2020-09-02 RX ORDER — DESVENLAFAXINE 100 MG/1
100 TABLET, EXTENDED RELEASE ORAL AT BEDTIME
Qty: 30 TABLET | Refills: 1 | Status: SHIPPED | OUTPATIENT
Start: 2020-09-02

## 2020-09-02 RX ADMIN — HYDROXYZINE HYDROCHLORIDE 25 MG: 25 TABLET, FILM COATED ORAL at 08:45

## 2020-09-02 RX ADMIN — HALOPERIDOL 10 MG: 5 TABLET ORAL at 08:45

## 2020-09-02 RX ADMIN — CIPROFLOXACIN AND DEXAMETHASONE 4 DROP: 3; 1 SUSPENSION/ DROPS AURICULAR (OTIC) at 08:42

## 2020-09-02 RX ADMIN — HYDROXYZINE HYDROCHLORIDE 50 MG: 25 TABLET, FILM COATED ORAL at 06:03

## 2020-09-02 RX ADMIN — MEMANTINE 10 MG: 10 TABLET ORAL at 08:41

## 2020-09-02 RX ADMIN — HYDROXYZINE HYDROCHLORIDE 50 MG: 25 TABLET, FILM COATED ORAL at 15:00

## 2020-09-02 RX ADMIN — HYDROXYZINE HYDROCHLORIDE 50 MG: 25 TABLET, FILM COATED ORAL at 02:03

## 2020-09-02 RX ADMIN — CLONAZEPAM 1 MG: 1 TABLET ORAL at 08:45

## 2020-09-02 RX ADMIN — FOLIC ACID 5 MG: 1 TABLET ORAL at 08:41

## 2020-09-02 RX ADMIN — LACTASE TAB 3000 UNIT 6000 UNITS: 3000 TAB at 08:41

## 2020-09-02 NOTE — PLAN OF CARE
"Pt woke up and came out to the nursing station this morning. Her affect appears bright as she was seen smiling and laughing with peers today. Pt ate breakfast and states that she feels her medications are working effectively. Pt was medication compliant and makes her needs known. She met with the provider today and stressed to her that she was ready to be discharge. Pt feels that her medications are working effectively now. This morning pt asked if she could have a boost with every meal. Pt was told that the boost would come with all meals and snacks. Pt appears to accept this answer. Writer checked in with pt this afternoon and asked if she was feeling suicidal and pt stated \"I have chronic thoughts but no plan. I will use my skills when I feel upset.\" Writer asked if she had any thoughts of self harm and pt stated \"I have chronic thoughts but can remain safe.\" Pt denies any AH, VH, or HI. Will continue to monitor.   "

## 2020-09-02 NOTE — PROGRESS NOTES
Work Completed: Completed AVS. Spoke with pt over the phone regarding discharge follow up care. Pt reported her dad is helping her get an apt this week with her psychiatrist and she has a CM through her insurance who she has been working with to find a therapist in her area.     Discharge plan or goal: Discharge home w/outpatient services.                 Barriers to discharge: None. Pt will discharge this evening.

## 2020-09-02 NOTE — PLAN OF CARE
BEHAVIORAL TEAM DISCUSSION    Participants: 4A Provider: Debra Naegele, APRN, CNS; 4A RN's:  Baudilio Barragan RN; 4A CTC's:  Nathaly Don, ([facility]).  Progress: Improving.  Continued Stay Criteria/Rationale: Symptom Stabilization. Will discharge tomorrow  Medical/Physical: None  Precautions:    Behavioral Orders   Procedures    Code 1 - Restrict to Unit    Routine Programming     As clinically indicated    Self Injury Precaution     Recently had 13 staples removed from leg due to SIB. Recent head banging on unit.    Status 15     Every 15 minutes.    Suicide precautions     Patients on Suicide Precautions should have a Combination Diet ordered that includes a Diet selection(s) AND a Behavioral Tray selection for Safe Tray - with utensils, or Safe Tray - NO utensils       Plan: CTC will coordinate disposition and after care planning.  The following services will be provided to the patient; psychiatric assessment, medication management, therapeutic milieu, individual and group support, art therapy, and skills/OT groups.   Rationale for change in precautions or plan: No Change.

## 2020-09-02 NOTE — DISCHARGE SUMMARY
"Psychiatric Discharge Summary    Yoana Webber MRN# [id number]   Age: 20 year old YOB: 2000     Date of Admission:  8/23/2020  Date of Discharge:  9/2/2020  Admitting Physician:  Juwan Louie MD  Discharge Physician:  Debra A. Naegele, APRN CNS (Contact: [phone])         Event Leading to Hospitalization:   Ms. Yoana Yeung, date of birth 2000, is a 20-year-old woman admitted to [facility] on 08/23/20.  She was admitted to treat major depression, treatment resistant, with a suicidal plan to ingest 50,000 mg of aspirin, which she looked up as a fatal dose.  I interviewed the patient, reviewed the chart and reviewed Care Everywhere records from [facility], [facility], [facility], [facility], [facility] in [place], [facility] and [facility] in [place].      The patient has ongoing diagnoses of major depressive disorder, generalized anxiety disorder, PTSD, OCD, anorexia nervosa, by history and attention deficit disorder.  She notes that she always has an ongoing low level of suicidal ideation daily, which she rates as a \"3/10\", but flare-ups and stress will increase this as it did yesterday after an argument with her family.  She notes that she is suffering currently from \"existential dread,\" not wanting to be alive.\"  These feelings have been present since about age 12 with the not wanting to be alive constant and the excess existential dread coming and going.  She notes, \"Human beings have only been on this plan for a short period of time, so what does it matter if I live?\"     She has had, by her estimation, about 15 hospitalizations, 3 residential treatment stays, partial hospitalization, IOP, and outpatient therapy.  Past treatments involve ECT, which helped at first and left ear with memory dysfunction, Lithium, Abilify, Lamictal, Prozac, Paxil, Zoloft, Celexa, Lexapro, " "Effexor, Wellbutrin, Remeron, Viibryd, Trintellix, Fetzima, Cytomel, tramadol, Latuda, Zyprexa (weight gain), lorazepam, Synthroid, Seroquel, Strattera, Pamelor, Minipress which helped PTSD at 4 mg per day,   Ketamine which \"worked fantastic\" with 3 times a week injection but was stopped she states, \"Because I made a mistake and stopped it.\"  She has an appointment coming up to restart this.  Her current medications are Pristiq 100 mg per day, Klonopin 1 mg twice a day p.r.n., birth control pill, Namenda 5 mg per day in the hopes it would act somewhat like ketamine, trazodone 100 mg at bedtime, Geodon 20 mg 4 times a day.  QT interval in 2017 was 380.  She has not taken Depakote and is uncertain if she has taken BuSpar.      She has not had TMS.        See Admission note by Juwan Louie MD  on 8/23/2020   for additional details.          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  PTSD  Borderline personality disorder  General anxiety disorder  Eating disorder unspecified          Labs:     Results for orders placed or performed during the hospital encounter of 08/23/20   Drug abuse screen 6 urine (tox)     Status: None   Result Value Ref Range    Amphetamine Qual Urine Negative NEG^Negative    Barbiturates Qual Urine Negative NEG^Negative    Benzodiazepine Qual Urine Negative NEG^Negative    Cannabinoids Qual Urine Negative NEG^Negative    Cocaine Qual Urine Negative NEG^Negative    Ethanol Qual Urine Negative NEG^Negative    Opiates Qualitative Urine Negative NEG^Negative   HCG qualitative urine (UPT)     Status: None   Result Value Ref Range    HCG Qual Urine Negative NEG^Negative   Asymptomatic COVID-19 Virus (Coronavirus) by PCR     Status: None    Specimen: Nasopharyngeal   Result Value Ref Range    COVID-19 Virus PCR to [facility] - Source Nasopharyngeal     COVID-19 Virus PCR to [facility] - Result       Test received-See reflex to IDDL test SARS CoV2 (COVID-19) Virus RT-PCR "   SARS-CoV-2 COVID-19 Virus (Coronavirus) RT-PCR Nasopharyngeal     Status: None    Specimen: Nasopharyngeal   Result Value Ref Range    SARS-CoV-2 Virus Specimen Source Nasopharyngeal     SARS-CoV-2 PCR Result NEGATIVE     SARS-CoV-2 PCR Comment       Testing was performed using the Simplexa COVID-19 Direct Assay on the [employer] Lia[employer] Scholar MDX   instrument. Additional information about this Emergency Use Authorization (EUA) assay can   be found via the Lab Guide.     MTHFR Genotype     Status: None   Result Value Ref Range    Copath Report       Patient Name: VANESSA WILL  MR#: [id number]  Specimen #: [id number]  Collected: 8/23/2020 07:34  Received: 8/24/2020 10:48  Reported: 9/1/2020 13:04  Ordering Phy(s): MARIMAR WOODARD  Additional Phy(s): [facility] AND [facility] LEO JOYNER    For improved result formatting, select 'View Enhanced Report Format' under   Linked Documents section.  _________________________________________    TEST(S) REQUESTED:  Methylene Tetrahydrofolate Reductase Molecular Analysis    SPECIMEN DESCRIPTION:  Blood    RESULTS:    MTHFR (5,10-methylenetetrahydrofolate reductase) Gene:    MTHFR Gene C677T RESULTS: NORMAL    INTERPRETATION:  The patient does not carry the C677T mutation (thermolabile form) in the   MTHFR (5,10-methylenetetrahydrofolate  reductase) gene.    METHODOLOGY: The region of genomic DNA was isolated containing the C677T   mutation (thermolabile form) of the  MTHFR gene and amplified using the polymerase chain reaction. The   amplified products were dige sted with  restriction endonuclease Hinf I and products were analyzed by gel   electrophoresis.    COMMENTS:  If a patient is the recipient of an allogeneic bone marrow transplant,   this test must be done on a  pre-transplant sample or buccal swab.  A previous allogeneic bone marrow   transplant will interfere with test  results.  Call the [facility]([phone]) for    instructions on sample collection for these  patients.    This test was developed and its performance characteristics determined by   [facility]. It has not been cleared or approved by the FDA.   The laboratory is regulated under CLIA  as qualified to perform high-complexity testing. This test is used for   clinical purposes. It should not be  regarded as investigational or for research.    A resident/fellow in an accredited training program was involved in the   selection of testing, review of  laboratory data, and/or interpretation of this case.  I, as the stu or   physician, attest that I: (i) confirmed  appropriate testing, (ii) examined the relevant raw data for the   specimen(s); and (iii) rendered or confirmed  the interpretation(s).    Electronically Signed Out By:  Gavin Lowe M.D., PhD  [facility]    CPT Codes:  A: 11222-AIVBDRR, -AIQXVA    TESTING LAB LOCATION:  [address] [address] [phone]    COLLECTION SITE:  Client:  [facility]  Location:  [facility])     Lipid panel     Status: Abnormal   Result Value Ref Range    Cholesterol 208 (H) <200 mg/dL    Triglycerides 68 <150 mg/dL    HDL Cholesterol 66 >49 mg/dL    LDL Cholesterol Calculated 128 (H) <100 mg/dL    Non HDL Cholesterol 142 (H) <130 mg/dL   TSH with free T4 reflex and/or T3 as indicated     Status: None   Result Value Ref Range    TSH 2.06 0.40 - 4.00 mU/L   Comprehensive metabolic panel     Status: None   Result Value Ref Range    Sodium 138 133 - 144 mmol/L    Potassium 4.0 3.4 - 5.3 mmol/L    Chloride 106 94 - 109 mmol/L    Carbon Dioxide 27 20 - 32 mmol/L    Anion Gap 5 3 - 14 mmol/L    Glucose 86 70 - 99 mg/dL    Urea Nitrogen 13 7 - 30 mg/dL    Creatinine 0.96 0.52 - 1.04 mg/dL    GFR Estimate 84 >60 mL/min/[1.73_m2]    GFR Estimate If Black >90 >60  mL/min/[1.73_m2]    Calcium 8.8 8.5 - 10.1 mg/dL    Bilirubin Total 0.3 0.2 - 1.3 mg/dL    Albumin 3.5 3.4 - 5.0 g/dL    Protein Total 7.6 6.8 - 8.8 g/dL    Alkaline Phosphatase 63 40 - 150 U/L    ALT 12 0 - 50 U/L    AST 8 0 - 45 U/L   CBC with platelets differential     Status: None   Result Value Ref Range    WBC 7.3 4.0 - 11.0 10e9/L    RBC Count 4.46 3.8 - 5.2 10e12/L    Hemoglobin 13.0 11.7 - 15.7 g/dL    Hematocrit 40.6 35.0 - 47.0 %    MCV 91 78 - 100 fl    MCH 29.1 26.5 - 33.0 pg    MCHC 32.0 31.5 - 36.5 g/dL    RDW 13.4 10.0 - 15.0 %    Platelet Count 211 150 - 450 10e9/L    Diff Method Automated Method     % Neutrophils 55.2 %    % Lymphocytes 38.4 %    % Monocytes 6.0 %    % Eosinophils 0.0 %    % Basophils 0.3 %    % Immature Granulocytes 0.1 %    Nucleated RBCs 0 0 /100    Absolute Neutrophil 4.0 1.6 - 8.3 10e9/L    Absolute Lymphocytes 2.8 0.8 - 5.3 10e9/L    Absolute Monocytes 0.4 0.0 - 1.3 10e9/L    Absolute Eosinophils 0.0 0.0 - 0.7 10e9/L    Absolute Basophils 0.0 0.0 - 0.2 10e9/L    Abs Immature Granulocytes 0.0 0 - 0.4 10e9/L    Absolute Nucleated RBC 0.0    Internal Medicine Adult IP Consult for [facility] Adult on 4A: Patient to be seen: Routine within 24 hrs; Call back #: [phone]; Right ear pain last 2 days; Consultant may enter orders: Yes; Requesting provider? Hospitalist (if different from att...     Status: None ()    Ceci Enrique PA-C     8/28/2020  1:35 PM  [facility] [place]  Consult Note - Hospitalist Service     Date of Admission:  8/23/2020  Consult Requested by: Debra Naegele APRN, NIKIA  Reason for Consult: Ear pain    Assessment & Plan   Yoana Webber is a 20 year old female admitted on 8/23/2020.   She has a past medical history significant for MDD, MAILE, PTSD,   Anorexia who is admitted to  for psychiatric evaluation. IM was   consulted due to right ear pain.    Otitis Externa  Right ear pain for past 2 days. Pain with pinna  and tragus   manipulation, ear edema and lack of other symptoms concerning for   AOM, etiology likely otitis externa. However, unable to   completely assess right TM due to edema and wax.   - Ciprodex drops to right ear x 7 days  - Avoid water in ears  - If not improving in 2-3 days or worsening symptoms, please   notify IM to re-evaluate    Ceci Soto PA-C  Internal Medicine [facility] Hospitalist  [facility]  Pager: [phone]      __________________________________________________________________  ____    Chief Complaint   Right ear pain    History is obtained from the patient    History of Present Illness   Yoana Webber is a 20 year old female who is admitted to    for psychiatric care who is seen by IM for right ear pain x 2   days.    She reports it is painful with touch or if she lays on it. Denies   aural fullness, changes in hearing or drainage from the ear. No   difficulty swallowing or pain with swallowing. Denies fever or   chills. No other associated symptoms. Has had both otitis externa   and acute otitis media in the past. Feels this is more like   externa symptoms, as she typically will have changes in hearing   with AOM.     Review of Systems   The 5 point Review of Systems is negative other than noted in the   HPI or here.     Past Medical History    I have reviewed this patient's medical history and updated it   with pertinent information if needed.   Past Medical History:   Diagnosis Date     Anxiety      Depressive disorder      OCD (obsessive compulsive disorder)      PTSD (post-traumatic stress disorder)        Past Surgical History   I have reviewed this patient's surgical history and updated it   with pertinent information if needed.  Past Surgical History:   Procedure Laterality Date     CHOLECYSTECTOMY       ENT SURGERY      tonsillectomy       Social History   I have reviewed this patient's social history and updated it with   pertinent information if  needed.  Social History     Tobacco Use     Smoking status: Never Smoker     Smokeless tobacco: Never Used   Substance Use Topics     Alcohol use: Yes     Frequency: Never     Comment: drinks socially with parents on weekends     Drug use: Yes     Types: Marijuana       Family History   I have reviewed this patient's family history and updated it with   pertinent information if needed.   History reviewed. No pertinent family history.    Allergies   Allergies   Allergen Reactions     Hydrocodone-Acetaminophen Other (See Comments), Unknown and   Nausea and Vomiting     Severe hallucinations.  Patient reports hallucinations   Severe hallucinations  Pt had hallucinations  Patient reports hallucinations   Severe hallucinations  Severe hallucinations.  Pt had hallucinations  Patient reports hallucinations   Patient reports hallucinations        Latex Hives     Codeine Other (See Comments), Nausea and Vomiting and Unknown     Pt reports having hallucinations.  Pt reports tolerating   Tylenol         Physical Exam   Vital Signs: Temp: 98.1  F (36.7  C) Temp src: Oral BP: 127/85   Pulse: 126     SpO2: 96 % O2 Device: None (Room air)    Weight: 192 lbs 9.6 oz    Constitutional: awake, alert, cooperative, no apparent distress,   and appears stated age  Eyes: lids and lashes normal, sclera clear and conjunctiva normal  ENT: normocepalic, without obvious abnormality, atramatic, Right   external auditory canal/sandy has tender tragus and external   canal and has mild wax noted, tympanic membranes intact   bilaterally, difficulty visualizing right light reflex due to   edema and wax  Skin: no bruising or bleeding, normal skin color, texture, turgor   and no jaundice                  Consults:   INTERNAL MEDICINE  AND NUTRITION CONSULT  Ceci Soto PA-C    Physician Assistant - C    Medicine    Consults    Signed    Date of Service:  8/28/2020  1:25 PM    Creation Time:  8/28/2020  1:24 PM          Consult Orders     Internal Medicine Adult IP Consult for [facility] Adult on 4A: Patient to be seen: Routine within 24 hrs; Call back #: [phone]; Right ear pain last 2 days; Consultant may enter orders: Yes; Requesting provider? Hospitalist (if different from att... [[id number]] ordered by Naegele, Debra Ann, APRN CNS at 08/28/20 1130            Expand All Collapse All      []Hide copied text    []Hover for details  [facility] [place]  Consult Note - Hospitalist Service     Date of Admission:  8/23/2020  Consult Requested by: Debra Naegele APRN, CNP  Reason for Consult: Ear pain        Assessment & Plan     Yoana Webber is a 20 year old female admitted on 8/23/2020. She has a past medical history significant for MDD, MAILE, PTSD, Anorexia who is admitted to  for psychiatric evaluation. IM was consulted due to right ear pain.     Otitis Externa  Right ear pain for past 2 days. Pain with pinna and tragus manipulation, ear edema and lack of other symptoms concerning for AOM, etiology likely otitis externa. However, unable to completely assess right TM due to edema and wax.   - Ciprodex drops to right ear x 7 days  - Avoid water in ears  - If not improving in 2-3 days or worsening symptoms, please notify IM to re-evaluate     Ceci Soto PA-C  Internal Medicine [facility] Hospitalist  [facility]          ______________________________________________________________________        Chief Complaint      Right ear pain     History is obtained from the patient        History of Present Illness     Yoana Webber is a 20 year old female who is admitted to  for psychiatric care who is seen by IM for right ear pain x 2 days.     She reports it is painful with touch or if she lays on it. Denies aural fullness, changes in hearing or drainage from the ear. No difficulty swallowing or pain with swallowing. Denies fever or chills. No other associated symptoms. Has had both otitis externa  and acute otitis media in the past. Feels this is more like externa symptoms, as she typically will have changes in hearing with AOM.         Review of Systems      The 5 point Review of Systems is negative other than noted in the HPI or here.         Past Medical History       I have reviewed this patient's medical history and updated it with pertinent information if needed.        Past Medical History:   Diagnosis Date     Anxiety       Depressive disorder       OCD (obsessive compulsive disorder)       PTSD (post-traumatic stress disorder)              Past Surgical History      I have reviewed this patient's surgical history and updated it with pertinent information if needed.        Past Surgical History:   Procedure Laterality Date     CHOLECYSTECTOMY         ENT SURGERY         tonsillectomy            Social History      I have reviewed this patient's social history and updated it with pertinent information if needed.  Social History            Tobacco Use     Smoking status: Never Smoker     Smokeless tobacco: Never Used   Substance Use Topics     Alcohol use: Yes       Frequency: Never       Comment: drinks socially with parents on weekends     Drug use: Yes       Types: Marijuana            Family History      I have reviewed this patient's family history and updated it with pertinent information if needed.   History reviewed. No pertinent family history.        Allergies            Allergies   Allergen Reactions     Hydrocodone-Acetaminophen Other (See Comments), Unknown and Nausea and Vomiting       Severe hallucinations.  Patient reports hallucinations   Severe hallucinations  Pt had hallucinations  Patient reports hallucinations   Severe hallucinations  Severe hallucinations.  Pt had hallucinations  Patient reports hallucinations   Patient reports hallucinations         Latex Hives     Codeine Other (See Comments), Nausea and Vomiting and Unknown       Pt reports having hallucinations.  Pt reports  "tolerating Tylenol               Physical Exam     Vital Signs: Temp: 98.1  F (36.7  C) Temp src: Oral BP: 127/85 Pulse: 126     SpO2: 96 % O2 Device: None (Room air)    Weight: 192 lbs 9.6 oz     Constitutional: awake, alert, cooperative, no apparent distress, and appears stated age  Eyes: lids and lashes normal, sclera clear and conjunctiva normal  ENT: normocepalic, without obvious abnormality, atramatic, Right external auditory canal/sandy has tender tragus and external canal and has mild wax noted, tympanic membranes intact bilaterally, difficulty visualizing right light reflex due to edema and wax  Skin: no bruising or bleeding, normal skin color, texture, turgor and no jaundice                 Dedra Xiao RD    Registered Dietitian    Nutrition    Progress Notes    Signed    Date of Service:  9/1/2020  9:38 AM    Creation Time:  9/1/2020  9:38 AM                  []Hide copied text    []Micheline for details  CLINICAL NUTRITION SERVICES - ASSESSMENT NOTE      Nutrition Prescription     RECOMMENDATIONS FOR MDs/PROVIDERS TO ORDER:  If there is concern for pt purging after meals recommend the following:  -remove trash can from pts room  -keep pt in common space for 1.5 hours after eating  -Remove bathroom door if able   Pt also stated distractions/activities after meals would be beneficial      Malnutrition Status:    Unable to determine due to no NFPA completed      Recommendations already ordered by Registered Dietitian (RD):  Diet education  Boost PRN/per pt request     Future/Additional Recommendations:  Monitor intakes and wt  Adjust supplements/add snacks per pt preferences    Unable to obtain in-person nutrition history or nutrition focused physical assessment (NFPA) from patient to limit exposure and to minimize use of PPE during COVID-19 pandemic. Spoke to pt over the phone     REASON FOR ASSESSMENT  Yoana Webber is a 20 year old female assessed by the dietitian for Patient/Family Request-\"Patient " "requested to meet with dietician. She reporting she has been purging and restricting. . No evidence of purging per nursing.\"  PMH significant for depression, anxiety, OCD, and PTSD admitted for SI.   NUTRITION HISTORY  Pt reports variable appetite at home but usually eats 2 meals per day. Stated UBW is ~195# and endorsed no recent wt changes. Noted with 23# (13.4%) wt gain in 6 months and 29# (17.5%) gain in 3 months. Reports h/o purging and restricting.    CURRENT NUTRITION ORDERS  Diet: Regular  Intake/Tolerance: Pt stated appetite is \"all over the place\". Stated she is eating msot meals but then purging afterwards. She also reports not eating some days. Per documentation intakes  Appear good and it is unclear if tp is actually purging:  No episodes of emesis have been witnessed on the unit and pt has been eating most of her meals. Pt also states she still has not had a bowel movement and states she has not urinated today, but it is unclear whether pt is a reliable historian. Pt had also stated she has not been drinking any fluids, but she has been observed with fluids throughout the shift  If there is concern for purging recommend keeping pt in common areas following meals. Pt believes it would be beneficial to have distractions/activities following meals so she doesn't think about purging. Reports no BM since admit. Pt requesting boost as a replacement when she doesn't feel like eating meals.   LABS  Labs reviewed:  Results for VANESSA WILL (MRN [id number]) as of 9/1/2020 09:39    Ref. Range 8/24/2020 07:43   Sodium Latest Ref Range: 133 - 144 mmol/L 138   Potassium Latest Ref Range: 3.4 - 5.3 mmol/L 4.0   Chloride Latest Ref Range: 94 - 109 mmol/L 106   Carbon Dioxide Latest Ref Range: 20 - 32 mmol/L 27   Urea Nitrogen Latest Ref Range: 7 - 30 mg/dL 13   Creatinine Latest Ref Range: 0.52 - 1.04 mg/dL 0.96   GFR Estimate Latest Ref Range: >60 mL/min/1.73_m2 84   GFR Estimate If Black Latest Ref Range: >60 " "mL/min/1.73_m2 >90   Calcium Latest Ref Range: 8.5 - 10.1 mg/dL 8.8   Anion Gap Latest Ref Range: 3 - 14 mmol/L 5   Albumin Latest Ref Range: 3.4 - 5.0 g/dL 3.5   Protein Total Latest Ref Range: 6.8 - 8.8 g/dL 7.6   Bilirubin Total Latest Ref Range: 0.2 - 1.3 mg/dL 0.3   Alkaline Phosphatase Latest Ref Range: 40 - 150 U/L 63   ALT Latest Ref Range: 0 - 50 U/L 12   AST Latest Ref Range: 0 - 45 U/L 8   Cholesterol Latest Ref Range: <200 mg/dL 208 (H)      MEDICATIONS  Medications reviewed:  Folic acid  lactaid  remeron  PRN: maalox, dulcolax, colace, MOM      ANTHROPOMETRICS  Height: 162.6 cm (5'4\")  Most Recent Weight: 88.7 kg (195 lb 9.6 oz)    IBW: 54.5 kg  BMI: 33.57 kg/m^2, Obesity Grade I BMI 30-34.9  Weight History: 23# (13.4%) wt gain in 6 months. 29# (17.5%) gain in 3 months       Wt Readings from Last 10 Encounters:   08/29/20 88.7 kg (195 lb 9.6 oz)   05/12/20 75.6 kg (166 lb 11.2 oz)   04/21/20 76.2 kg (168 lb)   04/02/20 76.1 kg (167 lb 11.2 oz)   03/24/20 77.1 kg (169 lb 15.6 oz)   03/16/20 76.8 kg (169 lb 6.4 oz)   03/10/20 77.1 kg (170 lb)   03/02/20 78.3 kg (172 lb 11.2 oz)   02/24/20 78.3 kg (172 lb 9.6 oz)   02/17/20 79.8 kg (175 lb 14.8 oz)      Dosing Weight: 63 kg (adjsuted using current wt of 88.7 kg and ideal wt of 54.5 kg)     ASSESSED NUTRITION NEEDS  Estimated Energy Needs: 9930-9566 kcals/day (25 - 30 kcals/kg)  Justification: Maintenance  Estimated Protein Needs: 63-76 grams protein/day (1 - 1.2 grams of pro/kg)  Justification: Obesity guidelines  Estimated Fluid Needs: 6098-5647 mL/day (1 mL/kcal)   Justification: Maintenance or Per provider pending fluid status     PHYSICAL FINDINGS  See malnutrition section below.      MALNUTRITION  % Intake: Decreased intake does not meet criteria  % Weight Loss: None noted  Subcutaneous Fat Loss: Unable to assess  Muscle Loss: Unable to assess  Fluid Accumulation/Edema: None noted  Malnutrition Diagnosis: Unable to determine due to no NFPA completed "      NUTRITION DIAGNOSIS  Predicted inadequate nutrient intake related to ED symptoms as evidenced by pt report        INTERVENTIONS  Implementation  Nutrition Education: Discussed role of RD, menu ordering and available snacks/supplements. Encouraged intakes and discussed ways to keep busy/distracted following meals and snacks.    Boost PRN (per pt request)      Goals  Patient to consume % of nutritionally adequate meal trays TID, or the equivalent with supplements/snacks.     Monitoring/Evaluation  Progress toward goals will be monitored and evaluated per protocol.     Dedra Xiao MS, RD, LDN                   Hospital Course:   Yoana Webber was admitted to Station 4A with attending Honorio Myers MD as a voluntary patient. The patient was placed under status 15 (15 minute checks) to ensure patient safety.     Patient was continued on Pristig 100 mg to address depressive and anxiety symptoms. Geodon was increased to 60 mg BID and rescheduled to dinnertime and bedtime to address sedation during the day. Discontinued trazodone and started Remeron for sleep. Patient used Klonopin for anxiety PRN. Discussed risks, benefits and side effects of medications with patient.     Yoana Webber did participate in groups and was visible in the milieu. The patient's symptoms of suicidal ideation improved. Patient reports improved mood and denies suicidal ideation. Patient has protective factors of supportive parents and seeking out treatment. Patient wants to pursue ketamine trial in late September.     Patient no longer meets criteria for hospital level of care.     Yoana Webber was released to home. At the time of discharge Yoana Webber was determined to not be a danger to herself or others.          Discharge Medications:     Current Discharge Medication List      START taking these medications    Details   folic acid (FOLVITE) 1 MG tablet Take 5 tablets (5 mg) by mouth daily  Qty: 250 tablet,  Refills: 0    Associated Diagnoses: Severe episode of recurrent major depressive disorder, without psychotic features (H)      mirtazapine (REMERON) 15 MG tablet Take 1 tablet (15 mg) by mouth At Bedtime  Qty: 30 tablet, Refills: 1    Associated Diagnoses: Insomnia due to other mental disorder      prazosin (MINIPRESS) 2 MG capsule Take 1 capsule (2 mg) by mouth At Bedtime  Qty: 30 capsule, Refills: 1    Associated Diagnoses: Nightmares         CONTINUE these medications which have CHANGED    Details   clonazePAM (KLONOPIN) 1 MG tablet Take 1 tablet (1 mg) by mouth 2 times daily as needed for anxiety  Qty: 60 tablet, Refills: 0    Associated Diagnoses: Anxiety      desvenlafaxine (PRISTIQ) 100 MG 24 hr tablet Take 1 tablet (100 mg) by mouth At Bedtime  Qty: 30 tablet, Refills: 1    Associated Diagnoses: Severe episode of recurrent major depressive disorder, without psychotic features (H)      memantine (NAMENDA) 5 MG tablet Take 1 tablet (5 mg) by mouth 2 times daily  Qty: 60 tablet, Refills: 1    Associated Diagnoses: Memory loss      ziprasidone (GEODON) 60 MG capsule Take 1 capsule (60 mg) by mouth 2 times daily (with meals)  Qty: 60 capsule, Refills: 1    Associated Diagnoses: Severe episode of recurrent major depressive disorder, without psychotic features (H)         CONTINUE these medications which have NOT CHANGED    Details   albuterol (PROAIR HFA/PROVENTIL HFA/VENTOLIN HFA) 108 (90 Base) MCG/ACT inhaler Inhale 2 puffs into the lungs every 4 hours as needed for shortness of breath / dyspnea or wheezing    Comments: Pharmacy may dispense brand covered by insurance (Proair, or proventil or ventolin or generic albuterol inhaler)      clobetasol (TEMOVATE) 0.05 % CREA cream Apply topically 2 times daily as needed for eczema      ibuprofen (ADVIL/MOTRIN) 200 MG tablet Take 800 mg by mouth every 6 hours as needed (for neck/back pain)       LEVONORGESTREL-ETHINYL ESTRAD PO Take 1 tablet by mouth daily Less[place]       montelukast (SINGULAIR) 10 MG tablet Take 10 mg by mouth daily      ondansetron (ZOFRAN-ODT) 8 MG ODT tab Take 8 mg by mouth every 8 hours as needed          STOP taking these medications       traZODone (DESYREL) 100 MG tablet Comments:   Reason for Stopping:                    Psychiatric Examination:   Appearance:  awake, alert and adequately groomed  Attitude:  cooperative  Eye Contact:  good  Mood:  better  Affect:  appropriate and in normal range  Speech:  clear, coherent  Psychomotor Behavior:  no evidence of tardive dyskinesia, dystonia, or tics  Thought Process:  logical, linear and goal oriented  Associations:  no loose associations  Thought Content:  no evidence of suicidal ideation or homicidal ideation and thoughts of self-harm, which are decreased  Insight:  fair  Judgment:  fair  Oriented to:  time, person, and place  Attention Span and Concentration:  intact  Recent and Remote Memory:  intact  Language: Able to name objects, Able to repeat phrases and Able to read and write  Fund of Knowledge: appropriate  Muscle Strength and Tone: normal  Gait and Station: Normal         Discharge Plan:   Behavioral Discharge Planning and Instructions        Summary:  You were admitted on 8/23/2020  due to Suicidal Ideations.  You were treated by Dr. 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    Provider: Dr. Jes Rey  Address: [facility]:  [address] [place] [address]  Phone: [phone]      Ketamine Trial Appointment:  9/28/2020 with Dr.Zaid Ildefonso MD @ [facility]     Therapy:   You have reported that you  "are working with a Care Coordinator/ through your insurance to get connect with a therapist in your area.      Attend all scheduled appointments with your outpatient providers. Call at least 24 hours in advance if you need to reschedule an appointment to ensure continued access to your outpatient providers.   Major Treatments, Procedures and Findings:  You were provided with: a psychiatric assessment, assessed for medical stability, medication evaluation and/or management, group therapy, family therapy, individual therapy and CD evaluation/assessment     Symptoms to Report: feeling more aggressive, increased confusion, losing more sleep, mood getting worse or thoughts of suicide     Early warning signs can include: increased depression or anxiety sleep disturbances increased thoughts or behaviors of suicide or self-harm  increased unusual thinking, such as paranoia or hearing voices     Safety and Wellness:  Take all medicines as directed.  Make no changes unless your doctor suggests them.      Follow treatment recommendations.  Refrain from alcohol and non-prescribed drugs.  Ask your support system to help you reduce your access to items that could harm yourself or others. Items could include:  Firearms  Medicines (both prescribed and over-the-counter)  Knives and other sharp objects  Ropes and like materials  Car keys  If there is a concern for safety, call 911. If there is a concern for safety, call 911.     Resources:   Crisis Intervention: 384.426.4659 or 861-651-5417 (TTY: 697.879.8028).  Call anytime for help.  [place] [employer] [place]ness (www.mn.osiel.org): 565.152.3198 or 543-498-7458.  National Suicide Prevention Line (www.mentalhealthmn.org): 278-435-IYGG (3502)  Text 4 Life: txt \"LIFE\" to 05921 for immediate support and crisis intervention     Lifestyle Adjustment:   1. Adjust your lifestyle to get enough sleep, relaxation, exercise and good nutrition.  Continue to develop healthy " coping skills to decrease stress and promote a healthy lifestyle.  2. Abstain from all substances of abuse.  3. Take medications as prescribed.  Please work with your doctor to discuss any concerns you have with your medications or side affects you may be experiencing.  4. Follow up with appointments as scheduled.       General Medication Instructions:   1. See your medication sheet(s) for instructions.   2. Take all medicines as directed.  Make no changes unless your doctor suggests them.   3. Go to all your doctor visits.  4. Be sure to have all your required lab tests. This way, your medicines can be refilled on time.  5. Do not use any drugs not prescribed by your doctor.     The treatment team has appreciated the opportunity to work with you.     Yoana, please take care and make your recovery a daily recovery.   If you have any questions or concerns our unit number is [phone]     If you would like to obtain any specific documentation regarding your hospitalization after your discharge, contact [facility] Release of Information/Medical Records:  [phone]      Attestation:  The patient has been seen and evaluated by me,  Debra A. Naegele, HANY CNS on 9/2/2020  Discharge summary time > 30 minutes    TELEMEDICINE VISIT:  The patient's condition can be safely assessed and treated via synchronous audio and audiovisual telemedicine encounter.       Start time is  1145  Stop time is 1200     REASON FOR TELEMEDICINE VISIT:  COVID-19.      ORIGINATING SITE (PATIENT LOCATION):  [facility], unit 4A.      DISTANT SITE (PROVIDER LOCATION):  Provider:  Remote setting.      CONSENT:  The patient/guardian has verbally consented to potential risks and benefits of telemedicine (video visit) versus in-person care, bill my insurance or make self-payment for services provided and responsibility for payment of noncovered services.      MODE OF COMMUNICATION:  Video conference via [employer].      As the provider, I attest  to compliance with applicable laws and regulations related to telemedicine.

## 2020-09-02 NOTE — PLAN OF CARE
"Discharge orders are received.  Reviewed and discussed discharge instructions, follow up care, future appointments and medication administration. Patient has two prescriptions, Namenda and Clonazepam that is too early to refill.  Patient reports chronic passive thoughts of SI, \"Sometimes, I wish that I will not wake up in the morning.\" denies plan or intent of SI, SIB or hallucinations.  Verbalized understanding of discharge instructions. Received personal belongings.  All forms are signed.  Patient to discharge to home. Patients transportation arrived to transport the patient to home.     "

## 2020-09-02 NOTE — PROGRESS NOTES
09/01/20 2200   Therapeutic Recreation   Type of Intervention structured groups   Activity game   Response Participates, initiates socially appropriate   Hours 0.5     Pt attended the structured Therapeutic Recreation group, participating in a group activity. Pt participated in group discussion, leisure participation, and social engagement to gain self-esteem, manage behaviors, improve social skills, decrease isolation, and reduce anxiety/depression.   Pt participated for approximately half of the group, leaving for awhile in the beginning of group.  Pt contributed to the clues given to peers during their turns. Pt was trying to encourage others to follow the rules in the game more. Pt displayed a flat, blunted affect throughout group.

## 2020-09-09 ENCOUNTER — HOSPITAL ENCOUNTER (INPATIENT)
Facility: CLINIC | Age: 20
LOS: 2 days | Discharge: HOME | DRG: 883 | End: 2020-09-11
Attending: EMERGENCY MEDICINE | Admitting: PSYCHIATRY & NEUROLOGY
Payer: COMMERCIAL

## 2020-09-09 DIAGNOSIS — Z72.89 DELIBERATE SELF-CUTTING: ICD-10-CM

## 2020-09-09 DIAGNOSIS — S61.512A LACERATION OF LEFT WRIST: ICD-10-CM

## 2020-09-09 DIAGNOSIS — Z03.818 ENCNTR FOR OBS FOR SUSP EXPSR TO OTH BIOLG AGENTS RULED OUT: ICD-10-CM

## 2020-09-09 DIAGNOSIS — R45.851 SUICIDAL IDEATION: ICD-10-CM

## 2020-09-09 PROBLEM — F60.3 BORDERLINE PERSONALITY DISORDER (H): Chronic | Status: ACTIVE | Noted: 2020-09-09

## 2020-09-09 PROCEDURE — 80320 DRUG SCREEN QUANTALCOHOLS: CPT | Performed by: EMERGENCY MEDICINE

## 2020-09-09 PROCEDURE — 99285 EMERGENCY DEPT VISIT HI MDM: CPT | Mod: 25

## 2020-09-09 PROCEDURE — 90715 TDAP VACCINE 7 YRS/> IM: CPT | Performed by: EMERGENCY MEDICINE

## 2020-09-09 PROCEDURE — U0003 INFECTIOUS AGENT DETECTION BY NUCLEIC ACID (DNA OR RNA); SEVERE ACUTE RESPIRATORY SYNDROME CORONAVIRUS 2 (SARS-COV-2) (CORONAVIRUS DISEASE [COVID-19]), AMPLIFIED PROBE TECHNIQUE, MAKING USE OF HIGH THROUGHPUT TECHNOLOGIES AS DESCRIBED BY CMS-2020-01-R: HCPCS | Performed by: EMERGENCY MEDICINE

## 2020-09-09 PROCEDURE — C9803 HOPD COVID-19 SPEC COLLECT: HCPCS

## 2020-09-09 PROCEDURE — 25000132 ZZH RX MED GY IP 250 OP 250 PS 637: Performed by: PSYCHIATRY & NEUROLOGY

## 2020-09-09 PROCEDURE — 25000128 H RX IP 250 OP 636: Performed by: EMERGENCY MEDICINE

## 2020-09-09 PROCEDURE — 12002 RPR S/N/AX/GEN/TRNK2.6-7.5CM: CPT | Mod: Z6 | Performed by: EMERGENCY MEDICINE

## 2020-09-09 PROCEDURE — 25000132 ZZH RX MED GY IP 250 OP 250 PS 637: Performed by: EMERGENCY MEDICINE

## 2020-09-09 PROCEDURE — 12002 RPR S/N/AX/GEN/TRNK2.6-7.5CM: CPT

## 2020-09-09 PROCEDURE — 99285 EMERGENCY DEPT VISIT HI MDM: CPT | Mod: 25 | Performed by: EMERGENCY MEDICINE

## 2020-09-09 PROCEDURE — 12400001 ZZH R&B MH UMMC

## 2020-09-09 PROCEDURE — 81025 URINE PREGNANCY TEST: CPT | Performed by: EMERGENCY MEDICINE

## 2020-09-09 PROCEDURE — 90471 IMMUNIZATION ADMIN: CPT

## 2020-09-09 PROCEDURE — 0HQEXZZ REPAIR LEFT LOWER ARM SKIN, EXTERNAL APPROACH: ICD-10-PCS | Performed by: EMERGENCY MEDICINE

## 2020-09-09 PROCEDURE — 80307 DRUG TEST PRSMV CHEM ANLYZR: CPT | Performed by: EMERGENCY MEDICINE

## 2020-09-09 PROCEDURE — 90791 PSYCH DIAGNOSTIC EVALUATION: CPT

## 2020-09-09 RX ORDER — MEMANTINE HYDROCHLORIDE 5 MG/1
5 TABLET ORAL 2 TIMES DAILY
Status: DISCONTINUED | OUTPATIENT
Start: 2020-09-09 | End: 2020-09-09

## 2020-09-09 RX ORDER — IBUPROFEN 800 MG/1
800 TABLET, FILM COATED ORAL EVERY 6 HOURS PRN
Status: DISCONTINUED | OUTPATIENT
Start: 2020-09-09 | End: 2020-09-11 | Stop reason: HOSPADM

## 2020-09-09 RX ORDER — OLANZAPINE 10 MG/2ML
10 INJECTION, POWDER, FOR SOLUTION INTRAMUSCULAR
Status: DISCONTINUED | OUTPATIENT
Start: 2020-09-09 | End: 2020-09-11 | Stop reason: HOSPADM

## 2020-09-09 RX ORDER — PRAZOSIN HYDROCHLORIDE 2 MG/1
2 CAPSULE ORAL AT BEDTIME
Status: DISCONTINUED | OUTPATIENT
Start: 2020-09-09 | End: 2020-09-11 | Stop reason: HOSPADM

## 2020-09-09 RX ORDER — ACETAMINOPHEN 325 MG/1
650 TABLET ORAL EVERY 4 HOURS PRN
Status: DISCONTINUED | OUTPATIENT
Start: 2020-09-09 | End: 2020-09-09

## 2020-09-09 RX ORDER — ONDANSETRON 8 MG/1
8 TABLET, ORALLY DISINTEGRATING ORAL EVERY 8 HOURS PRN
Status: DISCONTINUED | OUTPATIENT
Start: 2020-09-09 | End: 2020-09-11 | Stop reason: HOSPADM

## 2020-09-09 RX ORDER — LIDOCAINE HYDROCHLORIDE AND EPINEPHRINE 10; 10 MG/ML; UG/ML
INJECTION, SOLUTION INFILTRATION; PERINEURAL
Status: DISCONTINUED
Start: 2020-09-09 | End: 2020-09-09 | Stop reason: HOSPADM

## 2020-09-09 RX ORDER — DESVENLAFAXINE 50 MG/1
100 TABLET, FILM COATED, EXTENDED RELEASE ORAL AT BEDTIME
Status: DISCONTINUED | OUTPATIENT
Start: 2020-09-09 | End: 2020-09-11 | Stop reason: HOSPADM

## 2020-09-09 RX ORDER — FOLIC ACID 1 MG/1
5 TABLET ORAL DAILY
Status: DISCONTINUED | OUTPATIENT
Start: 2020-09-09 | End: 2020-09-10

## 2020-09-09 RX ORDER — ALUMINA, MAGNESIA, AND SIMETHICONE 2400; 2400; 240 MG/30ML; MG/30ML; MG/30ML
30 SUSPENSION ORAL EVERY 4 HOURS PRN
Status: DISCONTINUED | OUTPATIENT
Start: 2020-09-09 | End: 2020-09-11 | Stop reason: HOSPADM

## 2020-09-09 RX ORDER — POLYETHYLENE GLYCOL 3350 17 G/17G
1 POWDER, FOR SOLUTION ORAL 2 TIMES DAILY PRN
COMMUNITY

## 2020-09-09 RX ORDER — TRAZODONE HYDROCHLORIDE 100 MG/1
100 TABLET ORAL AT BEDTIME
Status: DISCONTINUED | OUTPATIENT
Start: 2020-09-09 | End: 2020-09-10

## 2020-09-09 RX ORDER — HYDROXYZINE HYDROCHLORIDE 25 MG/1
25-50 TABLET, FILM COATED ORAL EVERY 4 HOURS PRN
Status: DISCONTINUED | OUTPATIENT
Start: 2020-09-09 | End: 2020-09-11 | Stop reason: HOSPADM

## 2020-09-09 RX ORDER — POLYETHYLENE GLYCOL 3350 17 G/17G
17 POWDER, FOR SOLUTION ORAL 2 TIMES DAILY
Status: DISCONTINUED | OUTPATIENT
Start: 2020-09-09 | End: 2020-09-11 | Stop reason: HOSPADM

## 2020-09-09 RX ORDER — TRAZODONE HYDROCHLORIDE 100 MG/1
75 TABLET ORAL AT BEDTIME
COMMUNITY

## 2020-09-09 RX ORDER — MONTELUKAST SODIUM 10 MG/1
10 TABLET ORAL DAILY
Status: DISCONTINUED | OUTPATIENT
Start: 2020-09-09 | End: 2020-09-11 | Stop reason: HOSPADM

## 2020-09-09 RX ORDER — FOLIC ACID 1 MG/1
1 TABLET ORAL ONCE
Status: COMPLETED | OUTPATIENT
Start: 2020-09-09 | End: 2020-09-09

## 2020-09-09 RX ORDER — MEMANTINE HYDROCHLORIDE 5 MG/1
5 TABLET ORAL 2 TIMES DAILY
Status: DISCONTINUED | OUTPATIENT
Start: 2020-09-09 | End: 2020-09-11 | Stop reason: HOSPADM

## 2020-09-09 RX ORDER — BISACODYL 10 MG
10 SUPPOSITORY, RECTAL RECTAL DAILY PRN
Status: DISCONTINUED | OUTPATIENT
Start: 2020-09-09 | End: 2020-09-11 | Stop reason: HOSPADM

## 2020-09-09 RX ORDER — ALBUTEROL SULFATE 90 UG/1
2 AEROSOL, METERED RESPIRATORY (INHALATION) EVERY 4 HOURS PRN
Status: DISCONTINUED | OUTPATIENT
Start: 2020-09-09 | End: 2020-09-11 | Stop reason: HOSPADM

## 2020-09-09 RX ORDER — OLANZAPINE 10 MG/1
10 TABLET ORAL
Status: DISCONTINUED | OUTPATIENT
Start: 2020-09-09 | End: 2020-09-11 | Stop reason: HOSPADM

## 2020-09-09 RX ORDER — CLONAZEPAM 1 MG/1
1 TABLET ORAL 2 TIMES DAILY PRN
Status: DISCONTINUED | OUTPATIENT
Start: 2020-09-09 | End: 2020-09-11 | Stop reason: HOSPADM

## 2020-09-09 RX ORDER — LEVONORGESTREL/ETHIN.ESTRADIOL 0.1-0.02MG
1 TABLET ORAL DAILY
COMMUNITY
End: 2021-06-08

## 2020-09-09 RX ORDER — FOLIC ACID 1 MG/1
4 TABLET ORAL ONCE
Status: COMPLETED | OUTPATIENT
Start: 2020-09-09 | End: 2020-09-09

## 2020-09-09 RX ORDER — LEVONORGESTREL/ETHIN.ESTRADIOL 0.1-0.02MG
1 TABLET ORAL DAILY
Status: DISCONTINUED | OUTPATIENT
Start: 2020-09-09 | End: 2020-09-11 | Stop reason: HOSPADM

## 2020-09-09 RX ADMIN — POLYETHYLENE GLYCOL 3350 17 G: 17 POWDER, FOR SOLUTION ORAL at 21:37

## 2020-09-09 RX ADMIN — MEMANTINE 5 MG: 5 TABLET ORAL at 21:37

## 2020-09-09 RX ADMIN — ZIPRASIDONE HCL 60 MG: 60 CAPSULE ORAL at 08:15

## 2020-09-09 RX ADMIN — MEMANTINE 5 MG: 5 TABLET ORAL at 08:16

## 2020-09-09 RX ADMIN — FOLIC ACID 1 MG: 1 TABLET ORAL at 08:15

## 2020-09-09 RX ADMIN — DESVENLAFAXINE SUCCINATE 100 MG: 50 TABLET, EXTENDED RELEASE ORAL at 21:37

## 2020-09-09 RX ADMIN — ZIPRASIDONE HCL 60 MG: 60 CAPSULE ORAL at 18:00

## 2020-09-09 RX ADMIN — FOLIC ACID 4 MG: 1 TABLET ORAL at 08:20

## 2020-09-09 RX ADMIN — TRAZODONE HYDROCHLORIDE 100 MG: 100 TABLET ORAL at 21:37

## 2020-09-09 RX ADMIN — CLONAZEPAM 1 MG: 1 TABLET ORAL at 21:41

## 2020-09-09 RX ADMIN — LEVONORGESTREL AND ETHINYL ESTRADIOL 1 TABLET: KIT at 23:07

## 2020-09-09 RX ADMIN — PRAZOSIN HYDROCHLORIDE 2 MG: 2 CAPSULE ORAL at 21:41

## 2020-09-09 RX ADMIN — CLOSTRIDIUM TETANI TOXOID ANTIGEN (FORMALDEHYDE INACTIVATED), CORYNEBACTERIUM DIPHTHERIAE TOXOID ANTIGEN (FORMALDEHYDE INACTIVATED), BORDETELLA PERTUSSIS TOXOID ANTIGEN (GLUTARALDEHYDE INACTIVATED), BORDETELLA PERTUSSIS FILAMENTOUS HEMAGGLUTININ ANTIGEN (FORMALDEHYDE INACTIVATED), BORDETELLA PERTUSSIS PERTACTIN ANTIGEN, AND BORDETELLA PERTUSSIS FIMBRIAE 2/3 ANTIGEN 0.5 ML: 5; 2; 2.5; 5; 3; 5 INJECTION, SUSPENSION INTRAMUSCULAR at 04:46

## 2020-09-09 ASSESSMENT — ACTIVITIES OF DAILY LIVING (ADL)
BATHING: 0-->INDEPENDENT
DRESS: INDEPENDENT
COGNITION: 0 - NO COGNITION ISSUES REPORTED
DRESS: 0-->INDEPENDENT
RETIRED_COMMUNICATION: 0-->UNDERSTANDS/COMMUNICATES WITHOUT DIFFICULTY
HYGIENE/GROOMING: INDEPENDENT
ORAL_HYGIENE: INDEPENDENT
SWALLOWING: 0-->SWALLOWS FOODS/LIQUIDS WITHOUT DIFFICULTY
TOILETING: 0-->INDEPENDENT
RETIRED_EATING: 0-->INDEPENDENT

## 2020-09-09 ASSESSMENT — ENCOUNTER SYMPTOMS
COUGH: 0
VOMITING: 0
DIARRHEA: 0
NAUSEA: 0

## 2020-09-09 NOTE — PLAN OF CARE
"The patient specific goals include:   Patient will participate in unit programming  Patient will identify triggers and positive coping skills  Patient will take medications as prescribed by physician both for mental health and medical  Patient coached to work on coping packet    The patient identified the following reasons for hospitalization:  Suicidal Ideation with plan to slit throat  Worsening depression    The patient identified the following goals for discharge:   \"Feel not so depressed\"   "

## 2020-09-09 NOTE — PLAN OF CARE
ADMIT: S: Pt is a 19 y/o female in [facility] ED for SI w/ a plan to slice her throat.     B: Pt reports SI w/ plan to cut her carotid artery. Hx of MDD, BPD, anxiety and PTSD. Pt reports worsening depression and SIB for past week. Pt did require sutures while in ED for a cut on left forearm. Pt was just here 2 weeks ago. Pt has been med complaint and sees a psychiatrist. Pt was discharged from [facility] on 8/23/20.   Covid has been collected, in process.   Pt head bangs and SIB actively but has agreed to remain safe.   Per pt, nothing has changed since the last time she was here as far as her behaviors.  Pt has settled in well.     A: Vol

## 2020-09-09 NOTE — ED NOTES
Sign out note: Yoana Webber is a 20 year old female was signed out to me by Dr. Singh  at 0700am.  Please see initial dictation for full details and history.  Patient has worsening suicidal ideation and a plan to cut her carotid artery.  She had presented with a cut on her left arm which was repaired in the ED.  She was evaluated by the [facility] during the night shift..  Plan at time of sign out is admit the patient for psychiatric evaluation..   She is currently awaiting inpatient bed availability.    Course in the ED:  I ordered her morning medications, which include folic acid, Namenda and Geodon. She was admitted upstairs at about 1145 am.   Clinical impression:  1. Deliberate self cutting 2. Suicidal ideation    This note was created in part by the use of Dragon voice recognition dictation system. Inadvertent grammatical errors and typographical errors may still exist.  MD Magy Ibrahim Alda L, MD  09/09/20 2728

## 2020-09-09 NOTE — ED NOTES
.  ED to Behavioral Floor Handoff    SITUATION  Yoana Webber is a 20 year old female who speaks English and lives in a home with family members The patient arrived in the ED by private car from home with a complaint of Suicidal (reports suicidal thoughts with a plan to end her life at the end of the week by icing her neck and slicing her carotid artery, rates intentions at 8/10, reports worsening depression, has cuts to forearm tonight from using a razor blade)  .The patient's current symptoms started/worsened recently, pt has had suicidal ideation for years and during this time the symptoms have increased.   In the ED, pt was diagnosed with   Final diagnoses:   Deliberate self-cutting   Suicidal ideation        Initial vitals were: BP: 131/89  Pulse: 100  Temp: 98.7  F (37.1  C)  Resp: 16  Weight: 93 kg (205 lb)  SpO2: 97 %   --------  Is the patient diabetic? No   If yes, last blood glucose? --     If yes, was this treated in the ED? --  --------  Is the patient inebriated (ETOH) No or Impaired on other substances? No  MSSA done? N/A  Last MSSA score: --    Were withdrawal symptoms treated? N/A  Does the patient have a seizure history? No. If yes, date of most recent seizure--  --------  Is the patient patient experiencing suicidal ideation? reports the following suicide factors: has a plan, states she was going to put an ice pack on neck and slit her throat    Homicidal ideation? denies current or recent homicidal ideation or behaviors.    Self-injurious behavior/urges? reports current or recent self injurious behavior or ideation including laceration left wrist was self injury behavior and not an attempt to commit suicide..  ------  Was pt aggressive in the ED No  Was a code called No  Is the pt now cooperative? Yes  -------  Meds given in ED:   Medications   lidocaine 1% with EPINEPHrine 1:100,000 1 %-1:[id number] injection (has no administration in time range)      Family present during ED course? Yes  Family  currently present? Yes    BACKGROUND  Does the patient have a cognitive impairment or developmental disability? No  Allergies:   Allergies   Allergen Reactions     Hydrocodone-Acetaminophen Other (See Comments), Nausea and Vomiting and Unknown     Severe hallucinations. Patient reports hallucinations        Latex Hives     Codeine Other (See Comments), Nausea and Vomiting and Unknown     Pt reports having hallucinations.  Pt reports tolerating Tylenol     .   Social demographics are   Social History     Socioeconomic History     Marital status: Single     Spouse name: None     Number of children: None     Years of education: None     Highest education level: None   Occupational History     None   Social Needs     Financial resource strain: None     Food insecurity     Worry: None     Inability: None     Transportation needs     Medical: None     Non-medical: None   Tobacco Use     Smoking status: Never Smoker     Smokeless tobacco: Never Used   Substance and Sexual Activity     Alcohol use: Yes     Frequency: Never     Comment: drinks socially with parents on weekends     Drug use: Yes     Types: Marijuana     Sexual activity: Not Currently   Lifestyle     Physical activity     Days per week: None     Minutes per session: None     Stress: None   Relationships     Social connections     Talks on phone: None     Gets together: None     Attends Scientologist service: None     Active member of club or organization: None     Attends meetings of clubs or organizations: None     Relationship status: None     Intimate partner violence     Fear of current or ex partner: None     Emotionally abused: None     Physically abused: None     Forced sexual activity: None   Other Topics Concern     None   Social History Narrative     None        ASSESSMENT  Labs results   Labs Ordered and Resulted from Time of ED Arrival Up to the Time of Departure from the ED   DRUG ABUSE SCREEN 6 CHEM DEP URINE ([facility])   HCG QUALITATIVE URINE   COVID-19  VIRUS (CORONAVIRUS) BY PCR      Imaging Studies: No results found for this or any previous visit (from the past 24 hour(s)).   Most recent vital signs /89   Pulse 100   Temp 98.7  F (37.1  C) (Oral)   Resp 16   Wt 93 kg (205 lb)   LMP  (LMP Unknown)   SpO2 97%   BMI 35.19 kg/m     Abnormal labs/tests/findings requiring intervention:---   Pain control: pt had none  Nausea control: pt had none    RECOMMENDATION  Are any infection precautions needed (MRSA, VRE, etc.)? No If yes, what infection? --  ---  Does the patient have mobility issues? independently. If yes, what device does the pt use? ---  ---  Is patient on 72 hour hold or commitment? No If on 72 hour hold, have hold and rights been given to patient? No  Are admitting orders written if after 10 p.m. ?N/A   Tasks needing to be completed:---     Dionne Mcclelland RN     [phone] [facility]

## 2020-09-09 NOTE — PROGRESS NOTES
Work Completed: CTC met with pt to completed psychosocial. Pt reports no new updates since being discharged last Wednesday (9/2). Pt could not identify any stressors for the change in mental status (worsening depression, Suicidal w/plan and self-injurious behaviors. Plan of care was also completed.    Discharge plan or goal: Likely return home with services                Barriers to discharge: New patient. Symptom Stabilization.

## 2020-09-09 NOTE — PHARMACY-ADMISSION MEDICATION HISTORY
Admission Medication History Completed by Pharmacy    See [facility] Admission Navigator for allergy information, preferred outpatient pharmacy, prior to admission medications and immunization status.     Medication history sources:  Discharge Summary 9/2/2020    Changes made to PTA medication list (reason)  Added: N/A  Deleted: N/A  Changed: N/A    Additional medication history information:   - Patient not interviewed as part of this medication history in light of recent discharge. Please discuss any OTC/non-prescription medication use and last doses with the patient that may not be reflected in the list below.    Prior to Admission medications    Medication Sig Last Dose Taking? Auth Provider   clonazePAM (KLONOPIN) 1 MG tablet Take 1 tablet (1 mg) by mouth 2 times daily as needed for anxiety 9/8/2020 at Unknown time Yes Naegele, Debra Ann, APRN CNS   desvenlafaxine (PRISTIQ) 100 MG 24 hr tablet Take 1 tablet (100 mg) by mouth At Bedtime 9/8/2020 at Unknown time Yes Naegele, Debra Ann, APRN CNS   folic acid (FOLVITE) 1 MG tablet Take 5 tablets (5 mg) by mouth daily 9/8/2020 at Unknown time Yes Naegele, Debra Ann, APRN CNS   levonorgestrel-ethinyl estradiol (AVIANE) 0.1-20 MG-MCG tablet Take 1 tablet by mouth daily 9/8/2020 at Unknown time Yes Reported, Patient   memantine (NAMENDA) 5 MG tablet Take 1 tablet (5 mg) by mouth 2 times daily 9/8/2020 at Unknown time Yes Naegele, Debra Ann, APRN CNS   montelukast (SINGULAIR) 10 MG tablet Take 10 mg by mouth daily 9/8/2020 at Unknown time Yes Unknown, Entered By History   polyethylene glycol (MIRALAX) 17 GM/Dose powder Take 1 capful by mouth 2 times daily  Yes Reported, Patient   prazosin (MINIPRESS) 2 MG capsule Take 1 capsule (2 mg) by mouth At Bedtime 9/8/2020 at Unknown time Yes Naegele, Debra Ann, APRN CNS   ziprasidone (GEODON) 60 MG capsule Take 1 capsule (60 mg) by mouth 2 times daily (with meals) 9/8/2020 at Unknown time Yes Naegele, Debra Ann, APRN CNS   albuterol  (PROAIR HFA/PROVENTIL HFA/VENTOLIN HFA) 108 (90 Base) MCG/ACT inhaler Inhale 2 puffs into the lungs every 4 hours as needed for shortness of breath / dyspnea or wheezing   Unknown, Entered By History   clobetasol (TEMOVATE) 0.05 % CREA cream Apply topically 2 times daily as needed for eczema   Unknown, Entered By History   ibuprofen (ADVIL/MOTRIN) 200 MG tablet Take 800 mg by mouth every 6 hours as needed (for neck/back pain)    Unknown, Entered By History   mirtazapine (REMERON) 15 MG tablet Take 1 tablet (15 mg) by mouth At Bedtime   Naegele, Debra Ann, HANY CNS   ondansetron (ZOFRAN-ODT) 8 MG ODT tab Take 8 mg by mouth every 8 hours as needed    Reported, Patient     Date completed: 09/09/20    Medication history completed by:   Yvan Chris, Zach  [facility] Building: Ascom *[phone] or [phone]
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
## 2020-09-09 NOTE — ED PROVIDER NOTES
[facility] [place] EMERGENCY DEPARTMENT ([facility])   September 9, 2020  ED 12 1:03 AM   History     Chief Complaint   Patient presents with     Suicidal     reports suicidal thoughts with a plan to end her life at the end of the week by icing her neck and slicing her carotid artery, rates intentions at 8/10, reports worsening depression, has cuts to forearm tonight from using a razor blade     The history is provided by the patient and medical records.     Yoana Webber is a 20 year old female who presents with worsening suicidal ideation and a plan. Patient state she has had suicidal ideation ongoing for weeks, months, years. Recently she just got on a train of thought that got worse. She does have plan to kill herself by applying an ice pack to her throat and then slicing her carotid. She has attempted suicide in past using a different method. She did engage in self-injurious behavior and cut her left wrist/forearm.  No other lacerations. Has had sutures in the past, maybe 50-75 on right leg. She denies any overdose tonight. No drug or alcohol use. No illnesses, cough, nausea, vomiting, or diarrhea. She has had prior psychiatric hospitalizations in the past. Patient lives with her parents.     PAST MEDICAL HISTORY:   Past Medical History:   Diagnosis Date     Anxiety      Depressive disorder      OCD (obsessive compulsive disorder)      PTSD (post-traumatic stress disorder)        PAST SURGICAL HISTORY:   Past Surgical History:   Procedure Laterality Date     CHOLECYSTECTOMY       ENT SURGERY      tonsillectomy       Past medical history, past surgical history, medications, and allergies were reviewed with the patient. Additional pertinent items: None    FAMILY HISTORY: History reviewed. No pertinent family history.    SOCIAL HISTORY:   Social History     Tobacco Use     Smoking status: Never Smoker     Smokeless tobacco: Never Used   Substance Use Topics     Alcohol use: Yes     Frequency: Never      Comment: drinks socially with parents on weekends     Social history was reviewed with the patient. Additional pertinent items: None      Patient's Medications   New Prescriptions    No medications on file   Previous Medications    ALBUTEROL (PROAIR HFA/PROVENTIL HFA/VENTOLIN HFA) 108 (90 BASE) MCG/ACT INHALER    Inhale 2 puffs into the lungs every 4 hours as needed for shortness of breath / dyspnea or wheezing    CLOBETASOL (TEMOVATE) 0.05 % CREA CREAM    Apply topically 2 times daily as needed for eczema    CLONAZEPAM (KLONOPIN) 1 MG TABLET    Take 1 tablet (1 mg) by mouth 2 times daily as needed for anxiety    DESVENLAFAXINE (PRISTIQ) 100 MG 24 HR TABLET    Take 1 tablet (100 mg) by mouth At Bedtime    FOLIC ACID (FOLVITE) 1 MG TABLET    Take 5 tablets (5 mg) by mouth daily    IBUPROFEN (ADVIL/MOTRIN) 200 MG TABLET    Take 800 mg by mouth every 6 hours as needed (for neck/back pain)     LEVONORGESTREL-ETHINYL ESTRAD PO    Take 1 tablet by mouth daily Lessina    MEMANTINE (NAMENDA) 5 MG TABLET    Take 1 tablet (5 mg) by mouth 2 times daily    MIRTAZAPINE (REMERON) 15 MG TABLET    Take 1 tablet (15 mg) by mouth At Bedtime    MONTELUKAST (SINGULAIR) 10 MG TABLET    Take 10 mg by mouth daily    ONDANSETRON (ZOFRAN-ODT) 8 MG ODT TAB    Take 8 mg by mouth every 8 hours as needed     PRAZOSIN (MINIPRESS) 2 MG CAPSULE    Take 1 capsule (2 mg) by mouth At Bedtime    ZIPRASIDONE (GEODON) 60 MG CAPSULE    Take 1 capsule (60 mg) by mouth 2 times daily (with meals)   Modified Medications    No medications on file   Discontinued Medications    No medications on file          Allergies   Allergen Reactions     Hydrocodone-Acetaminophen Other (See Comments), Nausea and Vomiting and Unknown     Severe hallucinations. Patient reports hallucinations        Latex Hives     Codeine Other (See Comments), Nausea and Vomiting and Unknown     Pt reports having hallucinations.  Pt reports tolerating Tylenol          Review of Systems    Respiratory: Negative for cough.    Gastrointestinal: Negative for diarrhea, nausea and vomiting.   Psychiatric/Behavioral: Positive for self-injury and suicidal ideas.     A complete review of systems was performed with pertinent positives and negatives noted in the HPI, and all other systems negative.    Physical Exam   BP: 131/89  Pulse: 100  Temp: 98.7  F (37.1  C)  Resp: 16  Weight: 93 kg (205 lb)  SpO2: 97 %      Physical Exam  Physical Exam   Constitutional: oriented to person, place, and time. appears well-developed and well-nourished.   HENT:   Head: Normocephalic and atraumatic.   Neck: Normal range of motion.   Pulmonary/Chest: Effort normal. No respiratory distress.   Cardiac: No murmurs, rubs, gallops. RRR.  Abdominal: Abdomen soft, nontender, nondistended. No rebound tenderness.  MSK: There is a 5 cm laceration with exposed subcu tissue on the left anterior forearm, no bleeding, linear.  Range of motion of elbow, wrist normal.  Flexion, extension, abduction, thumb opposition intact in the left hand.  Radial pulse left hand intact.  Capillary refill and sensation intact over the left upper extremity.  Neurological: alert and oriented to person, place, and time.   Skin: Skin is warm and dry.   Psychiatric:  normal mood and affect.  behavior is normal. Thought content normal.     ED Course        [facility], [place]    -Laceration Repair    Date/Time: 9/9/2020 2:28 AM  Performed by: Chris Singh MD  Authorized by: Chris Singh MD       ANESTHESIA (see MAR for exact dosages):     Anesthesia method:  Local infiltration    Local anesthetic:  Lidocaine 1% WITH epi  LACERATION DETAILS     Location:  Shoulder/arm    Shoulder/arm location:  L lower arm    Length (cm):  5    REPAIR TYPE:     Repair type:  Simple      EXPLORATION:     Wound exploration: wound explored through full range of motion and entire depth of wound probed and visualized      Wound extent:  fascia not violated, no foreign body, no signs of injury, no nerve damage, no tendon damage, no underlying fracture and no vascular damage      Contaminated: no      TREATMENT:     Area cleansed with:  Soap and water    Amount of cleaning:  Standard    Irrigation solution:  Tap water    Irrigation method:  Tap    SKIN REPAIR     Repair method:  Sutures    Suture size:  4-0    Suture technique:  Simple interrupted    Number of sutures:  8    APPROXIMATION     Approximation:  Close    POST-PROCEDURE DETAILS     Dressing:  Antibiotic ointment and non-adherent dressing      PROCEDURE   Patient Tolerance:  Patient tolerated the procedure well with no immediate complications                                 No results found for this or any previous visit (from the past 24 hour(s)).  Medications   lidocaine 1% with EPINEPHrine 1:100,000 1 %-1:[id number] injection (has no administration in time range)             Assessments & Plan (with Medical Decision Making)   MDM  Patient is presenting with suicidal ideation with a plan.  Laceration repaired as above, sutures can come out in 7 to 10 days.  No signs of infection.  The patient will be admitted for psychiatric stabilization, she did have a behavioral  who agrees with the plan..  Tdap updated.    I have reviewed the nursing notes.    I have reviewed the findings, diagnosis, plan and need for follow up with the patient.    New Prescriptions    No medications on file       Final diagnoses:   Deliberate self-cutting   Suicidal ideation   I, Charlene Vitale, am serving as a trained medical scribe to document services personally performed by Chris Singh MD based on the provider's statements to me on September 9, 2020.  This document has been checked and approved by the attending provider.    I, Chris Singh MD, was physically present and have reviewed and verified the accuracy of this note documented by Charlene Vitale, medical scribe.         MARK SINGH MD      9/9/2020   [facility], [place], EMERGENCY DEPARTMENT     Chris Singh MD  09/09/20 0432

## 2020-09-09 NOTE — PROGRESS NOTES
09/09/20 1245   Patient Belongings   Did you bring any home meds/supplements to the hospital?  Yes   Disposition of meds  Sent to security/pharmacy per site process   Patient Belongings sent to security per site process;locker   Patient Belongings Put in Hospital Secure Location (Security or Locker, etc.) body jewelry;cash/credit card;cell phone/electronics;clothing;glasses;keys;money (see comment);medication(s);shoes   Belongings Search Yes   Clothing Search Yes   Second Staff Jolene RN, Fernanda RN       BELONGINGS STORED ON UNIT  Black phone  Bra  Blue shirt  Black yoga pants  Sandal shoes  Black purse  Sunglasses+case  Glasses+ case  Body jewelry (with pt)  Small grey container with misc items  Phone   Tic tacs  Female hygiene products  Skin ointment  Ibuprofen  Hand    Mask  Medical tape x 3  Salt container  Misc pens, receipts, other items  Loose change  [place]  Insurance cards  Misc reward/membership cards  Car keys & other keys    ITEMS SENT TO SECURITY  Checkbook  $81 cash  [place] [employer] debt card ([id number])  EBT card  7 misc gift cards    MEDICATIONS  Clonazepam  Prazosin  Ziprasidone hcl  Folic acid  Desvenlafaxine  Lessina tablets  Montelukast  Trazodone  Memantine  Ployethylene glycol    A               Admission:  I am responsible for any personal items that are not sent to the safe or pharmacy.  [facility] is not responsible for loss, theft or damage of any property in my possession.    Signature:  _________________________________ Date: _______  Time: _____                                              Staff Signature:  ____________________________ Date: ________  Time: _____      2nd Staff person, if patient is unable/unwilling to sign:    Signature: ________________________________ Date: ________  Time: _____     Discharge:  [facility] has returned all of my personal belongings:    Signature: _________________________________ Date: ________  Time: _____                                           Staff Signature:  ____________________________ Date: ________  Time: _____

## 2020-09-10 PROBLEM — F50.00 ANOREXIA NERVOSA IN REMISSION (H): Status: ACTIVE | Noted: 2020-09-10

## 2020-09-10 LAB
ALBUMIN SERPL-MCNC: 2.9 G/DL (ref 3.4–5)
ALP SERPL-CCNC: 57 U/L (ref 40–150)
ALT SERPL W P-5'-P-CCNC: 17 U/L (ref 0–50)
ANION GAP SERPL CALCULATED.3IONS-SCNC: 8 MMOL/L (ref 3–14)
AST SERPL W P-5'-P-CCNC: 12 U/L (ref 0–45)
BASOPHILS # BLD AUTO: 0 10E9/L (ref 0–0.2)
BASOPHILS NFR BLD AUTO: 0.1 %
BILIRUB SERPL-MCNC: 0.3 MG/DL (ref 0.2–1.3)
BUN SERPL-MCNC: 12 MG/DL (ref 7–30)
CALCIUM SERPL-MCNC: 8.1 MG/DL (ref 8.5–10.1)
CHLORIDE SERPL-SCNC: 105 MMOL/L (ref 94–109)
CHOLEST SERPL-MCNC: 205 MG/DL
CO2 SERPL-SCNC: 24 MMOL/L (ref 20–32)
CREAT SERPL-MCNC: 0.95 MG/DL (ref 0.52–1.04)
DIFFERENTIAL METHOD BLD: NORMAL
EOSINOPHIL # BLD AUTO: 0 10E9/L (ref 0–0.7)
EOSINOPHIL NFR BLD AUTO: 0 %
ERYTHROCYTE [DISTWIDTH] IN BLOOD BY AUTOMATED COUNT: 13.8 % (ref 10–15)
GFR SERPL CREATININE-BSD FRML MDRD: 84 ML/MIN/{1.73_M2}
GLUCOSE SERPL-MCNC: 71 MG/DL (ref 70–99)
HCT VFR BLD AUTO: 36.5 % (ref 35–47)
HDLC SERPL-MCNC: 57 MG/DL
HGB BLD-MCNC: 11.9 G/DL (ref 11.7–15.7)
IMM GRANULOCYTES # BLD: 0 10E9/L (ref 0–0.4)
IMM GRANULOCYTES NFR BLD: 0.3 %
LDLC SERPL CALC-MCNC: 128 MG/DL
LYMPHOCYTES # BLD AUTO: 2.6 10E9/L (ref 0.8–5.3)
LYMPHOCYTES NFR BLD AUTO: 35.4 %
MCH RBC QN AUTO: 28.9 PG (ref 26.5–33)
MCHC RBC AUTO-ENTMCNC: 32.6 G/DL (ref 31.5–36.5)
MCV RBC AUTO: 89 FL (ref 78–100)
MONOCYTES # BLD AUTO: 0.5 10E9/L (ref 0–1.3)
MONOCYTES NFR BLD AUTO: 6.2 %
NEUTROPHILS # BLD AUTO: 4.2 10E9/L (ref 1.6–8.3)
NEUTROPHILS NFR BLD AUTO: 58 %
NONHDLC SERPL-MCNC: 148 MG/DL
NRBC # BLD AUTO: 0 10*3/UL
NRBC BLD AUTO-RTO: 0 /100
PLATELET # BLD AUTO: 204 10E9/L (ref 150–450)
POTASSIUM SERPL-SCNC: 3.7 MMOL/L (ref 3.4–5.3)
PROT SERPL-MCNC: 6.8 G/DL (ref 6.8–8.8)
RBC # BLD AUTO: 4.12 10E12/L (ref 3.8–5.2)
SODIUM SERPL-SCNC: 137 MMOL/L (ref 133–144)
TRIGL SERPL-MCNC: 102 MG/DL
TSH SERPL DL<=0.005 MIU/L-ACNC: 0.97 MU/L (ref 0.4–4)
WBC # BLD AUTO: 7.2 10E9/L (ref 4–11)

## 2020-09-10 PROCEDURE — 12400001 ZZH R&B MH UMMC

## 2020-09-10 PROCEDURE — 80053 COMPREHEN METABOLIC PANEL: CPT | Performed by: PSYCHIATRY & NEUROLOGY

## 2020-09-10 PROCEDURE — 25000132 ZZH RX MED GY IP 250 OP 250 PS 637: Performed by: PSYCHIATRY & NEUROLOGY

## 2020-09-10 PROCEDURE — 80061 LIPID PANEL: CPT | Performed by: PSYCHIATRY & NEUROLOGY

## 2020-09-10 PROCEDURE — 84443 ASSAY THYROID STIM HORMONE: CPT | Performed by: PSYCHIATRY & NEUROLOGY

## 2020-09-10 PROCEDURE — 85025 COMPLETE CBC W/AUTO DIFF WBC: CPT | Performed by: PSYCHIATRY & NEUROLOGY

## 2020-09-10 PROCEDURE — 36415 COLL VENOUS BLD VENIPUNCTURE: CPT | Performed by: PSYCHIATRY & NEUROLOGY

## 2020-09-10 PROCEDURE — 25000132 ZZH RX MED GY IP 250 OP 250 PS 637: Performed by: EMERGENCY MEDICINE

## 2020-09-10 PROCEDURE — 99207 ZZC CDG-CODE INCORRECT PER BILLING BASED ON TIME: CPT | Performed by: PSYCHIATRY & NEUROLOGY

## 2020-09-10 PROCEDURE — 99223 1ST HOSP IP/OBS HIGH 75: CPT | Mod: GT | Performed by: PSYCHIATRY & NEUROLOGY

## 2020-09-10 RX ORDER — TRAZODONE HYDROCHLORIDE 100 MG/1
200 TABLET ORAL AT BEDTIME
Status: DISCONTINUED | OUTPATIENT
Start: 2020-09-10 | End: 2020-09-11 | Stop reason: HOSPADM

## 2020-09-10 RX ADMIN — TRAZODONE HYDROCHLORIDE 100 MG: 100 TABLET ORAL at 00:20

## 2020-09-10 RX ADMIN — POLYETHYLENE GLYCOL 3350 17 G: 17 POWDER, FOR SOLUTION ORAL at 10:43

## 2020-09-10 RX ADMIN — OLANZAPINE 10 MG: 10 TABLET, FILM COATED ORAL at 02:20

## 2020-09-10 RX ADMIN — TRAZODONE HYDROCHLORIDE 200 MG: 100 TABLET ORAL at 21:35

## 2020-09-10 RX ADMIN — OLANZAPINE 10 MG: 10 TABLET, FILM COATED ORAL at 19:13

## 2020-09-10 RX ADMIN — ZIPRASIDONE HCL 60 MG: 60 CAPSULE ORAL at 17:19

## 2020-09-10 RX ADMIN — LEVONORGESTREL AND ETHINYL ESTRADIOL 1 TABLET: KIT at 21:24

## 2020-09-10 RX ADMIN — MEMANTINE 5 MG: 5 TABLET ORAL at 10:43

## 2020-09-10 RX ADMIN — POLYETHYLENE GLYCOL 3350 17 G: 17 POWDER, FOR SOLUTION ORAL at 21:23

## 2020-09-10 RX ADMIN — MEMANTINE 5 MG: 5 TABLET ORAL at 21:24

## 2020-09-10 RX ADMIN — MONTELUKAST 10 MG: 10 TABLET, FILM COATED ORAL at 10:42

## 2020-09-10 RX ADMIN — DESVENLAFAXINE SUCCINATE 100 MG: 50 TABLET, EXTENDED RELEASE ORAL at 21:23

## 2020-09-10 RX ADMIN — CLONAZEPAM 1 MG: 1 TABLET ORAL at 17:19

## 2020-09-10 RX ADMIN — HYDROXYZINE HYDROCHLORIDE 50 MG: 25 TABLET, FILM COATED ORAL at 00:21

## 2020-09-10 RX ADMIN — PRAZOSIN HYDROCHLORIDE 2 MG: 2 CAPSULE ORAL at 21:26

## 2020-09-10 ASSESSMENT — ACTIVITIES OF DAILY LIVING (ADL)
HYGIENE/GROOMING: INDEPENDENT
ORAL_HYGIENE: INDEPENDENT
ORAL_HYGIENE: INDEPENDENT
DRESS: INDEPENDENT
DRESS: INDEPENDENT
LAUNDRY: UNABLE TO COMPLETE
HYGIENE/GROOMING: INDEPENDENT
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
## 2020-09-10 NOTE — PLAN OF CARE
BEHAVIORAL TEAM DISCUSSION    Participants: 4A Provider: Dr. Eldon Nelson MD; 4A RN's:  Fernanda Riley, RN; 4A CTC's:  Nathaly Don ([facility]).  Progress:  Continuing to Assess .  Continued Stay Criteria/Rationale: New Patient  Medical/Physical: None  Precautions:    Behavioral Orders   Procedures    Code 1 - Restrict to Unit    Routine Programming     As clinically indicated    Self Injury Precaution    Status 15     Every 15 minutes.    Suicide precautions     Patients on Suicide Precautions should have a Combination Diet ordered that includes a Diet selection(s) AND a Behavioral Tray selection for Safe Tray - with utensils, or Safe Tray - NO utensils       Plan: CTC will coordinate disposition and after care planning.  The following services will be provided to the patient; psychiatric assessment, medication management, therapeutic milieu, individual and group support, art therapy, and skills/OT groups.   Rationale for change in precautions or plan: No Change.
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
## 2020-09-10 NOTE — PLAN OF CARE
"  Problem: Depressive Symptoms  Goal: Depressive Symptoms  Description: Signs and symptoms of listed problems will be absent or manageable.  9/10/2020 0912 by Petty Riley RN  Outcome: No Change  Flowsheets (Taken 9/10/2020 0912)  Depressive Symptoms Assessed: all  Depressive Symptoms Present:    anxiety    suicidality    thought process    medication sensitivity    self injury    affect    sleep    mood  9/10/2020 0911 by Petty Riley, RN  Outcome: No Change  S: Pt has active suicidal thoughts with constant vague thoughts of wanting to be dead. Pt reports her deterrent is coming into hospital, because she cannot kill herself on the unit. Pt has thoughts to harm herself, but no urges at this time. Pt remarked several times that she does not want to live.   Pt c/o of poor sleep last night. See order for increased trazodone dosage.   Isolative to bed.   Refusing meals- does not want to live so she won't eat. Pt was observed eating lunch.   Refused am Geodon dose, because \"I will just go back to bed\".  Crying on phone about cost of treatment.    B: Pt used coping skills during last admit of weighted blanket, shoulder wrap, ice packs, coloring. Pt planned to starve herself last admit, but did eat when she was hungry. Hx of 5 pts on unit- see DEC note where she states she did it to get her anxiety changed.     A: blunted mood, sad, expressing hopelessness. Assertive and clear about her immediate needs.    Napped 4.25 hours documented.   Left wrist wound- 8 stitches, cleansed with derma wound cleanser, covered with 4x4 boarder for protection. Pt tolerated well.   Folate discontinued- no MTHFR gene abnormality- Dr Nelson approved.     R: Suicide and self injurious precautions. Monitor for attention seeking behaviors.      "
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
## 2020-09-10 NOTE — PROGRESS NOTES
Patient approaches the nursing station early in the shift with complaints of insomnia and racing thoughts. Requests repeat on her Trazodone and 50 mg Hydroxyzine given. Remains awake and at 0220 requests something else to sleep and given Zyprexa 10 mg. Will continue to monitor and will document total hours of sleep in flowsheet.

## 2020-09-11 VITALS
DIASTOLIC BLOOD PRESSURE: 86 MMHG | RESPIRATION RATE: 16 BRPM | WEIGHT: 199.3 LBS | HEART RATE: 91 BPM | SYSTOLIC BLOOD PRESSURE: 125 MMHG | OXYGEN SATURATION: 98 % | TEMPERATURE: 98.1 F | BODY MASS INDEX: 34.21 KG/M2

## 2020-09-11 PROCEDURE — 25000132 ZZH RX MED GY IP 250 OP 250 PS 637: Performed by: EMERGENCY MEDICINE

## 2020-09-11 PROCEDURE — 99238 HOSP IP/OBS DSCHRG MGMT 30/<: CPT | Performed by: PSYCHIATRY & NEUROLOGY

## 2020-09-11 PROCEDURE — 25000132 ZZH RX MED GY IP 250 OP 250 PS 637: Performed by: PSYCHIATRY & NEUROLOGY

## 2020-09-11 RX ADMIN — MONTELUKAST 10 MG: 10 TABLET, FILM COATED ORAL at 10:44

## 2020-09-11 RX ADMIN — MEMANTINE 5 MG: 5 TABLET ORAL at 10:44

## 2020-09-11 RX ADMIN — POLYETHYLENE GLYCOL 3350 17 G: 17 POWDER, FOR SOLUTION ORAL at 10:45

## 2020-09-11 NOTE — DISCHARGE SUMMARY
"Psychiatric Discharge Summary    Yoana Webber MRN# [id number]   Age: 20 year old YOB: 2000     Date of Admission:  9/9/2020  Date of Discharge:  9/11/2020  Admitting Physician:  Eldon Nelson MD  Discharge Physician:  Eldon Nelson MD         See Admission note by Eldon Nelson MD for additional details.         DIAGNOSES:  1.  Borderline personality disorder (primary diagnosis)  2.  Chronic depression secondary to #1  3.  Anorexia nervosa in remission  4.  Deliberate self-cutting  5.  Suicidal ideation  6.  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  I reviewed the available record of prior admission, briefly patient had increasing suicidal ideation with a plan to overdose on 50,000 mg of aspirin, otherwise has a history of multiple admissions, residential treatments, including for eating disorder, multiple interventions including ECT, reportedly causing \"organic brain syndrome\", and also a ketamine treatment trial.  It is unclear as to the benefit of this.  Patient has also been on a multitude of different medications, apparently without much success.     This time patient was admitted with significant cuts on her left wrist, this was sutured in the emergency room.  Patient continued to endorse suicidal ideation and requested admission to the unit.  She is here on voluntary status.     Patient reports today that she has felt depression since her teenage years, she has been in treatment, including DBT at age 11, all apparently in the wake of traumatic events, however she is quite disenfranchised with all the treatments that happened so far as she does not get any relief.  She admits feeling miserable, always depressed, " "always with suicidal ideation.  She states that \"life has come to an end\", that she is not interested in looking into any more interventions.  She states that eventually she will kill herself.  She was asked to explain why she first cut and then requests admission to the hospital, instead of seeking help in trying to avoid self injury, patient states that she really \"did not want to stop cutting, I enjoy it\".  Patient also reports that she knows \"exactly what to say and how to say to get admitted or not get admitted, as well as when to leave the hospital\".  Patient reports that she has not been able to keep jobs for any reasonable period of time, and quits them because she has depression.  She denied any difficulties with relationships, however it does not appear that she is able to describe any such meaningful relationships, specifically appears to have indicated in prior record that she was \"sex slave\" in her previous relationships, nor appears to have much motivation to keep a job or otherwise engage in meaningful activity.  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  She also states that she would \"rather die than work for a living\".          LABS:       Lab Results   Component Value Date     09/10/2020    Lab Results   Component Value Date    CHLORIDE 105 09/10/2020    Lab Results   Component Value Date    BUN 12 09/10/2020      Lab Results   Component Value Date    POTASSIUM 3.7 09/10/2020    Lab Results   Component Value Date    CO2 24 09/10/2020    Lab Results   Component Value Date    CR 0.95 09/10/2020          Lab Results   Component Value Date    WBC 7.2 09/10/2020    HGB 11.9 09/10/2020    HCT 36.5 09/10/2020    MCV 89 09/10/2020     09/10/2020     Lab Results   Component Value Date    AST 12 09/10/2020    ALT 17 09/10/2020    ALKPHOS 57 09/10/2020    BILITOTAL 0.3 09/10/2020     Lab Results   Component Value " "Date    TSH 0.97 09/10/2020       CONSULTS:  No consultations were requested during this admission    HOSPITAL COURSE:  Yoana Webber was admitted to [facility] with attending Eldon Nelson MD as a voluntary patient. The patient was placed under status 15 (15 minute checks) to ensure patient safety.     Patient  did not require seclusion or administration of emergency medications to manage behavior.    All outpatient medications were continued    Yoana Webber did participate in groups and was visible in the milieu.     Upon initial evaluation, it became evident that patient is suffering from primary borderline personality disorder.  She probably has major depressive disorder as a secondary illness, but for chronic depression is likely related to her primary diagnosis.  Patient has been agreeable to the plan we discussed, which involved DBT as a primary mode of treatment, preferably in the residential setting.  No medications were changed during this brief hospitalization.  The day following admission, patient requested to be discharged because she \"does not trust providers here\", she also stated that she disagrees with the diagnosis of borderline personality disorder because she \"always has a real reason to be upset\", and also because she is \"not manipulative.\"  She was reminded that she volunteered that she always knows \"what to say, when to say it and how to say it in order to get in or out of the hospital\".      Patient has demonstrated distorted self image, mood swings, feelings of emptiness, trouble with relationships.  She admitted to chronic suicidal ideation and multiple attempts of self-harm, some attempts are serious.  She has been exhibiting strong patterns of ineffective coping, tendency to self injure as a first response to stress, has always been resorting to self injury before seeking help. She she is quite intelligent and compensates for some of the deficiencies, but has missed on a lot of " "opportunities to learn about her illness and to engage in appropriate treatment for this.  Instead, she went the route of trying multiple interventions, including ECT, ketamine trials, multiple medications to try to get relief.    At this time patient is requested to be discharged.  This was at length discussed with the patient, alternatives to this were offered on multiple occasions by multiple staff on the unit, however patient was steadfast in her resolve.  She admitted to having passive suicidal thoughts \"in the back of my head\", however she reported \"but I am not in the mood for suicide or self harm\".  It was recognized hat this patient has chronic suicidal ideation that is unlikely to resolve and that after a brief period of stabilization, she would be discharged back home with services discussed above.      At this time there are no sufficient grounds to hold patient involuntarily.  There exists a certain known level of risk that at this point does not appear to have been significantly modified by interventions previously, and will likely remain at this level until she has completed the recommended treatments outside of this facility.  Patient needs a serious change of cognitive and behavioral patterns to decrease risk of self-harm and suicide, which can be achieved with long-term consistent behavioral intervention.  Given patient's lack of interest in offered treatment options she was asked to sign for discharge AGAINST MEDICAL ADVICE.    Yoana Webber was released to home and is being discharged AGAINST MEDICAL ADVICE.. At the time of discharge Yoana Webber was determined to not be a danger to herself or others. At the current time of discharge, the patient does not meet criteria for involuntary hospitalization. On the day of discharge, the patient reports that they do not have suicidal or homicidal ideation or would imminently hurt themselves or others. Steps taken to minimize risk include: " assessing patient s behavior and thought process daily during hospital stay, discharging patient with adequate plan for follow up for mental and physical health and discussing safety plan of returning to the hospital should the patient ever have thoughts of harming themselves or others. Therefore, based on all available evidence including the factors cited above, the patient does not appear to be at imminent risk for self-harm, and is appropriate for outpatient level of care or admission to a facility of her choice according to her wishes.      DISCHARGE MEDICATIONS:  Current Discharge Medication List      CONTINUE these medications which have NOT CHANGED    Details   clonazePAM (KLONOPIN) 1 MG tablet Take 1 tablet (1 mg) by mouth 2 times daily as needed for anxiety  Qty: 60 tablet, Refills: 0    Associated Diagnoses: Anxiety      desvenlafaxine (PRISTIQ) 100 MG 24 hr tablet Take 1 tablet (100 mg) by mouth At Bedtime  Qty: 30 tablet, Refills: 1    Associated Diagnoses: Severe episode of recurrent major depressive disorder, without psychotic features (H)      folic acid (FOLVITE) 1 MG tablet Take 5 tablets (5 mg) by mouth daily  Qty: 250 tablet, Refills: 0    Associated Diagnoses: Severe episode of recurrent major depressive disorder, without psychotic features (H)      !! levonorgestrel-ethinyl estradiol (AVIANE) 0.1-20 MG-MCG tablet Take 1 tablet by mouth daily      memantine (NAMENDA) 5 MG tablet Take 1 tablet (5 mg) by mouth 2 times daily  Qty: 60 tablet, Refills: 1    Associated Diagnoses: Memory loss      montelukast (SINGULAIR) 10 MG tablet Take 10 mg by mouth daily      polyethylene glycol (MIRALAX) 17 GM/Dose powder Take 1 capful by mouth 2 times daily      prazosin (MINIPRESS) 2 MG capsule Take 1 capsule (2 mg) by mouth At Bedtime  Qty: 30 capsule, Refills: 1    Associated Diagnoses: Nightmares      traZODone (DESYREL) 100 MG tablet Take 100 mg by mouth At Bedtime      ziprasidone (GEODON) 60 MG capsule  Take 1 capsule (60 mg) by mouth 2 times daily (with meals)  Qty: 60 capsule, Refills: 1    Associated Diagnoses: Severe episode of recurrent major depressive disorder, without psychotic features (H)      albuterol (PROAIR HFA/PROVENTIL HFA/VENTOLIN HFA) 108 (90 Base) MCG/ACT inhaler Inhale 2 puffs into the lungs every 4 hours as needed for shortness of breath / dyspnea or wheezing    Comments: Pharmacy may dispense brand covered by insurance (Proair, or proventil or ventolin or generic albuterol inhaler)      clobetasol (TEMOVATE) 0.05 % CREA cream Apply topically 2 times daily as needed for eczema      ibuprofen (ADVIL/MOTRIN) 200 MG tablet Take 800 mg by mouth every 6 hours as needed (for neck/back pain)       !! LEVONORGESTREL-ETHINYL ESTRAD PO Take 1 tablet by mouth daily Lessina      mirtazapine (REMERON) 15 MG tablet Take 1 tablet (15 mg) by mouth At Bedtime  Qty: 30 tablet, Refills: 1    Associated Diagnoses: Insomnia due to other mental disorder      ondansetron (ZOFRAN-ODT) 8 MG ODT tab Take 8 mg by mouth every 8 hours as needed        !! - Potential duplicate medications found. Please discuss with provider.          DISCHARGE MENTAL STATUS EXAM:  Appearance:  awake, alert  Attitude:  cooperative  Eye Contact:  good  Mood:  neutral  Affect:  restricted range  Speech:  clear, coherent  Psychomotor Behavior:  no evidence of tardive dyskinesia, dystonia, or tics  Thought Process:  linear  Associations:  no loose associations  Thought Content:  passive suicidal ideation present without intent to hurt herself  Insight:  partial  Judgment:  limited  Oriented to:  time, person, and place  Attention Span and Concentration:  intact  Recent and Remote Memory:  Reports problems subjectively, not contribitory to impede treatment efforts  Language: appropriate  Fund of Knowledge: consistent with education and experiences  Gait and Station: Normal    DISCHARGE FOLLOW-UP PLAN:  Continue medications as above.     Patient  indicated here desire to seek treatment at another facility because she disagrees with her diagnosis and does not trust her providers here.  Patient refused recommendations of this provider.  Patient will continue with previously scheduled follow-up and/or will get admitted to a different facility of her choice.  Patient is discharged AGAINST MEDICAL ADVICE.    Eldon Nelson MD, 9/11/2020, 1:45 PM

## 2020-09-11 NOTE — DISCHARGE INSTRUCTIONS
Behavioral Discharge Planning and Instructions      Summary:  You were admitted on 8/23/2020  due to Suicidal Ideations.  You were treated by Dr. 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    Provider: Dr. Jes Rey  Address: [facility]:  [address] [place] [address]  Phone: [phone] Fax: ***    Ketamine Trial Appointment:  9/28/2020 with Dr.Zaid Ildefonso MD @ [facility]    Therapy:   You have reported that you are working with a Care Coordinator/ through your insurance to get connect with a therapist in your area.     Attend all scheduled appointments with your outpatient providers. Call at least 24 hours in advance if you need to reschedule an appointment to ensure continued access to your outpatient providers.   Major Treatments, Procedures and Findings:  You were provided with: a psychiatric assessment, assessed for medical stability, medication evaluation and/or management, group therapy, family therapy, individual therapy and CD evaluation/assessment    Symptoms to Report: feeling more aggressive, increased confusion, losing more sleep, mood getting worse or thoughts of suicide    Early warning signs can include: increased depression or anxiety sleep disturbances increased thoughts or behaviors of suicide or self-harm  increased unusual thinking, such as paranoia or hearing voices    Safety and Wellness:  Take all medicines as directed.  Make no changes unless your doctor suggests them.      Follow treatment recommendations.  Refrain from alcohol and non-prescribed drugs.  Ask your support  "system to help you reduce your access to items that could harm yourself or others. Items could include:  Firearms  Medicines (both prescribed and over-the-counter)  Knives and other sharp objects  Ropes and like materials  Car keys  If there is a concern for safety, call 911. If there is a concern for safety, call 911.    Resources:   Crisis Intervention: 857.151.4102 or 760-138-2286 (TTY: 670.929.7820).  Call anytime for help.  National [place] on Mental Illness (www.mn.osiel.org): 741.737.3081 or 661-345-1641.  National Suicide Prevention Line (www.mentalhealthmn.org): 390-595-CBOM (7300)  Text 4 Life: txt \"LIFE\" to 42282 for immediate support and crisis intervention    Lifestyle Adjustment:   1. Adjust your lifestyle to get enough sleep, relaxation, exercise and good nutrition.  Continue to develop healthy coping skills to decrease stress and promote a healthy lifestyle.  2. Abstain from all substances of abuse.  3. Take medications as prescribed.  Please work with your doctor to discuss any concerns you have with your medications or side affects you may be experiencing.  4. Follow up with appointments as scheduled.      General Medication Instructions:   1. See your medication sheet(s) for instructions.   2. Take all medicines as directed.  Make no changes unless your doctor suggests them.   3. Go to all your doctor visits.  4. Be sure to have all your required lab tests. This way, your medicines can be refilled on time.  5. Do not use any drugs not prescribed by your doctor.    The treatment team has appreciated the opportunity to work with you.     Yoana, please take care and make your recovery a daily recovery.   If you have any questions or concerns our unit number is [phone]    If you would like to obtain any specific documentation regarding your hospitalization after your discharge, contact [facility] Release of Information/Medical Records:  [phone]          "

## 2020-09-11 NOTE — PROGRESS NOTES
"            Work Completed: [facility] called pt's dad to updated him on pt wanting to do a residential program and that the hospital would support this need. Pt's dad said he was already working on that as  residential is not covered by their insurance. Pt's dad brought up pt's dx of BPD. He said he doesn't agree with this and wants a second opinion because her outpatient doctor doesn't feel she has BPD. [facility] provided minimal psychoeducation on Borderline personality disorder.  Pt's dad said he would like to take his daughter to [facility] for a second opinion. He requested the psychiatrist treatment pt send a referral to [facility] so they could do an inpatinet transfer. [facility] asked pt's dad for more clarification on what the referral needs to say. [facility] explained that we can treatment pt here and there is no clinical neccesity for her to be transferred. He stated that the other option was for her to discharge and then the family could take her [facility] but couldn't guarantee admission. He noted he wanted to get her there safely and asked [facility] to \"please as the doctor.\" [facility] asked the treating psychiatrist about a referral for [facility]. The psychiatrist stated the same, that pt doesn't need to go to another hospital she can be treated here. He felt there was no clinical need for this transfer. [facility] called pt's dad back and let him know. [facility] told pt's dad that pt would benefit from being in the hospital a couple more days and that the psychiatrist doesn't currently feel safe discharging her therefore if she chooses to discharge it would be AMA. Pt's dad didn't want pt to discharge AMA. [facility] spoke with pts dad about the option of pt getting a psychiatric evaluation for diagnostic clarification if he feels she is being mis-diagnosed.     Discharge plan or goal: Treatment team recommendation is Intensive DBT program. Pt has refused this. Pt is open to individual therapy, medication management and residential treatment.                 Barriers to " discharge: Symptom instability.

## 2020-09-11 NOTE — PROGRESS NOTES
"2120-Patient awakened for snack and medications. Writer observes patient's wrists and Left anterior forearm appears reddened where patient had been scratching and right wrist bandage appears clean, dry and intact, writer did not bring attention to her areas of SIB behavior earlier to avoid triggering and are covered with sweatshirt, patient reports \"feeling much better\" and gets up and eats a snack and takes her medications without incident.  "

## 2020-09-11 NOTE — PROGRESS NOTES
"Pt reports that she feels horrible, and depressed. She denies hallucinations, rates her depression at ten, anxiety at seven, and mood at zero out of ten. Pt shares that she has active thoughts to harm herself with plan to scratch, hit her head on the walls, or hit her stitches to enable bleeding. Asked if she will be safe on the Unit, patient responded: \" I don't think so. I am not making any promises to be safe because life is nothing anymore\". Similarly, this patient also reported that she has thoughts to overdose if she has opportunity to be outside the hospital.   Pt  complains that she does not like the new diagnosis given to her by her provider her. She prefers diagnosis given by her old Psychiatrist.  As a result of feeling disappointed,she want to be transferred to [facility]. Pt stated that nothing is working for her here in the hospital, including medications she is presently taking. She informs this writer that she will get better treatment with old diagnosis than the new one given to her by her provider here at the hospital.    About 1815, she began to cry and complain how nothing was workings for her, stating that no one cares. She started scratching herself, and hitting her head on the walls. After several interventions and reassurances, Pt was able to calm down and took her medication.  Overall, patient appears sad, hopeless,anxious, depressed, seems to perform better when given special attention, displays flat affect, and able to be redirected.     09/10/20 2040   Behavioral Health   Hallucinations denies / not responding to hallucinations   Thinking distractable   Orientation person: oriented;place: oriented   Memory baseline memory   Insight poor   Judgement   (Poor)   Eye Contact at examiner   Affect blunted, flat   Mood anxious;irritable;depressed   Physical Appearance/Attire appears stated age   Hygiene neglected grooming - unclean body, hair, teeth   Suicidality other (see " comments)  (Thoughts and plan to act outside the hospital)   1. Wish to be Dead (Recent) Yes   Wish to be Dead Description (Recent)   (Plan to overdose)   2. Non-Specific Active Suicidal Thoughts (Recent) No   Self Injury other (see comment)  (Thoughts and plan to scratch herself, hit her stitches, head)   Elopement   (No concern)   Activity withdrawn   Speech coherent;clear   Medication Sensitivity no observed side effects;no stated side effects   Psychomotor / Gait balanced;steady   Coping/Psychosocial   Verbalized Emotional State anxiety;depression;suicidal thoughts;powerlessness;hopelessness;frustration;sadness   Safety   Suicidality Status 15   Assault status 15   Elopement status 15   Activities of Daily Living   Hygiene/Grooming independent   Oral Hygiene independent   Dress independent   Room Organization independent   Activity   Activity Assistance Provided independent

## 2020-09-11 NOTE — DISCHARGE SUMMARY
"Pt was given her belongings and signed her consents. Pt is in a bright mood as she is smiling and looks forward to seeing her dad. Pt stated that she feels she is at her baseline and that she will utilize the coping skills she learned on the unit. Writer asked pt if she felt she could contract for safety outside of her and she's states \"I will remain safe and if I have any bad thoughts I will call the numbers you showed me.\"   "

## 2020-09-11 NOTE — PROGRESS NOTES
Writer went over AVS with pt in regards to follow up appointments and resource numbers she can utilize. Pt will not be going home with any medications. Pt states that she does have her PRN medication at home in case she needs them for anxiety.

## 2020-09-11 NOTE — DISCHARGE INSTRUCTIONS
Behavioral Discharge Planning and Instructions      Summary:  You were admitted on 9/9/2020  due to Depression and Suicidal Ideations.  You were treated by Dr. Eldon Nelson MD and discharged on 09/11/2020 from [facility] to [facility] Locations:[id number]}      Principal Diagnosis:   1.  Borderline personality disorder (primary diagnosis)  2.  Chronic depression secondary to #1  3.  Anorexia nervosa in remission  4.  Deliberate self-cutting  5.  Suicidal ideation  6.  Laceration of left wrist    Health Care Follow-up Appointments:   It is the recommendation of the treatment team that you participate in an Intensive outpatient DBT program after discharge. You have declined to participate in DBT after discharge. You may benefit from the structure, supervision and therapeutic miliue that is offered at a Residential Treatment Program.    Medication Management:  Date/Time:Wednesday, September, 23rd    Provider: Dr. Jes Rey  Address: [facility]:  [address] [place] [address]  Phone: [phone] Fax:     Ketamine Trial Appointment:  9/28/2020 with Dr.Zaid Ildefonso MD @ [facility]     Therapy:   CTC provided you with referrals sent over by your CM from your insurance company.       Attend all scheduled appointments with your outpatient providers. Call at least 24 hours in advance if you need to reschedule an appointment to ensure continued access to your outpatient providers.   Major Treatments, Procedures and Findings:  You were provided with: a psychiatric assessment, assessed for medical stability, medication evaluation and/or management, group therapy and milieu management    Symptoms to Report: feeling more aggressive, increased confusion, losing more sleep, mood getting worse or thoughts of suicide    Early warning signs can include: increased depression or anxiety sleep disturbances increased thoughts or behaviors of suicide or self-harm  increased unusual thinking, such as paranoia or hearing  "voices    Safety and Wellness:  Take all medicines as directed.  Make no changes unless your doctor suggests them.      Follow treatment recommendations.  Refrain from alcohol and non-prescribed drugs.  Ask your support system to help you reduce your access to items that could harm yourself or others. If there is a concern for safety, call 911.    Resources:   Crisis Intervention: 539.573.2901 or 938-701-5705 (TTY: 489.891.2112).  Call anytime for help.  National [place] on Mental Illness (www.[place].osiel.org): 233.838.7350 or 756-294-2683.  National Suicide Prevention Line (www.mentalTermScoutmn.org): 541-184-OUOE (9360)  Text 4 Life: txt \"LIFE\" to 27253 for immediate support and crisis intervention  [facility] 24 hour Mental Health and AODA Crisis Line: 1.801.594.6542  [facility] Behavioral Health Services: 520.294.9445     Lifestyle Adjustment:   1. Adjust your lifestyle to get enough sleep, relaxation, exercise and good nutrition.  Continue to develop healthy coping skills to decrease stress and promote a healthy lifestyle.  2. Abstain from all substances of abuse.  3. Take medications as prescribed.  Please work with your doctor to discuss any concerns you have with your medications or side affects you may be experiencing.  4. Follow up with appointments as scheduled.      General Medication Instructions:   1. See your medication sheet(s) for instructions.   2. Take all medicines as directed.  Make no changes unless your doctor suggests them.   3. Go to all your doctor visits.  4. Be sure to have all your required lab tests. This way, your medicines can be refilled on time.  5. Do not use any drugs not prescribed by your doctor.    The treatment team has appreciated the opportunity to work with you.     Yoana, please take care and make your recovery a daily recovery.   If you have any questions or concerns our unit number is [phone]    If you would like to obtain any specific documentation regarding your " hospitalization after your discharge, contact [facility] Release of Information/Medical Records:  [phone]

## 2020-09-11 NOTE — PLAN OF CARE
"Pt has been calm and cooperative today. Pt took her medication but stated that geodon was the only one she would like to hold because it makes her sleepy and she wants to wake up. Pt ate breakfast and socialized with peers. Pt sat in the lounge and watch tv with peers. Pt meet with provider and discussed her discharge. Pt feels she is ready to leave here and wants to work with her doctor. After interview with pt writer asked pt if she was feeling suicidal and pt stated \"yes, but I always am. I am stable to leave now though.\" Writer asked pt if she was having thoughts of SIB and she stated \"yes, but I always do.\" Writer asked if she would act on them and she stated \"no.\" Writer denies AH, VH, and HI. Writer cleansed, dried, and applied new bandage to pt stiches on forearm. Pt belongs were done and AVS is printed.   "

## 2020-09-12 NOTE — PROGRESS NOTES
Attended 2 hours of music therapy group, with 5-6 patients present. Interventions focused on improving socialization, self-expression, and mood. Pt was generally quiet but attentive throughout groups. She did speak up at times when playing name that tune, but was minimally social outside of the game. She did participate in writing a list of songs to describe herself, and worked to guess peers' songs. Cooperative and pleasant.

## 2020-09-28 ENCOUNTER — VIRTUAL VISIT (OUTPATIENT)
Dept: PSYCHIATRY | Facility: CLINIC | Age: 20
End: 2020-09-28
Payer: COMMERCIAL

## 2020-09-28 DIAGNOSIS — F33.2 SEVERE EPISODE OF RECURRENT MAJOR DEPRESSIVE DISORDER, WITHOUT PSYCHOTIC FEATURES (H): Primary | ICD-10-CM

## 2020-09-28 RX ORDER — ARIPIPRAZOLE 5 MG/1
5 TABLET ORAL EVERY MORNING
COMMUNITY
Start: 2020-09-17 | End: 2021-12-20

## 2020-09-28 RX ORDER — ONDANSETRON 2 MG/ML
4 INJECTION INTRAMUSCULAR; INTRAVENOUS
Status: CANCELLED | OUTPATIENT
Start: 2020-09-28

## 2020-09-28 RX ORDER — ONDANSETRON 2 MG/ML
4 INJECTION INTRAMUSCULAR; INTRAVENOUS
Status: DISCONTINUED | OUTPATIENT
Start: 2020-09-28 | End: 2020-10-05 | Stop reason: HOSPADM

## 2020-09-28 RX ORDER — HEPARIN SODIUM (PORCINE) LOCK FLUSH IV SOLN 100 UNIT/ML 100 UNIT/ML
5 SOLUTION INTRAVENOUS
Status: DISCONTINUED | OUTPATIENT
Start: 2020-09-28 | End: 2020-10-05 | Stop reason: HOSPADM

## 2020-09-28 RX ORDER — HEPARIN SODIUM,PORCINE 10 UNIT/ML
5 VIAL (ML) INTRAVENOUS
Status: CANCELLED | OUTPATIENT
Start: 2020-09-28

## 2020-09-28 RX ORDER — HEPARIN SODIUM (PORCINE) LOCK FLUSH IV SOLN 100 UNIT/ML 100 UNIT/ML
5 SOLUTION INTRAVENOUS
Status: CANCELLED | OUTPATIENT
Start: 2020-09-28

## 2020-09-28 RX ORDER — HEPARIN SODIUM,PORCINE 10 UNIT/ML
5 VIAL (ML) INTRAVENOUS
Status: DISCONTINUED | OUTPATIENT
Start: 2020-09-28 | End: 2020-10-05 | Stop reason: HOSPADM

## 2020-09-28 RX ORDER — ZIPRASIDONE HYDROCHLORIDE 60 MG/1
60 CAPSULE ORAL AT BEDTIME
COMMUNITY
Start: 2019-04-25

## 2020-09-28 RX ORDER — HYDRALAZINE HYDROCHLORIDE 20 MG/ML
10 INJECTION INTRAMUSCULAR; INTRAVENOUS
Status: DISCONTINUED | OUTPATIENT
Start: 2020-09-28 | End: 2020-10-05 | Stop reason: HOSPADM

## 2020-09-28 RX ORDER — HYDRALAZINE HYDROCHLORIDE 20 MG/ML
10 INJECTION INTRAMUSCULAR; INTRAVENOUS
Status: CANCELLED | OUTPATIENT
Start: 2020-09-28

## 2020-09-28 ASSESSMENT — PATIENT HEALTH QUESTIONNAIRE - PHQ9: SUM OF ALL RESPONSES TO PHQ QUESTIONS 1-9: 23

## 2020-09-28 NOTE — PROGRESS NOTES
"Yoana Webber is a 20 year old female who is being evaluated via a billable video visit.      The patient has been notified of following:     \"This video visit will be conducted via a call between you and your physician/provider. We have found that certain health care needs can be provided without the need for an in-person physical exam.  This service lets us provide the care you need with a video conversation.  If a prescription is necessary we can send it directly to your pharmacy.  If lab work is needed we can place an order for that and you can then stop by our lab to have the test done at a later time.    Video visits are billed at different rates depending on your insurance coverage.  Please reach out to your insurance provider with any questions.    If during the course of the call the physician/provider feels a video visit is not appropriate, you will not be charged for this service.\"    Patient has given verbal consent for Video visit? Yes  How would you like to obtain your AVS? Mail a copy  If you are dropped from the video visit, the video invite should be resent to: Text to cell phone: [phone]  Will anyone else be joining your video visit? No        Video-Visit Details    Type of service:  Video Visit    Video Start Time: 2:30 pm  Video End Time: 3:51 PM    Originating Location (pt. Location): Other  car    Distant Location (provider location):  [facility]     Platform used for Video Visit: Irina Fregoso MD        "

## 2020-09-29 ENCOUNTER — TELEPHONE (OUTPATIENT)
Dept: PSYCHIATRY | Facility: CLINIC | Age: 20
End: 2020-09-29

## 2020-09-29 NOTE — TELEPHONE ENCOUNTER
----- Message from Narciso Darby MD sent at 9/28/2020  5:40 PM CDT -----  Regarding: FYI new tretament plans for ketamine  Can you please alert the infusion center that they need to prepare for new patient?    Narciso

## 2020-09-29 NOTE — PROGRESS NOTES
"  Psychiatry Clinic Progress Note                                                                   PCP- No primary care provider on file.  Specialty Providers- no  Therapist- no  Psychiatrist- yes  Other Mental Health Providers- no     Yoana Webber is a 19 year old female who prefers the name Yoana & pronouns she, her, hers.     Referred by:  Dr. Jes Rey M.D.  Referred for evaluation of:  Depression  History was provided by patient and mother who was a good historian.     Pertinent Background: Hx of chronic suicidality, SIB, and eating disorder. 4 prior suicide attempts. Has had >10 hospitalizations and 1 year of residential treatment (ended December 2019). Previously benefited from ECT but developed delirium.     Interim History                                                                                                        4, 4     The patient is a limited historian.  Since the last visit in March she has completed ketamine infusion cycle and she had return of severe symptoms and retana had two suicide attempts (aspirin overdose and self laceration with unintentional depth resulting in need for laceration repair and brief psychiatric hospitalization left AMA 9/11). She continues to use psychotropic medications managed by her primary psychiatrist Jes Rey. She  Noted a slow worsening after stopping ketamine and had weight gain until she reached her largest lifetime weight which was very distressing for her . After her suicide attempts she is noting weight loss again  But she needs encouragement to eat and father prepares and delivers meals to her in bed  Because she is katia rising form wilian most days. She continues to have suicidal thoughts \"This is pointless. What is the point of me being alive\"  He father manages distributing meds to avoid access to means . She feels her dad is her most important protective factor but she does not feel 100% confident in avoiding another attempt>S he would like to " resume ketamines. She would also like to pursue VNS.  She is struggling with cutting so intense she is needing stiches and stables about every 2 wks and is having trouble breaking this pattern. She currently does not have a therapist and is having trouble connecting with anyone on the referral list she got.    Recent Symptoms:   Depression:  suicidal ideation, self-destructive thoughts, depressed mood, anhedonia, low energy, hypersomnia, appetite changes, weight changes and feeling hopeless     Recent Substance Use:  none reported        Social/ Family History                                  [per patient report]                                 1ea,1ea   LIVING SITUATION- Living with Dad        Medical / Surgical History                                                                                                                  Patient Active Problem List   Diagnosis     Severe episode of recurrent major depressive disorder, without psychotic features (H)     Depression     Borderline personality disorder (H)     Anorexia nervosa in remission       Past Surgical History:   Procedure Laterality Date     CHOLECYSTECTOMY       ENT SURGERY      tonsillectomy        Medical Review of Systems                                                                                                    2,10   deferred    Allergy                                Hydrocodone-acetaminophen; Latex; and Codeine    Current Medications                                                                                                       Current Outpatient Medications   Medication Sig Dispense Refill     albuterol (PROAIR HFA/PROVENTIL HFA/VENTOLIN HFA) 108 (90 Base) MCG/ACT inhaler Inhale 2 puffs into the lungs every 4 hours as needed for shortness of breath / dyspnea or wheezing       ARIPiprazole (ABILIFY) 5 MG tablet Take 5 mg by mouth       desvenlafaxine (PRISTIQ) 100 MG 24 hr tablet Take 1 tablet (100 mg) by mouth At Bedtime 30 tablet  1     levonorgestrel-ethinyl estradiol (AVIANE) 0.1-20 MG-MCG tablet Take 1 tablet by mouth daily       memantine (NAMENDA) 5 MG tablet Take 1 tablet (5 mg) by mouth 2 times daily (Patient taking differently: Take 10 mg by mouth 2 times daily ) 60 tablet 1     montelukast (SINGULAIR) 10 MG tablet Take 10 mg by mouth daily       prazosin (MINIPRESS) 2 MG capsule Take 1 capsule (2 mg) by mouth At Bedtime (Patient taking differently: Take 2 mg by mouth At Bedtime Taking 4 mg at bedtime) 30 capsule 1     ziprasidone (GEODON) 60 MG capsule Take 60 mg by mouth       clobetasol (TEMOVATE) 0.05 % CREA cream Apply topically 2 times daily as needed for eczema       clonazePAM (KLONOPIN) 1 MG tablet Take 1 tablet (1 mg) by mouth 2 times daily as needed for anxiety 60 tablet 0     folic acid (FOLVITE) 1 MG tablet Take 5 tablets (5 mg) by mouth daily 250 tablet 0     ibuprofen (ADVIL/MOTRIN) 200 MG tablet Take 800 mg by mouth every 6 hours as needed (for neck/back pain)        LEVONORGESTREL-ETHINYL ESTRAD PO Take 1 tablet by mouth daily Lessina       mirtazapine (REMERON) 15 MG tablet Take 1 tablet (15 mg) by mouth At Bedtime 30 tablet 1     ondansetron (ZOFRAN-ODT) 8 MG ODT tab Take 8 mg by mouth every 8 hours as needed        polyethylene glycol (MIRALAX) 17 GM/Dose powder Take 1 capful by mouth 2 times daily       traZODone (DESYREL) 100 MG tablet Take 50 mg by mouth At Bedtime        ziprasidone (GEODON) 60 MG capsule Take 1 capsule (60 mg) by mouth 2 times daily (with meals) 60 capsule 1       Vitals                                                                                                                       3, 3   LMP  (LMP Unknown)      Mental Status Exam                                                                                    9, 14 cog gs     Alertness: alert  and oriented  Appearance: casually groomed and no make-up  Behavior/Demeanor: cooperative and pleasant, with good  eye contact   Speech: normal  and regular rate and rhythm  Language: intact and no obvious problem  Psychomotor: normal or unremarkable  Mood: depressed  Affect: restricted; was congruent to mood; was congruent to content  Thought Process/Associations: organized  Thought Content:  Reports suicidal ideation, preoccupations with body image;  Denies violent ideation  Perception:  Reports none;  Denies auditory hallucinations  Insight: fair  Judgment: fair  Cognition: (6) does  appear grossly intact; formal cognitive testing was not done  Gait/Station and/or Muscle Strength/Tone: difficult to assess in seated video visit    Labs and Data                                                                                                                 Rating Scales:    PHQ9    PHQ9 Today:  23  PHQ 1/10/2020 2/3/2020 9/28/2020   PHQ-9 Total Score 21 21 23   Q9: Thoughts of better off dead/self-harm past 2 weeks Nearly every day More than half the days Nearly every day         Diagnosis and Assessment                                                                             m2, h3     Today the following issues were addressed:    Major depressive disorder recurrent   Borderline personality disorder  EDNOS    21yo female with hsitroy of multple hospitalizations and 2 suicide attempts int he last month reports severe distress and persistent suicidal ideation. She has history of ECT response but protracted delirium but declines return to ECT at this time.  with histroy of good response to ECT but protracted delirium. She declines ECT at this time.  She continues to seek psychotherapist and I highly encourage combining evidence based treatment modalities. Upon reflection she believes her cycle of ketamine treatment last in 5./2020 was more helpful than she realized until her effects started to wear down  In late July and August 2020. She would like to resume ketamine infusions and agree this is a reasonable acute treatment. For long term treatment we  reviewed recommendation for VNS stimulation and she consents to begin the authorization process.  [place] Prescription Monitoring Program [] review was not needed today.    Drug Interaction Management: Monitoring for adverse effects    Plan                                                                                                                    m2, h3      1) Major Depressive Disorder  -- Medication:   Continue current oral medications.  Patient and Dr Jes Rey to consider trial of naltrexone for SIB    START  Administer IV ketamine infusion at 0.5 mg /kg ideal body weight 2 times per week for 2 weeks. Then move to once weekly for 2 weeks and later move to once every 4 weeks.    -- Psychotherapy: Highly encourage care connection to psychotherapy    Procedures:  VNS referral. Begin authorization process. Clinic staff to present patient with consent forms to communicate with 3rd party authorization services    RTC: 8wks    CRISIS NUMBERS:   Provided routinely in AVS.    Treatment Risk Statement:  The patient understands the risks, benefits, adverse effects and alternatives. Agrees to treatment with the capacity to do so. No medical contraindications to treatment. Agrees to call clinic for any problems. The patient understands to call 911 or go to the nearest ED if life threatening or urgent symptoms occur.      Narciso Darby MD Patient seen and recommendations discussed and approved by    Zita Fregoso MD      Physician Attestation   I, Zita Fregoso MD, saw this patient and agree with the findings and plan of care as documented in the note.        Zita Fregoso MD

## 2020-10-09 ENCOUNTER — TELEPHONE (OUTPATIENT)
Dept: PSYCHIATRY | Facility: CLINIC | Age: 20
End: 2020-10-09

## 2020-10-13 ENCOUNTER — TELEPHONE (OUTPATIENT)
Dept: PSYCHIATRY | Facility: CLINIC | Age: 20
End: 2020-10-13

## 2020-10-13 NOTE — TELEPHONE ENCOUNTER
What is the concern that needs to be addressed by a nurse?  from [facility] would like a call back to discuss patient intentional over dose and moving forward with a plan    May a detailed message be left on voicemail?     Date of last office visit: 09/28/2020    Message routed to: [facility]

## 2020-10-14 NOTE — TELEPHONE ENCOUNTER
"Writer returned call to Dr. Batres back.  She reports that since they did not get a call back yesterday, they consulted their inpatient team.  Patient overdosed on 3/4 of a bottle of 500 tablet aspirin.  The inpatient team felt that this situation was more Borderline Personality disorder, as patient was \"happy go kimmy\" yesterday which was a huge change from the previous day where patient was more down and not feeling well.       -patient is restarting her ketamine infusions tomorrow.        -Will route to Dr. Fregoso     "

## 2020-10-15 ENCOUNTER — INFUSION THERAPY VISIT (OUTPATIENT)
Dept: INFUSION THERAPY | Facility: CLINIC | Age: 20
End: 2020-10-15
Attending: PSYCHIATRY & NEUROLOGY
Payer: COMMERCIAL

## 2020-10-15 VITALS
RESPIRATION RATE: 16 BRPM | OXYGEN SATURATION: 99 % | TEMPERATURE: 98.4 F | HEART RATE: 96 BPM | WEIGHT: 204.2 LBS | BODY MASS INDEX: 34.86 KG/M2 | DIASTOLIC BLOOD PRESSURE: 85 MMHG | HEIGHT: 64 IN | SYSTOLIC BLOOD PRESSURE: 130 MMHG

## 2020-10-15 DIAGNOSIS — F33.2 SEVERE EPISODE OF RECURRENT MAJOR DEPRESSIVE DISORDER, WITHOUT PSYCHOTIC FEATURES (H): Primary | ICD-10-CM

## 2020-10-15 PROCEDURE — 258N000003 HC RX IP 258 OP 636: Performed by: PSYCHIATRY & NEUROLOGY

## 2020-10-15 PROCEDURE — 96365 THER/PROPH/DIAG IV INF INIT: CPT

## 2020-10-15 PROCEDURE — 250N000009 HC RX 250: Performed by: PSYCHIATRY & NEUROLOGY

## 2020-10-15 RX ORDER — HYDRALAZINE HYDROCHLORIDE 20 MG/ML
10 INJECTION INTRAMUSCULAR; INTRAVENOUS
Status: CANCELLED | OUTPATIENT
Start: 2020-10-15

## 2020-10-15 RX ORDER — ONDANSETRON 2 MG/ML
4 INJECTION INTRAMUSCULAR; INTRAVENOUS
Status: CANCELLED | OUTPATIENT
Start: 2020-10-15

## 2020-10-15 RX ORDER — HEPARIN SODIUM,PORCINE 10 UNIT/ML
5 VIAL (ML) INTRAVENOUS
Status: CANCELLED | OUTPATIENT
Start: 2020-10-15

## 2020-10-15 RX ORDER — HEPARIN SODIUM (PORCINE) LOCK FLUSH IV SOLN 100 UNIT/ML 100 UNIT/ML
5 SOLUTION INTRAVENOUS
Status: CANCELLED | OUTPATIENT
Start: 2020-10-15

## 2020-10-15 RX ADMIN — SODIUM CHLORIDE 28 MG: 9 INJECTION, SOLUTION INTRAVENOUS at 15:42

## 2020-10-15 ASSESSMENT — MIFFLIN-ST. JEOR: SCORE: 1671.25

## 2020-10-15 NOTE — PROGRESS NOTES
Nursing Note  Yoana Webber [facility] for:   Chief Complaint   Patient presents with     Infusion     Ketamine     During today's [facility] appointment, orders from Dr. Narciso Darby were completed.  Frequency: 2 times/week for 2 weeks. Then once weekly for 2 weeks, then once every 4 weeks    Progress note:  Patient identification verified by name and date of birth.  Assessment completed.  Vitals recorded in Doc Flowsheets.  Patient was provided with education regarding medication/procedure and possible side effects.  Patient verbalized understanding.     present during visit today: Not Applicable.    Treatment Conditions: Vital signs monitored every 15 minutes while Ketamine infusing and every 15 minutes during 1 hour observation period. Continuous pulse oximetry. Confirms she has ride home.    Premedications: were not ordered.    Drug Waste Record: No    Infusion length and rate: Ketamine infusion given over approximately 40 minutes at 84ml/hr. 250mLs NS infused over 1 hour during observation period.    Labs: were not ordered for this appointment.    Vascular access: peripheral IV placed today.    Post Infusion Assessment:  Patient tolerated infusion without incident.     Discharge Plan:   Follow up plan of care with: ongoing infusions at [facility]., ordering provider as scheduled. and after visit summary declined by patient  Discharge instructions were reviewed with patient.  Patient/representative verbalized understanding of discharge instructions and all questions answered.  Patient discharged from [facility] in stable condition.    Zeny Medina RN       Administrations This Visit     ketamine (KETALAR) 28 mg in sodium chloride 0.9 % 56 mL intermittent infusion     Admin Date  10/15/2020 Action  New Bag Dose  28 mg Rate  84 mL/hr Route  Intravenous  "Administered By  Zeny Rai, RN                /87   Pulse 103   Temp 98.4  F (36.9  C) (Oral)   Resp 16   Ht 1.626 m (5' 4\")   Wt 92.6 kg (204 lb 3.2 oz)   SpO2 100%   BMI 35.05 kg/m      "

## 2020-10-15 NOTE — LETTER
10/15/2020         RE: Yoana Webber  [address] [place] [id number]        Dear Colleague,    Thank you for referring your patient, Yoana Webber, to the [facility]. Please see a copy of my visit note below.    Nursing Note  Yoana Webber presents [facility] for:   Chief Complaint   Patient presents with     Infusion     Ketamine     During [facility]'s [facility] appointment, orders from Dr. Narciso Darby were completed.  Frequency: 2 times/week for 2 weeks. Then once weekly for 2 weeks, then once every 4 weeks    Progress note:  Patient identification verified by name and date of birth.  Assessment completed.  Vitals recorded in Doc Flowsheets.  Patient was provided with education regarding medication/procedure and possible side effects.  Patient verbalized understanding.     present during visit today: Not Applicable.    Treatment Conditions: Vital signs monitored every 15 minutes while Ketamine infusing and every 15 minutes during 1 hour observation period. Continuous pulse oximetry. Confirms she has ride home.    Premedications: were not ordered.    Drug Waste Record: No    Infusion length and rate: Ketamine infusion given over approximately 40 minutes at 84ml/hr. 250mLs NS infused over 1 hour during observation period.    Labs: were not ordered for this appointment.    Vascular access: peripheral IV placed today.    Post Infusion Assessment:  Patient tolerated infusion without incident.     Discharge Plan:   Follow up plan of care with: ongoing infusions at [facility]., ordering provider as scheduled. and after visit summary declined by patient  Discharge instructions were reviewed with patient.  Patient/representative verbalized understanding of discharge instructions and all questions answered.  [facility] in stable condition.    Zeny Rai, VIKTOR       Administrations This Visit     ketamine (KETALAR) 28 mg in sodium chloride 0.9 % 56 mL intermittent infusion     Admin Date  10/15/2020 Action  New Bag Dose  28 mg Rate  84 mL/hr Route  Intravenous Administered By  Zeny Rai, RN                /87   Pulse 103   Temp 98.4  F (36.9  C) (Oral)   Resp 16   Ht 1.626 m (5' 4\")   Wt 92.6 kg (204 lb 3.2 oz)   SpO2 100%   BMI 35.05 kg/m          Again, thank you for allowing me to participate in the care of your patient.        Sincerely,        [facility]    "

## 2020-10-15 NOTE — PATIENT INSTRUCTIONS
Dear Yoana Webber    Thank you for choosing [facility] ([facility]) for your Ketamine infusion.  The following information is a summary of our appointment as well as important reminders.      Patient Education     Ketamine injection  Brand Name: Ketalar  What is this medicine?  Ketamine (JETT ta meen) is an anesthetic. It is used to produce sleep before and during surgery.  How should I use this medicine?  This medicine is for injection into a vein. It is given by a health care professional in a hospital or clinic setting.  Talk to your pediatrician regarding the use of this medicine in children. While this drug may be prescribed for children as young as 16 years for selected conditions, precautions do apply.  What side effects may I notice from receiving this medicine?  Side effects that you should report to your doctor or health care professional as soon as possible:    allergic reactions like skin rash, itching or hives, swelling of the face, lips, or tongue    breathing problems    changes in vision    fast heartbeat    hallucination, loss of contact with reality    signs and symptoms of low blood pressure like dizziness; feeling faint or lightheaded, falls; unusually weak or tired    uncontrollable head, mouth, neck, arm, or leg movements    unusually slow heartbeat  Side effects that usually do not require medical attention (report to your doctor or health care professional if they continue or are bothersome):    anxious    excessive saliva production    upset stomach  What may interact with this medicine?  Do not take this medicine with any of the following medications:    MAOIs like Carbex, Eldepryl, Marplan, Nardil, and Parnate    [place]'s Wort  This medicine may also interact with the following medications:    alcohol    antihistamines for allergy, cough, and cold    certain medicines for blood pressure    certain medicines for depression,  anxiety, or psychotic disturbances    general anesthetics    medicines that relax muscles for surgery    memantine    narcotic medicines for pain    phenothiazines like chlorpromazine, mesoridazine, prochlorperazine, thioridazine    pregabalin    theophylline; aminophylline    thyroid hormones  What if I miss a dose?  This does not apply.  Where should I keep my medicine?  This drug is given in a hospital or clinic and will not be stored at home.  What should I tell my health care provider before I take this medicine?  They need to know if you have any of these conditions:    bleeding in the brain    brain tumor    dehydration    head injury    heart disease    high blood pressure    history of stroke    if you often drink alcohol    mental illness    an unusual or allergic reaction to ketamine, other medicines, foods, dyes, or preservatives    pregnant or trying to get pregnant    breast-feeding  What should I watch for while using this medicine?  Your condition will be monitored carefully while you are receiving this medicine.  You may get dizzy. Do not drive, use machinery, or do anything that needs mental alertness until you know how this medicine affects you. Do not stand or sit up quickly, especially if you are an older patient. This reduces the risk of dizzy or fainting spells. Avoid alcoholic drinks; they can make you more dizzy.  NOTE:This sheet is a summary. It may not cover all possible information. If you have questions about this medicine, talk to your doctor, pharmacist, or health care provider. Copyright  2019 [employer]             We look forward in seeing you on your next appointment here at [facility] ([facility]).  Please don t hesitate to call us at [phone] to reschedule any of your appointments or to speak with one of the [facility] registered nurses.  It was a pleasure taking care of you today.    Sincerely,    [facility]  [facility]  [address] [place] [address]  [address]  Phone:  [phone]

## 2020-10-19 ENCOUNTER — INFUSION THERAPY VISIT (OUTPATIENT)
Dept: INFUSION THERAPY | Facility: CLINIC | Age: 20
End: 2020-10-19
Attending: PSYCHIATRY & NEUROLOGY
Payer: COMMERCIAL

## 2020-10-19 VITALS
RESPIRATION RATE: 16 BRPM | BODY MASS INDEX: 35.53 KG/M2 | HEART RATE: 85 BPM | SYSTOLIC BLOOD PRESSURE: 119 MMHG | OXYGEN SATURATION: 98 % | DIASTOLIC BLOOD PRESSURE: 86 MMHG | WEIGHT: 207 LBS

## 2020-10-19 DIAGNOSIS — F33.2 SEVERE EPISODE OF RECURRENT MAJOR DEPRESSIVE DISORDER, WITHOUT PSYCHOTIC FEATURES (H): Primary | ICD-10-CM

## 2020-10-19 PROCEDURE — 99207 PR NO CHARGE LOS: CPT

## 2020-10-19 PROCEDURE — 96365 THER/PROPH/DIAG IV INF INIT: CPT

## 2020-10-19 PROCEDURE — 258N000003 HC RX IP 258 OP 636: Performed by: PSYCHIATRY & NEUROLOGY

## 2020-10-19 PROCEDURE — 250N000009 HC RX 250: Performed by: PSYCHIATRY & NEUROLOGY

## 2020-10-19 RX ORDER — HEPARIN SODIUM (PORCINE) LOCK FLUSH IV SOLN 100 UNIT/ML 100 UNIT/ML
5 SOLUTION INTRAVENOUS
Status: CANCELLED | OUTPATIENT
Start: 2020-10-19

## 2020-10-19 RX ORDER — HEPARIN SODIUM,PORCINE 10 UNIT/ML
5 VIAL (ML) INTRAVENOUS
Status: CANCELLED | OUTPATIENT
Start: 2020-10-19

## 2020-10-19 RX ORDER — HYDRALAZINE HYDROCHLORIDE 20 MG/ML
10 INJECTION INTRAMUSCULAR; INTRAVENOUS
Status: CANCELLED | OUTPATIENT
Start: 2020-10-19

## 2020-10-19 RX ORDER — ONDANSETRON 2 MG/ML
4 INJECTION INTRAMUSCULAR; INTRAVENOUS
Status: CANCELLED | OUTPATIENT
Start: 2020-10-19

## 2020-10-19 RX ADMIN — SODIUM CHLORIDE 250 ML: 9 INJECTION, SOLUTION INTRAVENOUS at 15:26

## 2020-10-19 RX ADMIN — SODIUM CHLORIDE 27.5 MG: 9 INJECTION, SOLUTION INTRAVENOUS at 15:24

## 2020-10-19 NOTE — LETTER
10/19/2020         RE: Yoana Webber  [address] [place] [address]        Dear Colleague,    Thank you for referring your patient, Yoana Webber, to the [facility] TREATMENT [facility]. Please see a copy of my visit note below.    Nursing Note  Yoana Webber presents [facility] for:   Chief Complaint   Patient presents with     Infusion     ketamine     During [facility]'s [facility] appointment, orders from Dr Fregoso were completed.  Frequency: 2-3 times weekly    Progress note:  Patient identification verified by name and date of birth.  Assessment completed.  Vitals recorded in Doc Flowsheets.  Patient was provided with education regarding medication/procedure and possible side effects.  Patient verbalized understanding.     present during visit today: Not Applicable.    Treatment Conditions: Patient monitored for 60 minutes after medication was administered.  Has a ride home.    Premedications: were not ordered.    Drug Waste Record: No    Infusion length and rate:  infusion given over approximately 40 minutes    Labs: were not ordered for this appointment.    Vascular access: peripheral IV placed today.    Post Infusion Assessment:  Patient tolerated infusion without incident.  No evidence of extravasations.  Access discontinued per protocol.     Discharge Plan:   Follow up plan of care with: ongoing infusions at [facility].  Discharge instructions were reviewed with patient.  Patient/representative verbalized understanding of discharge instructions and all questions answered.  Patient discharged from [facility] in stable condition.    CHRISTY VANG, VIKTOR    Administrations This Visit     0.9% sodium chloride BOLUS     Admin Date  10/19/2020 Action  New Bag Dose  250 mL Route  Intravenous Administered By  Christy Vang, RN          ketamine (KETALAR)  0.5 mg/kg = 27.5 mg in sodium chloride 0.9 % 56 mL intermittent infusion     Admin Date  10/19/2020 Action  New Bag Dose  27.5 mg Rate  84 mL/hr Route  Intravenous Administered By  Christy Baldwin RN                BP (!) 131/92   Pulse 82   Resp 16   Wt 93.9 kg (207 lb)   SpO2 97%   BMI 35.53 kg/m            Again, thank you for allowing me to participate in the care of your patient.        Sincerely,        [facility]

## 2020-10-19 NOTE — PROGRESS NOTES
Nursing Note  Yoana Webber [facility] for:   Chief Complaint   Patient presents with     Infusion     ketamine     During [facility]'s [facility] appointment, orders from Dr Fregoso were completed.  Frequency: 2-3 times weekly    Progress note:  Patient identification verified by name and date of birth.  Assessment completed.  Vitals recorded in Doc Flowsheets.  Patient was provided with education regarding medication/procedure and possible side effects.  Patient verbalized understanding.     present during visit today: Not Applicable.    Treatment Conditions: Patient monitored for 60 minutes after medication was administered.  Has a ride home.    Premedications: were not ordered.    Drug Waste Record: No    Infusion length and rate:  infusion given over approximately 40 minutes    Labs: were not ordered for this appointment.    Vascular access: peripheral IV placed today.    Post Infusion Assessment:  Patient tolerated infusion without incident.  No evidence of extravasations.  Access discontinued per protocol.     Discharge Plan:   Follow up plan of care with: ongoing infusions at [facility].  Discharge instructions were reviewed with patient.  Patient/representative verbalized understanding of discharge instructions and all questions answered.  Patient discharged from [facility] in stable condition.    CHRISTY VANG RN    Administrations This Visit     0.9% sodium chloride BOLUS     Admin Date  10/19/2020 Action  New Bag Dose  250 mL Route  Intravenous Administered By  Christy Vang RN          ketamine (KETALAR) 0.5 mg/kg = 27.5 mg in sodium chloride 0.9 % 56 mL intermittent infusion     Admin Date  10/19/2020 Action  New Bag Dose  27.5 mg Rate  84 mL/hr Route  Intravenous Administered By  Christy Vang RN                BP (!) 131/92   Pulse 82   Resp 16   Wt 93.9 kg  (207 lb)   SpO2 97%   BMI 35.53 kg/m

## 2020-10-22 ENCOUNTER — INFUSION THERAPY VISIT (OUTPATIENT)
Dept: INFUSION THERAPY | Facility: CLINIC | Age: 20
End: 2020-10-22
Attending: PSYCHIATRY & NEUROLOGY
Payer: COMMERCIAL

## 2020-10-22 VITALS
RESPIRATION RATE: 16 BRPM | TEMPERATURE: 98.6 F | SYSTOLIC BLOOD PRESSURE: 129 MMHG | OXYGEN SATURATION: 96 % | HEART RATE: 88 BPM | DIASTOLIC BLOOD PRESSURE: 82 MMHG

## 2020-10-22 DIAGNOSIS — F33.2 SEVERE EPISODE OF RECURRENT MAJOR DEPRESSIVE DISORDER, WITHOUT PSYCHOTIC FEATURES (H): Primary | ICD-10-CM

## 2020-10-22 PROCEDURE — 258N000003 HC RX IP 258 OP 636: Performed by: PSYCHIATRY & NEUROLOGY

## 2020-10-22 PROCEDURE — 99207 PR NO CHARGE LOS: CPT

## 2020-10-22 PROCEDURE — 96365 THER/PROPH/DIAG IV INF INIT: CPT

## 2020-10-22 PROCEDURE — 250N000009 HC RX 250: Performed by: PSYCHIATRY & NEUROLOGY

## 2020-10-22 RX ORDER — HYDRALAZINE HYDROCHLORIDE 20 MG/ML
10 INJECTION INTRAMUSCULAR; INTRAVENOUS
Status: CANCELLED | OUTPATIENT
Start: 2020-10-22

## 2020-10-22 RX ORDER — HEPARIN SODIUM,PORCINE 10 UNIT/ML
5 VIAL (ML) INTRAVENOUS
Status: CANCELLED | OUTPATIENT
Start: 2020-10-22

## 2020-10-22 RX ORDER — HEPARIN SODIUM (PORCINE) LOCK FLUSH IV SOLN 100 UNIT/ML 100 UNIT/ML
5 SOLUTION INTRAVENOUS
Status: CANCELLED | OUTPATIENT
Start: 2020-10-22

## 2020-10-22 RX ORDER — ONDANSETRON 2 MG/ML
4 INJECTION INTRAMUSCULAR; INTRAVENOUS
Status: CANCELLED | OUTPATIENT
Start: 2020-10-22

## 2020-10-22 RX ADMIN — SODIUM CHLORIDE 47 MG: 9 INJECTION, SOLUTION INTRAVENOUS at 15:21

## 2020-10-22 RX ADMIN — SODIUM CHLORIDE 250 ML: 9 INJECTION, SOLUTION INTRAVENOUS at 15:26

## 2020-10-22 NOTE — PHARMACY
Infusion was prepared using actual body weight. 47 mg/56 ml. Nurse caught error in time so the correct dose of 27 mg/32 ml was infused.

## 2020-10-22 NOTE — PROGRESS NOTES
"Nursing Note  Yoana Webber [facility] for:   Chief Complaint   Patient presents with     Infusion     ketamine     During today's [facility] appointment, orders from Dr. Fregoso were completed.  Frequency: twice weekly x 2 weeks then weekly x 2 weeks then q4 weeks    Progress note:  Patient identification verified by name and date of birth.  Assessment completed.  Vitals recorded in Doc Flowsheets.  Patient was provided with education regarding medication/procedure and possible side effects.  Patient verbalized understanding.     present during visit today: Not Applicable.    Treatment Conditions: Patient has ride home.  No SI.  Monitored x 1 hour post infusion    Premedications: were not ordered.    Drug Waste Record: Yes      Drug Name: Ketamine  Dose: 27mg  Route administered: IV  Amount of waste(mL):24  Reason for waste: modifying dosage   Waste witnessed by Breann HOANG and Fernando Pharmacist.    Infusion length and rate:  infusion given over approximately 40 minutes  84 ml/hr then reduced to 48mL/hr to give lower dosage.    Labs: were not ordered for this appointment.    Vascular access: peripheral IV placed today.    Post Infusion Assessment:  Patient tolerated infusion without incident.     Patient stated she felt anxious towards the end of appointment, brought up the use of the words \"ideal body weight\" in therapy plan. Patient self administered home anti-anxiety medications.    Discharge Plan:   Follow up plan of care with: ongoing infusions at [facility]. and primary care provider,  Discharge instructions were reviewed with patient.  Patient/representative verbalized understanding of discharge instructions and all questions answered.  Patient discharged from [facility] in stable condition.    Carmen Wilkinson RN       Administrations This Visit     0.9% sodium " chloride BOLUS     Admin Date  10/22/2020 Action  New Bag Dose  250 mL Rate  1,000 mL/hr Route  Intravenous Administered By  Carmen Wilkinson RN          ketamine (KETALAR) 0.5 mg/kg = 47 mg in sodium chloride 0.9 % 56 mL intermittent infusion     Admin Date  10/22/2020 Action  New Bag Dose  47 mg Rate  84 mL/hr Route  Intravenous Administered By  Carmen Wilkinson RN           Admin Date  10/22/2020 Action  Restarted Dose   Rate  48 mL/hr Route  Intravenous Administered By  Carmen Wilkinson, VIKTOR                  /86   Pulse 95   Temp 98.6  F (37  C) (Oral)   Resp 16   SpO2 98%

## 2020-10-22 NOTE — LETTER
"    10/22/2020         RE: Yoana Webber  [address] [place] [address]        Dear Colleague,    Thank you for referring your patient, Yoana Webber, to the [facility] ADVANCED TREATMENT [facility]. Please see a copy of my visit note below.    Nursing Note  Yoana Webber presents [facility] for:   Chief Complaint   Patient presents with     Infusion     ketamine     During [facility]'s [facility] appointment, orders from Dr. Fregoso were completed.  Frequency: twice weekly x 2 weeks then weekly x 2 weeks then q4 weeks    Progress note:  Patient identification verified by name and date of birth.  Assessment completed.  Vitals recorded in Doc Flowsheets.  Patient was provided with education regarding medication/procedure and possible side effects.  Patient verbalized understanding.     present during visit today: Not Applicable.    Treatment Conditions: Patient has ride home.  No SI.  Monitored x 1 hour post infusion    Premedications: were not ordered.    Drug Waste Record: Yes      Drug Name: Ketamine  Dose: 27mg  Route administered: IV  Amount of waste(mL):24  Reason for waste: modifying dosage   Waste witnessed by Breann HOANG and Fernando Pharmacist.    Infusion length and rate:  infusion given over approximately 40 minutes  84 ml/hr then reduced to 48mL/hr to give lower dosage.    Labs: were not ordered for this appointment.    Vascular access: peripheral IV placed today.    Post Infusion Assessment:  Patient tolerated infusion without incident.     Patient stated she felt anxious towards the end of appointment, brought up the use of the words \"ideal body weight\" in therapy plan. Patient self administered home anti-anxiety medications.    Discharge Plan:   Follow up plan of care with: ongoing infusions at [facility]. and primary care provider,  Discharge instructions were reviewed with " patient.  Patient/representative verbalized understanding of discharge instructions and all questions answered.  Patient discharged [facility] in stable condition.    Carmen Wilkinson RN       Administrations This Visit     0.9% sodium chloride BOLUS     Admin Date  10/22/2020 Action  New Bag Dose  250 mL Rate  1,000 mL/hr Route  Intravenous Administered By  Carmen Wilkinson RN          ketamine (KETALAR) 0.5 mg/kg = 47 mg in sodium chloride 0.9 % 56 mL intermittent infusion     Admin Date  10/22/2020 Action  New Bag Dose  47 mg Rate  84 mL/hr Route  Intravenous Administered By  Carmen Wilkinson RN           Admin Date  10/22/2020 Action  Restarted Dose   Rate  48 mL/hr Route  Intravenous Administered By  Carmen Wilkinson, VIKTOR                  /86   Pulse 95   Temp 98.6  F (37  C) (Oral)   Resp 16   SpO2 98%         Again, thank you for allowing me to participate in the care of your patient.        Sincerely,        [facility]

## 2020-10-26 ENCOUNTER — INFUSION THERAPY VISIT (OUTPATIENT)
Dept: INFUSION THERAPY | Facility: CLINIC | Age: 20
End: 2020-10-26
Attending: PSYCHIATRY & NEUROLOGY
Payer: COMMERCIAL

## 2020-10-26 VITALS
OXYGEN SATURATION: 98 % | WEIGHT: 204.8 LBS | RESPIRATION RATE: 16 BRPM | DIASTOLIC BLOOD PRESSURE: 80 MMHG | SYSTOLIC BLOOD PRESSURE: 116 MMHG | BODY MASS INDEX: 35.15 KG/M2 | HEART RATE: 91 BPM | TEMPERATURE: 98.5 F

## 2020-10-26 DIAGNOSIS — F33.2 SEVERE EPISODE OF RECURRENT MAJOR DEPRESSIVE DISORDER, WITHOUT PSYCHOTIC FEATURES (H): Primary | ICD-10-CM

## 2020-10-26 PROCEDURE — 250N000009 HC RX 250: Performed by: PSYCHIATRY & NEUROLOGY

## 2020-10-26 PROCEDURE — 96365 THER/PROPH/DIAG IV INF INIT: CPT

## 2020-10-26 PROCEDURE — 99207 PR NO CHARGE LOS: CPT

## 2020-10-26 PROCEDURE — 258N000003 HC RX IP 258 OP 636: Performed by: PSYCHIATRY & NEUROLOGY

## 2020-10-26 RX ORDER — HEPARIN SODIUM (PORCINE) LOCK FLUSH IV SOLN 100 UNIT/ML 100 UNIT/ML
5 SOLUTION INTRAVENOUS
Status: CANCELLED | OUTPATIENT
Start: 2020-10-26

## 2020-10-26 RX ORDER — HEPARIN SODIUM,PORCINE 10 UNIT/ML
5 VIAL (ML) INTRAVENOUS
Status: DISCONTINUED | OUTPATIENT
Start: 2020-10-26 | End: 2020-10-26 | Stop reason: CLARIF

## 2020-10-26 RX ORDER — HYDRALAZINE HYDROCHLORIDE 20 MG/ML
10 INJECTION INTRAMUSCULAR; INTRAVENOUS
Status: CANCELLED | OUTPATIENT
Start: 2020-10-26

## 2020-10-26 RX ORDER — ONDANSETRON 2 MG/ML
4 INJECTION INTRAMUSCULAR; INTRAVENOUS
Status: CANCELLED | OUTPATIENT
Start: 2020-10-26

## 2020-10-26 RX ORDER — HEPARIN SODIUM (PORCINE) LOCK FLUSH IV SOLN 100 UNIT/ML 100 UNIT/ML
5 SOLUTION INTRAVENOUS
Status: DISCONTINUED | OUTPATIENT
Start: 2020-10-26 | End: 2020-10-26 | Stop reason: CLARIF

## 2020-10-26 RX ORDER — HEPARIN SODIUM,PORCINE 10 UNIT/ML
5 VIAL (ML) INTRAVENOUS
Status: CANCELLED | OUTPATIENT
Start: 2020-10-26

## 2020-10-26 RX ADMIN — SODIUM CHLORIDE 27.5 MG: 9 INJECTION, SOLUTION INTRAVENOUS at 15:40

## 2020-10-26 RX ADMIN — SODIUM CHLORIDE 250 ML: 9 INJECTION, SOLUTION INTRAVENOUS at 16:27

## 2020-10-26 NOTE — LETTER
10/26/2020         RE: Yoana Webber  [address] [place] [id number]        Dear Colleague,    Thank you for referring your patient, Yoana Webber, to the [facility] ADVANCED TREATMENT [facility]. Please see a copy of my visit note below.    Nursing Note  Yoana Webber presents [facility] for:   Chief Complaint   Patient presents with     Infusion     Ketamine (ketalar)     During [facility]'s [facility] appointment, orders from Dr. Fregoso were completed.  Frequency: weekly    Progress note:  Patient identification verified by name and date of birth.  Assessment completed.  Vitals recorded in Doc Flowsheets.  Patient was provided with education regarding medication/procedure and possible side effects.  Patient verbalized understanding.     present during visit today: Not Applicable.    Treatment Conditions: patient denies suicide plan  Premedications: were not ordered.  Drug Waste Record: No  Infusion length and rate:  84 ml/hr., over 40 minutes, observation 1 hour, 200 ml NS given during observation  Labs: were not ordered for this appointment.  Vascular access: peripheral IV placed today.    Post Infusion Assessment:  Patient tolerated infusion without incident.  Patient stated when the infusion was complete that she did not feel anything, I told her I would send the ordering provider a message to that statement and gave her the [facility] clinic number.  Site patent and intact, free from redness, edema or discomfort.  No evidence of extravasations.  Access discontinued per protocol.     Discharge Plan:   Follow up plan of care with: ongoing infusions at [facility].  Discharge instructions were reviewed with patient.  Patient/representative verbalized understanding of discharge instructions and all questions answered.  Patient discharged from [facility] in stable  condition.    Maureen Singh, VIKTOR    Administrations This Visit     ketamine (KETALAR) 0.5 mg/kg = 27.5 mg in sodium chloride 0.9 % 50.55 mL intermittent infusion     Admin Date  10/26/2020 Action  New Bag Dose  27.5 mg Rate  75.8 mL/hr Route  Intravenous Administered By  Maureen Singh RN                /78   Pulse 81   Temp 98.5  F (36.9  C) (Oral)   Resp 16   Wt 92.9 kg (204 lb 12.8 oz)   SpO2 97%   BMI 35.15 kg/m            Again, thank you for allowing me to participate in the care of your patient.        Sincerely,        [facility]

## 2020-10-26 NOTE — PROGRESS NOTES
Nursing Note  Yoana Webber presents [facility] for:   Chief Complaint   Patient presents with     Infusion     Ketamine (ketalar)     During [facility]'s [facility] appointment, orders from Dr. Fregoso were completed.  Frequency: weekly    Progress note:  Patient identification verified by name and date of birth.  Assessment completed.  Vitals recorded in Doc Flowsheets.  Patient was provided with education regarding medication/procedure and possible side effects.  Patient verbalized understanding.     present during visit today: Not Applicable.    Treatment Conditions: patient denies suicide plan  Premedications: were not ordered.  Drug Waste Record: No  Infusion length and rate:  84 ml/hr., over 40 minutes, observation 1 hour, 200 ml NS given during observation  Labs: were not ordered for this appointment.  Vascular access: peripheral IV placed today.    Post Infusion Assessment:  Patient tolerated infusion without incident.  Patient stated when the infusion was complete that she did not feel anything, I told her I would send the ordering provider a message to that statement and gave her the doctors clinic number.  Site patent and intact, free from redness, edema or discomfort.  No evidence of extravasations.  Access discontinued per protocol.     Discharge Plan:   Follow up plan of care with: ongoing infusions at [facility].  Discharge instructions were reviewed with patient.  Patient/representative verbalized understanding of discharge instructions and all questions answered.  Patient discharged from [facility] in stable condition.    Maureen Singh RN    Administrations This Visit     ketamine (KETALAR) 0.5 mg/kg = 27.5 mg in sodium chloride 0.9 % 50.55 mL intermittent infusion     Admin Date  10/26/2020 Action  New Bag Dose  27.5 mg Rate  75.8 mL/hr Route  Intravenous  Administered By  Maureen Singh RN                /78   Pulse 81   Temp 98.5  F (36.9  C) (Oral)   Resp 16   Wt 92.9 kg (204 lb 12.8 oz)   SpO2 97%   BMI 35.15 kg/m

## 2020-11-03 ENCOUNTER — INFUSION THERAPY VISIT (OUTPATIENT)
Dept: INFUSION THERAPY | Facility: CLINIC | Age: 20
End: 2020-11-03
Attending: PSYCHIATRY & NEUROLOGY
Payer: COMMERCIAL

## 2020-11-03 VITALS
OXYGEN SATURATION: 100 % | SYSTOLIC BLOOD PRESSURE: 125 MMHG | WEIGHT: 209 LBS | HEART RATE: 90 BPM | TEMPERATURE: 98.8 F | BODY MASS INDEX: 35.87 KG/M2 | DIASTOLIC BLOOD PRESSURE: 84 MMHG

## 2020-11-03 DIAGNOSIS — F33.2 SEVERE EPISODE OF RECURRENT MAJOR DEPRESSIVE DISORDER, WITHOUT PSYCHOTIC FEATURES (H): Primary | ICD-10-CM

## 2020-11-03 PROCEDURE — 250N000009 HC RX 250: Performed by: PSYCHIATRY & NEUROLOGY

## 2020-11-03 PROCEDURE — 258N000003 HC RX IP 258 OP 636: Performed by: PSYCHIATRY & NEUROLOGY

## 2020-11-03 PROCEDURE — 99207 PR NO CHARGE LOS: CPT

## 2020-11-03 PROCEDURE — 96365 THER/PROPH/DIAG IV INF INIT: CPT

## 2020-11-03 RX ORDER — ONDANSETRON 2 MG/ML
4 INJECTION INTRAMUSCULAR; INTRAVENOUS
Status: CANCELLED | OUTPATIENT
Start: 2020-11-03

## 2020-11-03 RX ORDER — HEPARIN SODIUM (PORCINE) LOCK FLUSH IV SOLN 100 UNIT/ML 100 UNIT/ML
5 SOLUTION INTRAVENOUS
Status: CANCELLED | OUTPATIENT
Start: 2020-11-03

## 2020-11-03 RX ORDER — HEPARIN SODIUM,PORCINE 10 UNIT/ML
5 VIAL (ML) INTRAVENOUS
Status: CANCELLED | OUTPATIENT
Start: 2020-11-03

## 2020-11-03 RX ORDER — HYDRALAZINE HYDROCHLORIDE 20 MG/ML
10 INJECTION INTRAMUSCULAR; INTRAVENOUS
Status: CANCELLED | OUTPATIENT
Start: 2020-11-03

## 2020-11-03 RX ADMIN — SODIUM CHLORIDE 27.5 MG: 9 INJECTION, SOLUTION INTRAVENOUS at 14:30

## 2020-11-03 RX ADMIN — SODIUM CHLORIDE 250 ML: 9 INJECTION, SOLUTION INTRAVENOUS at 14:29

## 2020-11-03 NOTE — LETTER
"    11/3/2020         RE: Yoana Webber  [address] [place] [address]        Dear Colleague,    Thank you for referring your patient, Yoana Webber, to the [facility] TREATMENT [facility]. Please see a copy of my visit note below.    Nursing Note  Yoana Webber presents [facility] for:   Chief Complaint   Patient presents with     Infusion     KETAMINE     During today's [facility] appointment, orders from Dr. Fregoso were completed.  Frequency:  Weekly  Yoana noted that she \"felt nothing\" during or after last infusion.  Patient advised that this writer hears this from other patients occassionally.    Progress note:  Patient identification verified by name and date of birth.  Assessment completed.  Vitals recorded in Doc Flowsheets.  Patient was provided with education regarding medication/procedure and possible side effects.  Patient verbalized understanding.     present during visit today: Not Applicable.    Treatment Conditions: Denies active plan to harm herself    Premedications: were not ordered.    Drug Waste Record: No    Infusion length and rate: Ketamine infusion given over approximately 40 minutes, followed by 60 minutes observation.    Labs: were not ordered for this appointment.    Vascular access: peripheral IV placed today.    Post Infusion Assessment:  Patient tolerated infusion without incident.  Blood return noted pre and post infusion.  Site patent and intact, free from redness, edema or discomfort.  No evidence of extravasations.  Access discontinued per protocol.     Discharge Plan:   Follow up plan of care with: ongoing infusions at [facility] ordering provider as scheduled. and after visit summary declined by patient  Discharge instructions were reviewed with patient.  Patient/representative verbalized understanding of discharge instructions and all " questions answered.  Patient discharged from [facility] in stable condition.    Elizabeth Uriarte    Administrations This Visit     0.9% sodium chloride BOLUS     Admin Date  11/03/2020 Action  New Bag Dose  250 mL Rate  1,000 mL/hr Route  Intravenous Administered By  Elizabeth Uriarte          ketamine (KETALAR) 0.5 mg/kg = 27.5 mg in sodium chloride 0.9 % 50.55 mL intermittent infusion     Admin Date  11/03/2020 Action  New Bag Dose  27.5 mg Rate  75.8 mL/hr Route  Intravenous Administered By  Elizabeth Uriarte                /84   Pulse 95   Temp 98.8  F (37.1  C)   Wt 94.8 kg (208 lb 15.9 oz)   SpO2 98%   BMI 35.87 kg/m            Again, thank you for allowing me to participate in the care of your patient.        Sincerely,        [facility]

## 2020-11-03 NOTE — PROGRESS NOTES
"Nursing Note  Yoana Webber presents today to [facility] for:   Chief Complaint   Patient presents with     Infusion     KETAMINE     During today's [facility] appointment, orders from Dr. Fregoso were completed.  Frequency:  Weekly  Yoana noted that she \"felt nothing\" during or after last infusion.  Patient advised that this writer hears this from other patients occassionally.    Progress note:  Patient identification verified by name and date of birth.  Assessment completed.  Vitals recorded in Doc Flowsheets.  Patient was provided with education regarding medication/procedure and possible side effects.  Patient verbalized understanding.     present during visit today: Not Applicable.    Treatment Conditions: Denies active plan to harm herself    Premedications: were not ordered.    Drug Waste Record: No    Infusion length and rate: Ketamine infusion given over approximately 40 minutes, followed by 60 minutes observation.    Labs: were not ordered for this appointment.    Vascular access: peripheral IV placed today.    Post Infusion Assessment:  Patient tolerated infusion without incident.  Blood return noted pre and post infusion.  Site patent and intact, free from redness, edema or discomfort.  No evidence of extravasations.  Access discontinued per protocol.     Discharge Plan:   Follow up plan of care with: ongoing infusions at [facility]., ordering provider as scheduled. and after visit summary declined by patient  Discharge instructions were reviewed with patient.  Patient/representative verbalized understanding of discharge instructions and all questions answered.  Patient discharged from [facility] in stable condition.    Elizabeth Uriarte    Administrations This Visit     0.9% sodium chloride BOLUS     Admin Date  11/03/2020 Action  New Bag Dose  250 mL Rate  1,000 mL/hr " Route  Intravenous Administered By  Elizabeth Uriarte          ketamine (KETALAR) 0.5 mg/kg = 27.5 mg in sodium chloride 0.9 % 50.55 mL intermittent infusion     Admin Date  11/03/2020 Action  New Bag Dose  27.5 mg Rate  75.8 mL/hr Route  Intravenous Administered By  Elizabeth Uriarte                /84   Pulse 95   Temp 98.8  F (37.1  C)   Wt 94.8 kg (208 lb 15.9 oz)   SpO2 98%   BMI 35.87 kg/m

## 2020-11-10 ENCOUNTER — TELEPHONE (OUTPATIENT)
Dept: PSYCHIATRY | Facility: CLINIC | Age: 20
End: 2020-11-10

## 2020-11-10 RX ORDER — ONDANSETRON 2 MG/ML
4 INJECTION INTRAMUSCULAR; INTRAVENOUS
Status: CANCELLED | OUTPATIENT
Start: 2020-11-10

## 2020-11-10 RX ORDER — ONDANSETRON 2 MG/ML
4 INJECTION INTRAMUSCULAR; INTRAVENOUS
Status: DISCONTINUED | OUTPATIENT
Start: 2020-11-10 | End: 2021-07-26

## 2020-11-10 RX ORDER — HEPARIN SODIUM,PORCINE 10 UNIT/ML
5 VIAL (ML) INTRAVENOUS
Status: DISCONTINUED | OUTPATIENT
Start: 2020-11-10 | End: 2021-07-26

## 2020-11-10 RX ORDER — HEPARIN SODIUM,PORCINE 10 UNIT/ML
5 VIAL (ML) INTRAVENOUS
Status: CANCELLED | OUTPATIENT
Start: 2020-11-10

## 2020-11-10 RX ORDER — HEPARIN SODIUM (PORCINE) LOCK FLUSH IV SOLN 100 UNIT/ML 100 UNIT/ML
5 SOLUTION INTRAVENOUS
Status: DISCONTINUED | OUTPATIENT
Start: 2020-11-10 | End: 2021-07-26

## 2020-11-10 RX ORDER — HYDRALAZINE HYDROCHLORIDE 20 MG/ML
10 INJECTION INTRAMUSCULAR; INTRAVENOUS
Status: ACTIVE | OUTPATIENT
Start: 2020-11-10

## 2020-11-10 RX ORDER — HYDRALAZINE HYDROCHLORIDE 20 MG/ML
10 INJECTION INTRAMUSCULAR; INTRAVENOUS
Status: CANCELLED | OUTPATIENT
Start: 2020-11-10

## 2020-11-10 RX ORDER — HEPARIN SODIUM (PORCINE) LOCK FLUSH IV SOLN 100 UNIT/ML 100 UNIT/ML
5 SOLUTION INTRAVENOUS
Status: CANCELLED | OUTPATIENT
Start: 2020-11-10

## 2020-11-10 NOTE — TELEPHONE ENCOUNTER
Pt is headed to [facility] for their Ketamine infusion and they do not have the order on file from Dr. Christine Olmos at [facility] called from [phone]. Their appt is at 2pm today.

## 2020-11-10 NOTE — TELEPHONE ENCOUNTER
Writer return call to patient. Writer informed patient that ketamine was spaced out strategically and ideal weight is often used if patients aren't in the ideal weight category.    Writer offered to set up appointment with Dr. Fregoso if patient had more questions and needed more details. Patient refused at this time and will call back once patient's schedule is figured out.

## 2020-11-10 NOTE — TELEPHONE ENCOUNTER
What is the concern that needs to be addressed by a nurse? Patient called and had two questions about her ketamine treatments. She was wondering why the dosing was set using her ideal weight. And why she is now being tapered off?    May a detailed message be left on voicemail?    Date of last office visit: 11/3/20    Message routed to: Psychiatry RN pool

## 2021-03-17 ENCOUNTER — TELEPHONE (OUTPATIENT)
Dept: PSYCHIATRY | Facility: CLINIC | Age: 21
End: 2021-03-17

## 2021-03-17 DIAGNOSIS — F33.2 SEVERE RECURRENT MAJOR DEPRESSION WITHOUT PSYCHOTIC FEATURES (H): Primary | ICD-10-CM

## 2021-03-17 NOTE — TELEPHONE ENCOUNTER
Received approval for VNS.  Called patient to discuss.  Received voicemail, left message asking for a return call.  Clinic number provided.

## 2021-03-17 NOTE — TELEPHONE ENCOUNTER
Writer received call back from patient.  Let her know that VNS was approved.  Placed Neurosurgery referral and gave her scheduling number.  She will call today to schedule this.

## 2021-03-19 ENCOUNTER — PRE VISIT (OUTPATIENT)
Dept: NEUROSURGERY | Facility: CLINIC | Age: 21
End: 2021-03-19

## 2021-03-23 ENCOUNTER — OFFICE VISIT (OUTPATIENT)
Dept: NEUROSURGERY | Facility: CLINIC | Age: 21
End: 2021-03-23
Attending: PSYCHIATRY & NEUROLOGY
Payer: COMMERCIAL

## 2021-03-23 VITALS
HEIGHT: 64 IN | RESPIRATION RATE: 16 BRPM | OXYGEN SATURATION: 99 % | HEART RATE: 93 BPM | BODY MASS INDEX: 35.87 KG/M2 | DIASTOLIC BLOOD PRESSURE: 87 MMHG | SYSTOLIC BLOOD PRESSURE: 124 MMHG

## 2021-03-23 DIAGNOSIS — F33.2 SEVERE EPISODE OF RECURRENT MAJOR DEPRESSIVE DISORDER, WITHOUT PSYCHOTIC FEATURES (H): Primary | ICD-10-CM

## 2021-03-23 PROCEDURE — 99204 OFFICE O/P NEW MOD 45 MIN: CPT | Performed by: NEUROLOGICAL SURGERY

## 2021-03-23 RX ORDER — ATOMOXETINE 60 MG/1
60 CAPSULE ORAL EVERY MORNING
COMMUNITY
Start: 2021-02-24 | End: 2022-02-24

## 2021-03-23 RX ORDER — MULTIVITAMIN,THER AND MINERALS
1 TABLET ORAL DAILY
COMMUNITY
End: 2021-12-20

## 2021-03-23 ASSESSMENT — PAIN SCALES - GENERAL: PAINLEVEL: NO PAIN (0)

## 2021-03-23 NOTE — NURSING NOTE
Chief Complaint   Patient presents with     Consult     [facility] New - Sever recurrent major depression without psychotic features     Anjum Liz

## 2021-03-23 NOTE — LETTER
3/23/2021       RE: Yoana Webber  [address] [place] [id number]     Dear Colleague,    Thank you for referring your patient, Yoana Webber, to the [facility] [place] at [facility]. Please see a copy of my visit note below.    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  She tells me that she has had an extensive history of mostly ineffective therapies and is now on a somewhat successful weekly dose ketamine treatment.  She has had refractory depression for almost 10 years now after significant trauma and is ready for any treatment that will even marginally improve her situation.  She has undergone permutations of an extensive number of medications without success.  She has been hospitalized as recently as September with a plan for suicide and feels that she is holding on in the hopes for an improved treatment.  She has spoken with Dr. Fregoso about VNS therapy and is familiar with the surgery from what she can find online.    She has undergone multiple surgeries in the past without complications.  She has no history of infection, rashes, sores or inability to heal.        Past Medical History:   Diagnosis Date     Anxiety      Depressive disorder      OCD (obsessive compulsive disorder)      PTSD (post-traumatic stress disorder)        Past Surgical History:   Procedure Laterality Date     CHOLECYSTECTOMY       ENT SURGERY      tonsillectomy       No family history on file.    Social History     Socioeconomic History     Marital status: Single      Spouse name: Not on file     Number of children: Not on file     Years of education: Not on file     Highest education level: Not on file   Occupational History     Not on file   Social Needs     Financial resource strain: Not on file     Food insecurity     Worry: Not on file     Inability: Not on file     Transportation needs     Medical: Not on file     Non-medical: Not on file   Tobacco Use     Smoking status: Never Smoker     Smokeless tobacco: Never Used   Substance and Sexual Activity     Alcohol use: Yes     Frequency: Never     Comment: drinks socially with parents on weekends     Drug use: Yes     Types: Marijuana     Sexual activity: Not Currently   Lifestyle     Physical activity     Days per week: Not on file     Minutes per session: Not on file     Stress: Not on file   Relationships     Social connections     Talks on phone: Not on file     Gets together: Not on file     Attends [employer] service: Not on file     Active member of club or organization: Not on file     Attends meetings of clubs or organizations: Not on file     Relationship status: Not on file     Intimate partner violence     Fear of current or ex partner: Not on file     Emotionally abused: Not on file     Physically abused: Not on file     Forced sexual activity: Not on file   Other Topics Concern     Not on file   Social History Narrative     Not on file          Allergies   Allergen Reactions     Hydrocodone-Acetaminophen Other (See Comments), Nausea and Vomiting and Unknown     Severe hallucinations. Patient reports hallucinations        Latex Hives     Codeine Other (See Comments), Nausea and Vomiting and Unknown     Pt reports having hallucinations.  Pt reports tolerating Tylenol         Current Outpatient Medications   Medication     albuterol (PROAIR HFA/PROVENTIL HFA/VENTOLIN HFA) 108 (90 Base) MCG/ACT inhaler     ARIPiprazole (ABILIFY) 5 MG tablet     atomoxetine (STRATTERA) 60 MG capsule     Calcium-Vitamin D-Vitamin K  "500-100-40 MG-UNT-MCG CHEW     clobetasol (TEMOVATE) 0.05 % CREA cream     clonazePAM (KLONOPIN) 1 MG tablet     desvenlafaxine (PRISTIQ) 100 MG 24 hr tablet     ibuprofen (ADVIL/MOTRIN) 200 MG tablet     levonorgestrel-ethinyl estradiol (AVIANE) 0.1-20 MG-MCG tablet     memantine (NAMENDA) 5 MG tablet     montelukast (SINGULAIR) 10 MG tablet     ondansetron (ZOFRAN-ODT) 8 MG ODT tab     polyethylene glycol (MIRALAX) 17 GM/Dose powder     prazosin (MINIPRESS) 2 MG capsule     traZODone (DESYREL) 100 MG tablet     vitamin  s/Minerals TABS     ziprasidone (GEODON) 60 MG capsule     folic acid (FOLVITE) 1 MG tablet     LEVONORGESTREL-ETHINYL ESTRAD PO     mirtazapine (REMERON) 15 MG tablet     ziprasidone (GEODON) 60 MG capsule     No current facility-administered medications for this visit.      Facility-Administered Medications Ordered in Other Visits   Medication     heparin 100 UNIT/ML injection 5 mL     heparin lock flush 10 UNIT/ML injection 5 mL     hydrALAZINE (APRESOLINE) injection 10 mg     INFUSION HYPERSENSITIVITY     ketamine (KETALAR) 0.5 mg/kg = 27.5 mg in sodium chloride 0.9 % 50.55 mL intermittent infusion     ondansetron (ZOFRAN) injection 4 mg     sodium chloride (PF) 0.9% PF flush 3-20 mL             Physical Exam  /87   Pulse 93   Resp 16   Ht 1.626 m (5' 4\")   SpO2 99%   BMI 35.87 kg/m        General: Awake, alert, oriented. Well nourished, well developed, no acute distress.  HEENT: Head normocephalic, atraumatic.   Heart: Regular rhythm and rate. No murmurs.  Lungs: Clear to auscultation and percussion bilaterally. No rhonchi, rales or wheeze.  Abdomen: Soft, non-tender, non-distended. No hepatosplenomegaly.  Extremity: Warm with no clubbing or cyanosis. No lower extremity edema.    Neurological  Awake, alert and oriented to date, time, place and person. Speech fluent.   Pupils equal, round, reactive to light.  Extraocular movement intact.  Visual fields are full on " confrontation.  Hearing is grossly normal to finger rub.   Facial sensation intact.  Face symmetric.  Tongue midline.  Uvula elevates equally.    Motor: full strength throughout.  Sensation: intact to light touch and pinpoint.      ROS: 10 point ROS neg other than the symptoms noted above in the HPI.          Assessment and Plan   Yoana Webber is a 21 year old female with severe treatment refractory depression referred for consideration of vagal nerve stimulator implant.  We discussed the surgical procedure including an extensive discussion of the potential risks, potential benefits, and alternatives.  We discussed the acute and chronic risks of VNS therapy including infection, hematoma, hemorrhage, postoperative hematoma including its effect on the airway and possibility of intubation.  We discussed the risk of anesthesia and the risk of failure of therapy for depression.  We discussed the overall literature including the responder rate to VNS therapy for treatment refractory depression.  We discussed the challenges with insurance and the ongoing clinical trials.   She and her parents actively participated in the discussion and would like to move forward with surgery.  I will [place] back with Dr. Fregoso.    - PAC visit  - Left VNS implant    Andres Martinez MD  Department of Neurosurgery  [facility]

## 2021-04-01 ENCOUNTER — TELEPHONE (OUTPATIENT)
Dept: PSYCHIATRY | Facility: CLINIC | Age: 21
End: 2021-04-01

## 2021-04-01 NOTE — TELEPHONE ENCOUNTER
Pt called, their insurance has accepted VNS treatment. She met w the surgeon and they suggested to contact Dr. Fregoso to ask if she would be a part of the study or just receiving tx. Pt is also wondering how long her insurance will cover tx, would the clinic know or does she need to contact her insurance. Overall, please call back to discuss next steps.

## 2021-04-02 NOTE — TELEPHONE ENCOUNTER
Writer called patient and updated her that she is NOT part of the Recover trial.  Also updated her that we are working on getting auth letter to find out length of auth.  Once we have this, we will get it sent to neurosurgery so they know when patient needs to be scheduled.     Patient verbalized understanding of this.

## 2021-04-26 ENCOUNTER — TELEPHONE (OUTPATIENT)
Dept: NEUROSURGERY | Facility: CLINIC | Age: 21
End: 2021-04-26

## 2021-04-26 NOTE — TELEPHONE ENCOUNTER
Health Call Center    Phone Message    May a detailed message be left on voicemail: yes     Reason for Call: Order(s): Other:   Reason for requested: Patient is calling stating she is ready to schedule her surgery. Please call once surgery order is available for patient to schedule.  Date needed: asap  Provider name: Dr. Martinez      Action Taken: Message routed to:  [facility] NE[facility]    Travel Screening: Not Applicable

## 2021-04-26 NOTE — TELEPHONE ENCOUNTER
Routed to Neurosurgery Nurse Jose Barrios,RNCC for Dr. Martinez.   Patient requesting to schedule surgery     Suha Chris LPN  Neurosurgery

## 2021-05-18 ENCOUNTER — TELEPHONE (OUTPATIENT)
Dept: NEUROSURGERY | Facility: CLINIC | Age: 21
End: 2021-05-18

## 2021-05-18 DIAGNOSIS — Z11.59 ENCOUNTER FOR SCREENING FOR OTHER VIRAL DISEASES: ICD-10-CM

## 2021-05-18 NOTE — PROGRESS NOTES
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  She tells me that she has had an extensive history of mostly ineffective therapies and is now on a somewhat successful weekly dose ketamine treatment.  She has had refractory depression for almost 10 years now after significant trauma and is ready for any treatment that will even marginally improve her situation.  She has undergone permutations of an extensive number of medications without success.  She has been hospitalized as recently as September with a plan for suicide and feels that she is holding on in the hopes for an improved treatment.  She has spoken with Dr. Fregoso about VNS therapy and is familiar with the surgery from what she can find online.    She has undergone multiple surgeries in the past without complications.  She has no history of infection, rashes, sores or inability to heal.          Past Medical History:   Diagnosis Date     Anxiety      Depressive disorder      OCD (obsessive compulsive disorder)      PTSD (post-traumatic stress disorder)        Past Surgical History:   Procedure Laterality Date     CHOLECYSTECTOMY       ENT SURGERY      tonsillectomy       No family history on file.    Social History     Socioeconomic History     Marital status: Single     Spouse name: Not on file     Number of children: Not on file     Years of education: Not on file     Highest education level: Not on file   Occupational History     Not on file   Social Needs     Financial resource strain: Not on file     Food insecurity     Worry: Not on file     Inability: Not on file      Transportation needs     Medical: Not on file     Non-medical: Not on file   Tobacco Use     Smoking status: Never Smoker     Smokeless tobacco: Never Used   Substance and Sexual Activity     Alcohol use: Yes     Frequency: Never     Comment: drinks socially with parents on weekends     Drug use: Yes     Types: Marijuana     Sexual activity: Not Currently   Lifestyle     Physical activity     Days per week: Not on file     Minutes per session: Not on file     Stress: Not on file   Relationships     Social connections     Talks on phone: Not on file     Gets together: Not on file     Attends [employer] service: Not on file     Active member of club or organization: Not on file     Attends meetings of clubs or organizations: Not on file     Relationship status: Not on file     Intimate partner violence     Fear of current or ex partner: Not on file     Emotionally abused: Not on file     Physically abused: Not on file     Forced sexual activity: Not on file   Other Topics Concern     Not on file   Social History Narrative     Not on file          Allergies   Allergen Reactions     Hydrocodone-Acetaminophen Other (See Comments), Nausea and Vomiting and Unknown     Severe hallucinations. Patient reports hallucinations        Latex Hives     Codeine Other (See Comments), Nausea and Vomiting and Unknown     Pt reports having hallucinations.  Pt reports tolerating Tylenol         Current Outpatient Medications   Medication     albuterol (PROAIR HFA/PROVENTIL HFA/VENTOLIN HFA) 108 (90 Base) MCG/ACT inhaler     ARIPiprazole (ABILIFY) 5 MG tablet     atomoxetine (STRATTERA) 60 MG capsule     Calcium-Vitamin D-Vitamin K 500-100-40 MG-UNT-MCG CHEW     clobetasol (TEMOVATE) 0.05 % CREA cream     clonazePAM (KLONOPIN) 1 MG tablet     desvenlafaxine (PRISTIQ) 100 MG 24 hr tablet     ibuprofen (ADVIL/MOTRIN) 200 MG tablet     levonorgestrel-ethinyl estradiol (AVIANE) 0.1-20 MG-MCG tablet     memantine (NAMENDA) 5 MG tablet      "montelukast (SINGULAIR) 10 MG tablet     ondansetron (ZOFRAN-ODT) 8 MG ODT tab     polyethylene glycol (MIRALAX) 17 GM/Dose powder     prazosin (MINIPRESS) 2 MG capsule     traZODone (DESYREL) 100 MG tablet     vitamin  s/Minerals TABS     ziprasidone (GEODON) 60 MG capsule     folic acid (FOLVITE) 1 MG tablet     LEVONORGESTREL-ETHINYL ESTRAD PO     mirtazapine (REMERON) 15 MG tablet     ziprasidone (GEODON) 60 MG capsule     No current facility-administered medications for this visit.      Facility-Administered Medications Ordered in Other Visits   Medication     heparin 100 UNIT/ML injection 5 mL     heparin lock flush 10 UNIT/ML injection 5 mL     hydrALAZINE (APRESOLINE) injection 10 mg     INFUSION HYPERSENSITIVITY     ketamine (KETALAR) 0.5 mg/kg = 27.5 mg in sodium chloride 0.9 % 50.55 mL intermittent infusion     ondansetron (ZOFRAN) injection 4 mg     sodium chloride (PF) 0.9% PF flush 3-20 mL             Physical Exam  /87   Pulse 93   Resp 16   Ht 1.626 m (5' 4\")   SpO2 99%   BMI 35.87 kg/m        General: Awake, alert, oriented. Well nourished, well developed, no acute distress.  HEENT: Head normocephalic, atraumatic.   Heart: Regular rhythm and rate. No murmurs.  Lungs: Clear to auscultation and percussion bilaterally. No rhonchi, rales or wheeze.  Abdomen: Soft, non-tender, non-distended. No hepatosplenomegaly.  Extremity: Warm with no clubbing or cyanosis. No lower extremity edema.    Neurological  Awake, alert and oriented to date, time, place and person. Speech fluent.   Pupils equal, round, reactive to light.  Extraocular movement intact.  Visual fields are full on confrontation.  Hearing is grossly normal to finger rub.   Facial sensation intact.  Face symmetric.  Tongue midline.  Uvula elevates equally.    Motor: full strength throughout.  Sensation: intact to light touch and pinpoint.      ROS: 10 point ROS neg other than the symptoms noted above in the HPI.          Assessment and " Plan   Yoaan Webber is a 21 year old female with severe treatment refractory depression referred for consideration of vagal nerve stimulator implant.  We discussed the surgical procedure including an extensive discussion of the potential risks, potential benefits, and alternatives.  We discussed the acute and chronic risks of VNS therapy including infection, hematoma, hemorrhage, postoperative hematoma including its effect on the airway and possibility of intubation.  We discussed the risk of anesthesia and the risk of failure of therapy for depression.  We discussed the overall literature including the responder rate to VNS therapy for treatment refractory depression.  We discussed the challenges with insurance and the ongoing clinical trials.   She and her parents actively participated in the discussion and would like to move forward with surgery.  I will [place] back with Dr. Fregoso.    - PAC visit  - Left VNS implant    Andres Martinez MD  Department of Neurosurgery  [facility]

## 2021-05-19 NOTE — TELEPHONE ENCOUNTER
FUTURE VISIT INFORMATION      SURGERY INFORMATION:    Date: 6/11/21    Location: [facility] OR    Surgeon:  Andres Martinez MD    Anesthesia Type:  General    Procedure: Vagus nerve stimulator placement with placement of generator/battery over left chest wall    Consult: ov 3/23    RECORDS REQUESTED FROM:       Primary Care Provider: Petty aAron MD- [facility]    Most recent EKG+ Tracing: 10/12/20- [facility]    Most recent ECHO: 5/8/17- [facility]

## 2021-05-24 ENCOUNTER — DOCUMENTATION ONLY (OUTPATIENT)
Dept: NEUROSURGERY | Facility: CLINIC | Age: 21
End: 2021-05-24

## 2021-06-03 ENCOUNTER — NURSE TRIAGE (OUTPATIENT)
Dept: NURSING | Facility: CLINIC | Age: 21
End: 2021-06-03

## 2021-06-03 NOTE — TELEPHONE ENCOUNTER
"Pt has upcoming covid test 6/8/21 as pre-procedure protocol.  States \"recently rec'd covid immunization.\"  Asks \"Does covid vaccine impact covid test results?\"  Answer -> No.  Vaccine causes antibody production which is the goal.  Antibodies are detectable only by blood test.  PCR molecular nasal swab for covid testing is for purpose of detecting symptoms of illness only.  Pt verbalizes understanding.  No symptoms of illness requiring triage at this time.  No further questions.    Indir[facility] Nurse Advisor     Reason for Disposition    COVID-19 vaccine, Frequently Asked Questions (FAQs)    Protocols used: CORONAVIRUS (COVID-19) VACCINE QUESTIONS AND HVUCSRRSN-F-GM 3.25    __________________________    COVID 19 Nurse Triage Plan/Patient Instructions    Please be aware that novel coronavirus (COVID-19) may be circulating in the community. If you develop symptoms such as fever, cough, or SOB or if you have concerns about the presence of another infection including coronavirus (COVID-19), please contact your health care provider or visit [email or web address].     Disposition/Instructions    Additional COVID19 information to add for patients.   How can I protect others?  If you have symptoms (fever, cough, body aches or trouble breathing): Stay home and away from others (self-isolate) until:    At least 10 days have passed since your symptoms started, And     You ve had no fever--and no medicine that reduces fever--for 1 full day (24 hours), And      Your other symptoms have resolved (gotten better).     If you don t have symptoms, but a test showed that you have COVID-19 (you tested positive):    Stay home and away from others (self-isolate). Follow the tips under \"How do I self-isolate?\" below for 10 days (20 days if you have a weak immune system).    You don't need to be retested for COVID-19 before going back to school or work. As long as you're fever-free and feeling better, you can go back to " school, work and other activities after waiting the 10 or 20 days.     How do I self-isolate?    Stay in your own room, even for meals. Use your own bathroom if you can.     Stay away from others in your home. No hugging, kissing or shaking hands. No visitors.    Don t go to work, school or anywhere else.     Clean  high touch  surfaces often (doorknobs, counters, handles, etc.). Use a household cleaning spray or wipes. You ll find a full list on the EPA website:  www.epa.gov/pesticide-registration/list-n-disinfectants-use-against-sars-cov-2.    Cover your mouth and nose with a mask, tissue or washcloth to avoid spreading germs.    Wash your hands and face often. Use soap and water.    Caregivers in these groups are at risk for severe illness due to COVID-19:  o People 65 years and older  o People who live in a nursing home or long-term care facility  o People with chronic disease (lung, heart, cancer, diabetes, kidney, liver, immunologic)  o People who have a weakened immune system, including those who:  - Are in cancer treatment  - Take medicine that weakens the immune system, such as corticosteroids  - Had a bone marrow or organ transplant  - Have an immune deficiency  - Have poorly controlled HIV or AIDS  - Are obese (body mass index of 40 or higher)  - Smoke regularly    Caregivers should wear gloves while washing dishes, handling laundry and cleaning bedrooms and bathrooms.    Use caution when washing and drying laundry: Don t shake dirty laundry, and use the warmest water setting that you can.    For more tips, go to www.cdc.gov/coronavirus/2019-ncov/downloads/10Things.pdf.    How can I take care of myself?  1. Get lots of rest. Drink extra fluids (unless a doctor has told you not to).     2. Take Tylenol (acetaminophen) for fever or pain. If you have liver or kidney problems, ask your family doctor if it s okay to take Tylenol.     Adults can take either:     650 mg (two 325 mg pills) every 4 to 6 hours,  or     1,000 mg (two 500 mg pills) every 8 hours as needed.     Note: Don t take more than 3,000 mg in one day.   Acetaminophen is found in many medicines (both prescribed and over-the-counter medicines). Read all labels to be sure you don t take too much.     For children, check the Tylenol bottle for the right dose. The dose is based on the child s age or weight.    3. If you have other health problems (like cancer, heart failure, an organ transplant or severe kidney disease): Call your specialty clinic if you don t feel better in the next 2 days.    4. Know when to call 911: Emergency warning signs include:    Trouble breathing or shortness of breath    Pain or pressure in the chest that doesn t go away    Feeling confused like you haven t felt before, or not being able to wake up    Bluish-colored lips or face    What are the symptoms of COVID-19?     The most common symptoms are cough, fever and trouble breathing.     Less common symptoms include body aches, chills, diarrhea (loose, watery poops), fatigue (feeling very tired), headache, runny nose, sore throat and loss of smell.    COVID-19 can cause severe coughing (bronchitis) and lung infection (pneumonia).    How does it spread?     The virus may spread when a person coughs or sneezes into the air. The virus can travel about 6 feet this way, and it can live on surfaces.      Common  (household disinfectants) will kill the virus.    Who is at risk?  Anyone can catch COVID-19 if they re around someone who has the virus.    How can others protect themselves?     Stay away from people who have COVID-19 (or symptoms of COVID-19).    Wash hands often with soap and water. Or, use hand  with at least 60% alcohol.    Avoid touching the eyes, nose or mouth.     Wear a face mask when you go out in public, when sick or when caring for a sick person.    Where can I get more information?     [facility] [place]: About COVID-19:  [email or web address]    CDC: What to Do If You re Sick: www.cdc.gov/coronavirus/2019-ncov/about/steps-when-sick.html    CDC: Ending Home Isolation: www.cdc.gov/coronavirus/2019-ncov/hcp/disposition-in-home-patients.html     CDC: Caring for Someone: www.cdc.gov/coronavirus/2019-ncov/if-you-are-sick/care-for-someone.html     [facility]: Interim Guidance for Hospital Discharge to Home: www.[facility]/diseases/coronavirus/hcp/hospdischarge.pdf    [facility] clinical trials (COVID-19 research studies): [email or web address].[facility]-clinical-trials     Below are the COVID-19 hotlines at the [place] [facility] ([facility]). Interpreters are available.   o For health questions: Call 097-456-3592 or 1-121.861.7172 (7 a.m. to 7 p.m.)  o For questions about schools and childcare: Call 784-465-6211 or 1-726.558.2343 (7 a.m. to 7 p.m.)          Thank you for taking steps to prevent the spread of this virus.  o Limit your contact with others.  o Wear a simple mask to cover your cough.  o Wash your hands well and often.    Resources    [facility]: About COVID-19: [email or web address]    CDC: What to Do If You're Sick: www.cdc.gov/coronavirus/2019-ncov/about/steps-when-sick.html    CDC: Ending Home Isolation: www.cdc.gov/coronavirus/2019-ncov/hcp/disposition-in-home-patients.html     CDC: Caring for Someone: www.cdc.gov/coronavirus/2019-ncov/if-you-are-sick/care-for-someone.html     [facility]: Interim Guidance for Hospital Discharge to Home: www.[facility]/diseases/coronavirus/hcp/hospdischarge.pdf    [facility] clinical trials (COVID-19 research studies): [email or web address].[facility]/umn-clinical-trials     Below are the COVID-19 hotlines at the [place] [facility] ([facility]). Interpreters are available.   o For health questions: Call 809-671-2021 or 1-226.902.9992 (7 a.m. to 7 p.m.)  o For questions about schools and childcare: Call 324-678-5045 or 1-413.141.9226 (0  a.m. to 7 p.m.)

## 2021-06-08 ENCOUNTER — OFFICE VISIT (OUTPATIENT)
Dept: SURGERY | Facility: CLINIC | Age: 21
End: 2021-06-08
Payer: COMMERCIAL

## 2021-06-08 ENCOUNTER — PRE VISIT (OUTPATIENT)
Dept: SURGERY | Facility: CLINIC | Age: 21
End: 2021-06-08

## 2021-06-08 ENCOUNTER — ANESTHESIA EVENT (OUTPATIENT)
Dept: SURGERY | Facility: CLINIC | Age: 21
End: 2021-06-08
Payer: COMMERCIAL

## 2021-06-08 VITALS
BODY MASS INDEX: 30.13 KG/M2 | SYSTOLIC BLOOD PRESSURE: 113 MMHG | RESPIRATION RATE: 20 BRPM | HEIGHT: 64 IN | WEIGHT: 176.5 LBS | DIASTOLIC BLOOD PRESSURE: 83 MMHG | OXYGEN SATURATION: 98 % | TEMPERATURE: 98.2 F | HEART RATE: 116 BPM

## 2021-06-08 DIAGNOSIS — Z11.59 ENCOUNTER FOR SCREENING FOR OTHER VIRAL DISEASES: ICD-10-CM

## 2021-06-08 DIAGNOSIS — Z01.818 PRE-OP EVALUATION: ICD-10-CM

## 2021-06-08 DIAGNOSIS — Z01.818 PRE-OP EVALUATION: Primary | ICD-10-CM

## 2021-06-08 LAB
ERYTHROCYTE [DISTWIDTH] IN BLOOD BY AUTOMATED COUNT: 13 % (ref 10–15)
HCT VFR BLD AUTO: 42.5 % (ref 35–47)
HGB BLD-MCNC: 13.9 G/DL (ref 11.7–15.7)
LABORATORY COMMENT REPORT: NORMAL
MCH RBC QN AUTO: 31.1 PG (ref 26.5–33)
MCHC RBC AUTO-ENTMCNC: 32.7 G/DL (ref 31.5–36.5)
MCV RBC AUTO: 95 FL (ref 78–100)
PLATELET # BLD AUTO: 188 10E9/L (ref 150–450)
POTASSIUM SERPL-SCNC: 3.3 MMOL/L (ref 3.4–5.3)
RBC # BLD AUTO: 4.47 10E12/L (ref 3.8–5.2)
SARS-COV-2 RNA RESP QL NAA+PROBE: NEGATIVE
SARS-COV-2 RNA RESP QL NAA+PROBE: NORMAL
SPECIMEN SOURCE: NORMAL
SPECIMEN SOURCE: NORMAL
WBC # BLD AUTO: 6.2 10E9/L (ref 4–11)

## 2021-06-08 PROCEDURE — U0005 INFEC AGEN DETEC AMPLI PROBE: HCPCS | Mod: 90 | Performed by: PATHOLOGY

## 2021-06-08 PROCEDURE — 36415 COLL VENOUS BLD VENIPUNCTURE: CPT | Performed by: PATHOLOGY

## 2021-06-08 PROCEDURE — 84132 ASSAY OF SERUM POTASSIUM: CPT | Performed by: PATHOLOGY

## 2021-06-08 PROCEDURE — U0003 INFECTIOUS AGENT DETECTION BY NUCLEIC ACID (DNA OR RNA); SEVERE ACUTE RESPIRATORY SYNDROME CORONAVIRUS 2 (SARS-COV-2) (CORONAVIRUS DISEASE [COVID-19]), AMPLIFIED PROBE TECHNIQUE, MAKING USE OF HIGH THROUGHPUT TECHNOLOGIES AS DESCRIBED BY CMS-2020-01-R: HCPCS | Mod: 90 | Performed by: PATHOLOGY

## 2021-06-08 PROCEDURE — 85027 COMPLETE CBC AUTOMATED: CPT | Performed by: PATHOLOGY

## 2021-06-08 PROCEDURE — 99204 OFFICE O/P NEW MOD 45 MIN: CPT | Performed by: PHYSICIAN ASSISTANT

## 2021-06-08 SDOH — HEALTH STABILITY: MENTAL HEALTH: HOW OFTEN DO YOU HAVE 6 OR MORE DRINKS ON ONE OCCASION?: NOT ASKED

## 2021-06-08 SDOH — HEALTH STABILITY: MENTAL HEALTH: HOW MANY STANDARD DRINKS CONTAINING ALCOHOL DO YOU HAVE ON A TYPICAL DAY?: NOT ASKED

## 2021-06-08 ASSESSMENT — PAIN SCALES - GENERAL: PAINLEVEL: NO PAIN (0)

## 2021-06-08 ASSESSMENT — MIFFLIN-ST. JEOR: SCORE: 1540.6

## 2021-06-08 ASSESSMENT — LIFESTYLE VARIABLES: TOBACCO_USE: 0

## 2021-06-08 ASSESSMENT — PATIENT HEALTH QUESTIONNAIRE - PHQ9: SUM OF ALL RESPONSES TO PHQ QUESTIONS 1-9: 21

## 2021-06-08 NOTE — H&P (VIEW-ONLY)
Pre-Operative H & P     CC:  Preoperative exam to assess for increased cardiopulmonary risk while undergoing surgery and anesthesia.    Date of Encounter: 6/8/2021  Primary Care Physician:  Ann, [facility]  associated diagnosis: depression    HPI  Yoana Webber is a 21 year old female who presents for pre-operative H & P in preparation for Vagus nerve stimulator placement with placement of generator/battery over left chest wall with Dr. collazo on 6/11/21 at [facility]. Patient is being evaluated for comorbid conditions of anxiety, OCD, PTSD      Ms. 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 She additionally has a history of anorexia nervosa, borderline personality disorder, PTSD and anxiety. She follows with psychiatry. She was referred to Dr. Collazo for further evaluation. She currently undergoes ketamine treatment with some improvement in symptoms. She discussed vagal nerve stimulation with Dr. Collazo and is now scheduled for the above procedure.     History is obtained from the patient and chart review.      Past Medical History  Past Medical History:   Diagnosis Date     Anxiety      Depressive disorder      OCD (obsessive compulsive disorder)      PTSD (post-traumatic stress disorder)        Past Surgical History  Past Surgical History:   Procedure Laterality Date     CHOLECYSTECTOMY       ENT SURGERY      tonsillectomy       Hx of Blood transfusions/reactions: denies     Hx of abnormal bleeding or anti-platelet use: denies    Menstrual history: No LMP recorded. (Menstrual status: Birth Control).    Personal or FH with difficulty with Anesthesia:  denies    Prior to Admission Medications  Current Outpatient Medications   Medication Sig Dispense Refill     albuterol (PROAIR HFA/PROVENTIL HFA/VENTOLIN HFA) 108 (90 Base) MCG/ACT inhaler Inhale 2 puffs into the lungs every 4 hours as needed for shortness of  breath / dyspnea or wheezing       ARIPiprazole (ABILIFY) 5 MG tablet Take 5 mg by mouth every morning        atomoxetine (STRATTERA) 60 MG capsule Take 60 mg by mouth every morning        clobetasol (TEMOVATE) 0.05 % CREA cream Apply topically 2 times daily as needed for eczema       desvenlafaxine (PRISTIQ) 100 MG 24 hr tablet Take 1 tablet (100 mg) by mouth At Bedtime (Patient taking differently: Take 100 mg by mouth every morning ) 30 tablet 1     ibuprofen (ADVIL/MOTRIN) 200 MG tablet Take 800 mg by mouth every 6 hours as needed (for neck/back pain)        memantine (NAMENDA) 5 MG tablet Take 1 tablet (5 mg) by mouth 2 times daily (Patient taking differently: Take 10 mg by mouth 2 times daily ) 60 tablet 1     montelukast (SINGULAIR) 10 MG tablet Take 10 mg by mouth At Bedtime        ondansetron (ZOFRAN-ODT) 8 MG ODT tab Take 8 mg by mouth every 8 hours as needed        polyethylene glycol (MIRALAX) 17 GM/Dose powder Take 1 capful by mouth as needed        prazosin (MINIPRESS) 2 MG capsule Take 1 capsule (2 mg) by mouth At Bedtime (Patient taking differently: Take 2 mg by mouth At Bedtime Taking 4 mg at bedtime) 30 capsule 1     traZODone (DESYREL) 100 MG tablet Take 50 mg by mouth At Bedtime        ziprasidone (GEODON) 60 MG capsule Take 60 mg by mouth At Bedtime        Calcium-Vitamin D-Vitamin K 500-100-40 MG-UNT-MCG CHEW        clonazePAM (KLONOPIN) 1 MG tablet Take 1 tablet (1 mg) by mouth 2 times daily as needed for anxiety 60 tablet 0     Levonorgestrel-Ethinyl Estrad (VIENVA PO) Take 1 tablet by mouth At Bedtime       mirtazapine (REMERON) 15 MG tablet Take 1 tablet (15 mg) by mouth At Bedtime (Patient not taking: Reported on 11/3/2020) 30 tablet 1     vitamin  s/Minerals TABS Take 1 tablet by mouth daily          Allergies  Allergies   Allergen Reactions     Hydrocodone-Acetaminophen Other (See Comments), Nausea and Vomiting and Unknown     Severe hallucinations. Patient reports hallucinations         Latex Hives     Codeine Other (See Comments), Nausea and Vomiting and Unknown     Pt reports having hallucinations.  Pt reports tolerating Tylenol         Social History  Social History     Socioeconomic History     Marital status: Single     Spouse name: Not on file     Number of children: Not on file     Years of education: Not on file     Highest education level: Not on file   Occupational History     Not on file   Social Needs     Financial resource strain: Not on file     Food insecurity     Worry: Not on file     Inability: Not on file     Transportation needs     Medical: Not on file     Non-medical: Not on file   Tobacco Use     Smoking status: Never Smoker     Smokeless tobacco: Never Used   Substance and Sexual Activity     Alcohol use: Not Currently     Frequency: Never     Comment: infrequent     Drug use: Not Currently     Types: Marijuana     Sexual activity: Not Currently   Lifestyle     Physical activity     Days per week: Not on file     Minutes per session: Not on file     Stress: Not on file   Relationships     Social connections     Talks on phone: Not on file     Gets together: Not on file     Attends Worship service: Not on file     Active member of club or organization: Not on file     Attends meetings of clubs or organizations: Not on file     Relationship status: Not on file     Intimate partner violence     Fear of current or ex partner: Not on file     Emotionally abused: Not on file     Physically abused: Not on file     Forced sexual activity: Not on file   Other Topics Concern     Not on file   Social History Narrative     Not on file       Family History  Family History   Problem Relation Age of Onset     Anesthesia Reaction No family hx of      Deep Vein Thrombosis (DVT) No family hx of        ROS/MED HX  ENT/Pulmonary:     (+) asthma  (-) tobacco use   Neurologic:  - neg neurologic ROS     Cardiovascular:     (+) -----Previous cardiac testing   Echo: Date: Results:    Stress Test:  "Date: Results:    ECG Reviewed: Date: 10/2020 Results:  Sinus tachycardia, otherwise normal  Cath: Date: Results:   (-) taking anticoagulants/antiplatelets   METS/Exercise Tolerance:     Hematologic:  - neg hematologic  ROS  (-) history of blood transfusion   Musculoskeletal:  - neg musculoskeletal ROS     GI/Hepatic:  - neg GI/hepatic ROS     Renal/Genitourinary:  - neg Renal ROS     Endo:  - neg endo ROS     Psychiatric/Substance Use:     (+) psychiatric history anxiety and depression (PTSD, OCD)     Infectious Disease:  - neg infectious disease ROS     Malignancy:  - neg malignancy ROS     Other:          The complete review of systems is negative other than noted in the HPI or here.   Temp: 98.2  F (36.8  C) Temp src: Oral BP: 113/83 Pulse: 116   Resp: 20 SpO2: 98 %         176 lbs 8 oz  5' 4\"[pt reported[   Body mass index is 30.3 kg/m .       Physical Exam  Constitutional: Awake, alert, cooperative, no apparent distress, and appears stated age.  Eyes: Pupils equal, round and reactive to light, extra ocular muscles intact, sclera clear, conjunctiva normal.  HENT: Normocephalic, oral pharynx with moist mucus membranes, good dentition.   Respiratory: Clear to auscultation bilaterally, no crackles or wheezing.  Cardiovascular: Regular rate and rhythm, normal S1 and S2, and no murmur noted.  Carotids no bruits. No edema. Palpable pulses to radial arteries.   GI: Normal bowel sounds, soft, non-distended, non-tender  Genitourinary:  deferred  Skin: Warm and dry.  No rashes at anticipated surgical site.   Musculoskeletal: Full ROM of neck. There is no redness, warmth, or swelling of the exposed joints. Gross motor strength is normal.    Neurologic: Awake, alert, oriented to name, place and time. Cranial nerves II-XII are grossly intact. Gait is normal.   Neuropsychiatric: Calm, cooperative. Normal affect.     Labs: (personally reviewed)  4/20/21  Sodium 136 - 145 mmol/L 140  [facility] "   Potassium 3.5 - 5.1 mmol/L 3.4Low   [facility]   Chloride 98 - 109 mmol/L 107  [facility]   CO2 20 - 29 mmol/L 19Low   [facility]   Anion Gap 7 - 16 mmol/L 14  [facility]   Calcium 8.4 - 10.4 mg/dL 8.9  [facility]   BUN 7 - 26 mg/dL 9  [facility]   Creatinine 0.55 - 1.02 mg/dL 0.90  [facility]   GFR, Estimated >60 mL/min/1.73m2 >60  [facility]   Glucose 70 - 100 mg/dL 79       Magnesium 1.6 - 2.6 mg/dL 2.1      Will update CBC and K+ today    EKG 10/2020   Sinus tachycardia   Otherwise normal ECG     ASSESSMENT and PLAN  Yoana Webber is a 21 year old female scheduled for Vagus nerve stimulator placement with placement of generator/battery over left chest wall on 6/11/21 by Dr. Martinez in treatment of depression.  PAC referral for risk assessment and optimization for anesthesia with comorbid conditions of anxiety, OCD, PTSD:            Pre-operative considerations:      1.  Cardiac:  Functional status- METS >4. denies cardiac symptoms. previous EKG above.  intermediate risk surgery with 0.4% (RCRI #) risk of major adverse cardiac event.            2.  Pulm:  Airway feasible.  PAOLA risk: low     ~intermittent asthma using singulair daily and albuterol rarely  ~non smoker          3.  GI:  Risk of PONV score = 2.  If > 2, anti-emetic intervention recommended.            4.  psych: depression, anxiety, OCD, PTSD, borderline personality disorder   ~currently using abilify, Strattera, clonazepam, prestiq, Remeron and Geodon   ~eating disorder, will be starting a rehabilitation program at the [facility] next Tuesday. Her PCP had checked electrolytes that were all normal other than hypokalemia and started her on potassium supplement. Will repeat K+ today with other  labs  ~She scored high on her PHQ9 today- she denies any plans for self harm. She states she has chronic passive thoughts of self harm and has discussed extensively with her psychiatrist. I left a message for her psychiatrist to update her current PHQ9 score.    ~above procedure planned       5. Access: patient requests right antecubital be avoided for IV access due to past trauma. She denies any other issues with IV placement        VTE risk: 0.26%        Patient is optimized and is acceptable candidate for the proposed procedure.  No further diagnostic evaluation is needed.            Patient discussed with Dr. Pride     45 minutes were spent completing chart review, seeing the patient, reviewing labs and test results, discussing patient care with anesthesia and competing documentation     Lucina Nails PA-C  Preoperative Assessment Center  [facility]  Phone: [phone]  Fax: [phone]

## 2021-06-08 NOTE — ANESTHESIA PREPROCEDURE EVALUATION
Anesthesia Pre-Procedure Evaluation    Patient: Yoana Webber   MRN: [id number] : 2000        Preoperative Diagnosis: Severe episode of recurrent major depressive disorder, without psychotic features (H) [F33.2]   Procedure : Procedure(s):  Vagus nerve stimulator placement with placement of generator/battery over left chest wall     Past Medical History:   Diagnosis Date     Anxiety      Depressive disorder      OCD (obsessive compulsive disorder)      PTSD (post-traumatic stress disorder)       Past Surgical History:   Procedure Laterality Date     CHOLECYSTECTOMY       ENT SURGERY      tonsillectomy      Allergies   Allergen Reactions     Hydrocodone-Acetaminophen Other (See Comments), Nausea and Vomiting and Unknown     Severe hallucinations. Patient reports hallucinations        Latex Hives     Codeine Other (See Comments), Nausea and Vomiting and Unknown     Pt reports having hallucinations.  Pt reports tolerating Tylenol        Social History     Tobacco Use     Smoking status: Never Smoker     Smokeless tobacco: Never Used   Substance Use Topics     Alcohol use: Yes     Frequency: Never     Comment: drinks socially with parents on weekends      Wt Readings from Last 1 Encounters:   20 94.8 kg (208 lb 15.9 oz)        Anesthesia Evaluation   Pt has had prior anesthetic. Type: General.    No history of anesthetic complications       ROS/MED HX  ENT/Pulmonary:     (+) Intermittent, asthma Treatment: Inhaler prn,   (-) tobacco use   Neurologic: Comment: Some short and long term memory loss from ECT      Cardiovascular:     (+) -----Previous cardiac testing   Echo: Date: Results:    Stress Test: Date: Results:    ECG Reviewed: Date: 10/2020 Results:  Sinus tachycardia, otherwise normal  Cath: Date: Results:   (-) taking anticoagulants/antiplatelets and murmur   METS/Exercise Tolerance: >4 METS Comment: Walks dogs   Hematologic:  - neg hematologic  ROS  (-) history of blood transfusion    Musculoskeletal:  - neg musculoskeletal ROS     GI/Hepatic:  - neg GI/hepatic ROS     Renal/Genitourinary:  - neg Renal ROS     Endo:  - neg endo ROS     Psychiatric/Substance Use:     (+) psychiatric history anxiety and depression (PTSD, OCD)     Infectious Disease:  - neg infectious disease ROS     Malignancy:  - neg malignancy ROS     Other:            Physical Exam    Airway        Mallampati: I   TM distance: > 3 FB   Neck ROM: full   Mouth opening: > 3 cm    Respiratory Devices and Support         Dental  no notable dental history         Cardiovascular          Rhythm and rate: regular and normal (-) no murmur    Pulmonary   pulmonary exam normal        breath sounds clear to auscultation           OUTSIDE LABS:  CBC:   Lab Results   Component Value Date    WBC 7.2 09/10/2020    WBC 7.3 08/24/2020    HGB 11.9 09/10/2020    HGB 13.0 08/24/2020    HCT 36.5 09/10/2020    HCT 40.6 08/24/2020     09/10/2020     08/24/2020     BMP:   Lab Results   Component Value Date     09/10/2020     08/24/2020    POTASSIUM 3.7 09/10/2020    POTASSIUM 4.0 08/24/2020    CHLORIDE 105 09/10/2020    CHLORIDE 106 08/24/2020    CO2 24 09/10/2020    CO2 27 08/24/2020    BUN 12 09/10/2020    BUN 13 08/24/2020    CR 0.95 09/10/2020    CR 0.96 08/24/2020    GLC 71 09/10/2020    GLC 86 08/24/2020     COAGS: No results found for: PTT, INR, FIBR  POC:   Lab Results   Component Value Date    HCG Negative 09/09/2020     HEPATIC:   Lab Results   Component Value Date    ALBUMIN 2.9 (L) 09/10/2020    PROTTOTAL 6.8 09/10/2020    ALT 17 09/10/2020    AST 12 09/10/2020    ALKPHOS 57 09/10/2020    BILITOTAL 0.3 09/10/2020     OTHER:   Lab Results   Component Value Date    TAMIKO 8.1 (L) 09/10/2020    TSH 0.97 09/10/2020       Anesthesia Plan    ASA Status:  2      Anesthesia Type: General.   Induction: Intravenous.           Consents    Anesthesia Plan(s) and associated risks, benefits, and realistic alternatives discussed.  Questions answered and patient/representative(s) expressed understanding.     - Discussed with:  Patient      - Extended Intubation/Ventilatory Support Discussed: No.      - Patient is DNR/DNI Status: No    Use of blood products discussed: No .     Postoperative Care    Pain management: IV analgesics.   PONV prophylaxis: Ondansetron (or other 5HT-3), Dexamethasone or Solumedrol     Comments:              PAC Discussion and Assessment    ASA Classification: 2  Case is suitable for: [place]  Anesthetic techniques and relevant risks discussed: GA                  PAC Resident/NP Anesthesia Assessment: Yoana Webber is a 21 year old female scheduled for Vagus nerve stimulator placement with placement of generator/battery over left chest wall on 6/11/21 by Dr. Martinez in treatment of depression.  PAC referral for risk assessment and optimization for anesthesia with comorbid conditions of anxiety, OCD, PTSD:            Pre-operative considerations:      1.  Cardiac:  Functional status- METS >4. denies cardiac symptoms. previous EKG above.  intermediate risk surgery with 0.4% (RCRI #) risk of major adverse cardiac event.            2.  Pulm:  Airway feasible.  PAOLA risk: low     ~intermittent asthma using singulair daily and albuterol rarely  ~non smoker          3.  GI:  Risk of PONV score = 2.  If > 2, anti-emetic intervention recommended.            4.  psych: depression, anxiety, OCD, PTSD, borderline personality disorder   ~currently using abilify, Strattera, clonazepam, prestiq, Remeron and Geodon   ~eating disorder, will be starting a rehabilitation program at the [facility] next Tuesday. Her PCP had checked electrolytes that were all normal other than hypokalemia and started her on potassium supplement. Will repeat K+ today with other labs  ~She scored high on her PHQ9 today- she denies any plans for self harm. She states she has chronic passive thoughts of self harm and has discussed extensively with her  psychiatrist. I left a message for her psychiatrist to update her current PHQ9 score.    ~above procedure planned       5. Access: patient requests right antecubital be avoided for IV access due to past trauma. She denies any other issues with IV placement        VTE risk: 0.26%        Patient is optimized and is acceptable candidate for the proposed procedure.  No further diagnostic evaluation is needed.            Patient discussed with Dr. Pride          **For further details of assessment, testing, and physical exam please see H and P completed on same date.                  KWAME Storey PA-C

## 2021-06-08 NOTE — H&P
Pre-Operative H & P     CC:  Preoperative exam to assess for increased cardiopulmonary risk while undergoing surgery and anesthesia.    Date of Encounter: 6/8/2021  Primary Care Physician:  Ann, [facility]  associated diagnosis: depression    HPI  Yoana Webber is a 21 year old female who presents for pre-operative H & P in preparation for Vagus nerve stimulator placement with placement of generator/battery over left chest wall with Dr. collazo on 6/11/21 at [facility]. Patient is being evaluated for comorbid conditions of anxiety, OCD, PTSD      Ms. 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 She additionally has a history of anorexia nervosa, borderline personality disorder, PTSD and anxiety. She follows with psychiatry. She was referred to Dr. Collazo for further evaluation. She currently undergoes ketamine treatment with some improvement in symptoms. She discussed vagal nerve stimulation with Dr. Collazo and is now scheduled for the above procedure.     History is obtained from the patient and chart review.      Past Medical History  Past Medical History:   Diagnosis Date     Anxiety      Depressive disorder      OCD (obsessive compulsive disorder)      PTSD (post-traumatic stress disorder)        Past Surgical History  Past Surgical History:   Procedure Laterality Date     CHOLECYSTECTOMY       ENT SURGERY      tonsillectomy       Hx of Blood transfusions/reactions: denies     Hx of abnormal bleeding or anti-platelet use: denies    Menstrual history: No LMP recorded. (Menstrual status: Birth Control).    Personal or FH with difficulty with Anesthesia:  denies    Prior to Admission Medications  Current Outpatient Medications   Medication Sig Dispense Refill     albuterol (PROAIR HFA/PROVENTIL HFA/VENTOLIN HFA) 108 (90 Base) MCG/ACT inhaler Inhale 2 puffs into the lungs every 4 hours as needed for shortness of  breath / dyspnea or wheezing       ARIPiprazole (ABILIFY) 5 MG tablet Take 5 mg by mouth every morning        atomoxetine (STRATTERA) 60 MG capsule Take 60 mg by mouth every morning        clobetasol (TEMOVATE) 0.05 % CREA cream Apply topically 2 times daily as needed for eczema       desvenlafaxine (PRISTIQ) 100 MG 24 hr tablet Take 1 tablet (100 mg) by mouth At Bedtime (Patient taking differently: Take 100 mg by mouth every morning ) 30 tablet 1     ibuprofen (ADVIL/MOTRIN) 200 MG tablet Take 800 mg by mouth every 6 hours as needed (for neck/back pain)        memantine (NAMENDA) 5 MG tablet Take 1 tablet (5 mg) by mouth 2 times daily (Patient taking differently: Take 10 mg by mouth 2 times daily ) 60 tablet 1     montelukast (SINGULAIR) 10 MG tablet Take 10 mg by mouth At Bedtime        ondansetron (ZOFRAN-ODT) 8 MG ODT tab Take 8 mg by mouth every 8 hours as needed        polyethylene glycol (MIRALAX) 17 GM/Dose powder Take 1 capful by mouth as needed        prazosin (MINIPRESS) 2 MG capsule Take 1 capsule (2 mg) by mouth At Bedtime (Patient taking differently: Take 2 mg by mouth At Bedtime Taking 4 mg at bedtime) 30 capsule 1     traZODone (DESYREL) 100 MG tablet Take 50 mg by mouth At Bedtime        ziprasidone (GEODON) 60 MG capsule Take 60 mg by mouth At Bedtime        Calcium-Vitamin D-Vitamin K 500-100-40 MG-UNT-MCG CHEW        clonazePAM (KLONOPIN) 1 MG tablet Take 1 tablet (1 mg) by mouth 2 times daily as needed for anxiety 60 tablet 0     Levonorgestrel-Ethinyl Estrad (VIENVA PO) Take 1 tablet by mouth At Bedtime       mirtazapine (REMERON) 15 MG tablet Take 1 tablet (15 mg) by mouth At Bedtime (Patient not taking: Reported on 11/3/2020) 30 tablet 1     vitamin  s/Minerals TABS Take 1 tablet by mouth daily          Allergies  Allergies   Allergen Reactions     Hydrocodone-Acetaminophen Other (See Comments), Nausea and Vomiting and Unknown     Severe hallucinations. Patient reports hallucinations         Latex Hives     Codeine Other (See Comments), Nausea and Vomiting and Unknown     Pt reports having hallucinations.  Pt reports tolerating Tylenol         Social History  Social History     Socioeconomic History     Marital status: Single     Spouse name: Not on file     Number of children: Not on file     Years of education: Not on file     Highest education level: Not on file   Occupational History     Not on file   Social Needs     Financial resource strain: Not on file     Food insecurity     Worry: Not on file     Inability: Not on file     Transportation needs     Medical: Not on file     Non-medical: Not on file   Tobacco Use     Smoking status: Never Smoker     Smokeless tobacco: Never Used   Substance and Sexual Activity     Alcohol use: Not Currently     Frequency: Never     Comment: infrequent     Drug use: Not Currently     Types: Marijuana     Sexual activity: Not Currently   Lifestyle     Physical activity     Days per week: Not on file     Minutes per session: Not on file     Stress: Not on file   Relationships     Social connections     Talks on phone: Not on file     Gets together: Not on file     Attends Yazidi service: Not on file     Active member of club or organization: Not on file     Attends meetings of clubs or organizations: Not on file     Relationship status: Not on file     Intimate partner violence     Fear of current or ex partner: Not on file     Emotionally abused: Not on file     Physically abused: Not on file     Forced sexual activity: Not on file   Other Topics Concern     Not on file   Social History Narrative     Not on file       Family History  Family History   Problem Relation Age of Onset     Anesthesia Reaction No family hx of      Deep Vein Thrombosis (DVT) No family hx of        ROS/MED HX  ENT/Pulmonary:     (+) asthma  (-) tobacco use   Neurologic:  - neg neurologic ROS     Cardiovascular:     (+) -----Previous cardiac testing   Echo: Date: Results:    Stress Test:  "Date: Results:    ECG Reviewed: Date: 10/2020 Results:  Sinus tachycardia, otherwise normal  Cath: Date: Results:   (-) taking anticoagulants/antiplatelets   METS/Exercise Tolerance:     Hematologic:  - neg hematologic  ROS  (-) history of blood transfusion   Musculoskeletal:  - neg musculoskeletal ROS     GI/Hepatic:  - neg GI/hepatic ROS     Renal/Genitourinary:  - neg Renal ROS     Endo:  - neg endo ROS     Psychiatric/Substance Use:     (+) psychiatric history anxiety and depression (PTSD, OCD)     Infectious Disease:  - neg infectious disease ROS     Malignancy:  - neg malignancy ROS     Other:          The complete review of systems is negative other than noted in the HPI or here.   Temp: 98.2  F (36.8  C) Temp src: Oral BP: 113/83 Pulse: 116   Resp: 20 SpO2: 98 %         176 lbs 8 oz  5' 4\"[pt reported[   Body mass index is 30.3 kg/m .       Physical Exam  Constitutional: Awake, alert, cooperative, no apparent distress, and appears stated age.  Eyes: Pupils equal, round and reactive to light, extra ocular muscles intact, sclera clear, conjunctiva normal.  HENT: Normocephalic, oral pharynx with moist mucus membranes, good dentition.   Respiratory: Clear to auscultation bilaterally, no crackles or wheezing.  Cardiovascular: Regular rate and rhythm, normal S1 and S2, and no murmur noted.  Carotids no bruits. No edema. Palpable pulses to radial arteries.   GI: Normal bowel sounds, soft, non-distended, non-tender  Genitourinary:  deferred  Skin: Warm and dry.  No rashes at anticipated surgical site.   Musculoskeletal: Full ROM of neck. There is no redness, warmth, or swelling of the exposed joints. Gross motor strength is normal.    Neurologic: Awake, alert, oriented to name, place and time. Cranial nerves II-XII are grossly intact. Gait is normal.   Neuropsychiatric: Calm, cooperative. Normal affect.     Labs: (personally reviewed)  4/20/21  Sodium 136 - 145 mmol/L 140  [facility] "   Potassium 3.5 - 5.1 mmol/L 3.4Low   [facility]   Chloride 98 - 109 mmol/L 107  [facility]   CO2 20 - 29 mmol/L 19Low   [facility]   Anion Gap 7 - 16 mmol/L 14  [facility]   Calcium 8.4 - 10.4 mg/dL 8.9  [facility]   BUN 7 - 26 mg/dL 9  [facility]   Creatinine 0.55 - 1.02 mg/dL 0.90  [facility]   GFR, Estimated >60 mL/min/1.73m2 >60  [facility]   Glucose 70 - 100 mg/dL 79       Magnesium 1.6 - 2.6 mg/dL 2.1      Will update CBC and K+ today    EKG 10/2020   Sinus tachycardia   Otherwise normal ECG     ASSESSMENT and PLAN  Yoana Webber is a 21 year old female scheduled for Vagus nerve stimulator placement with placement of generator/battery over left chest wall on 6/11/21 by Dr. Martinez in treatment of depression.  PAC referral for risk assessment and optimization for anesthesia with comorbid conditions of anxiety, OCD, PTSD:            Pre-operative considerations:      1.  Cardiac:  Functional status- METS >4. denies cardiac symptoms. previous EKG above.  intermediate risk surgery with 0.4% (RCRI #) risk of major adverse cardiac event.            2.  Pulm:  Airway feasible.  PAOLA risk: low     ~intermittent asthma using singulair daily and albuterol rarely  ~non smoker          3.  GI:  Risk of PONV score = 2.  If > 2, anti-emetic intervention recommended.            4.  psych: depression, anxiety, OCD, PTSD, borderline personality disorder   ~currently using abilify, Strattera, clonazepam, prestiq, Remeron and Geodon   ~eating disorder, will be starting a rehabilitation program at the [facility] next Tuesday. Her PCP had checked electrolytes that were all normal other than hypokalemia and started her on potassium supplement. Will repeat K+ today with other  labs  ~She scored high on her PHQ9 today- she denies any plans for self harm. She states she has chronic passive thoughts of self harm and has discussed extensively with her psychiatrist. I left a message for her psychiatrist to update her current PHQ9 score.    ~above procedure planned       5. Access: patient requests right antecubital be avoided for IV access due to past trauma. She denies any other issues with IV placement        VTE risk: 0.26%        Patient is optimized and is acceptable candidate for the proposed procedure.  No further diagnostic evaluation is needed.            Patient discussed with Dr. Pride     45 minutes were spent completing chart review, seeing the patient, reviewing labs and test results, discussing patient care with anesthesia and competing documentation     Lucina Nails PA-C  Preoperative Assessment Center  [facility]  Phone: [phone]  Fax: [phone]

## 2021-06-11 ENCOUNTER — APPOINTMENT (OUTPATIENT)
Dept: GENERAL RADIOLOGY | Facility: CLINIC | Age: 21
End: 2021-06-11
Attending: STUDENT IN AN ORGANIZED HEALTH CARE EDUCATION/TRAINING PROGRAM
Payer: COMMERCIAL

## 2021-06-11 ENCOUNTER — ANESTHESIA (OUTPATIENT)
Dept: SURGERY | Facility: CLINIC | Age: 21
End: 2021-06-11
Payer: COMMERCIAL

## 2021-06-11 ENCOUNTER — HOSPITAL ENCOUNTER (OUTPATIENT)
Facility: CLINIC | Age: 21
Discharge: HOME | End: 2021-06-11
Attending: NEUROLOGICAL SURGERY | Admitting: NEUROLOGICAL SURGERY
Payer: COMMERCIAL

## 2021-06-11 VITALS
RESPIRATION RATE: 14 BRPM | BODY MASS INDEX: 29.7 KG/M2 | HEIGHT: 64 IN | DIASTOLIC BLOOD PRESSURE: 88 MMHG | OXYGEN SATURATION: 97 % | SYSTOLIC BLOOD PRESSURE: 125 MMHG | WEIGHT: 173.94 LBS | HEART RATE: 104 BPM | TEMPERATURE: 97.9 F

## 2021-06-11 DIAGNOSIS — Z96.89 S/P PLACEMENT OF VNS (VAGUS NERVE STIMULATION) DEVICE: Primary | ICD-10-CM

## 2021-06-11 DIAGNOSIS — F33.2 SEVERE EPISODE OF RECURRENT MAJOR DEPRESSIVE DISORDER, WITHOUT PSYCHOTIC FEATURES (H): ICD-10-CM

## 2021-06-11 LAB
ANION GAP SERPL CALCULATED.3IONS-SCNC: 6 MMOL/L (ref 3–14)
APTT PPP: 30 SEC (ref 22–37)
BUN SERPL-MCNC: 8 MG/DL (ref 7–30)
CALCIUM SERPL-MCNC: 9.1 MG/DL (ref 8.5–10.1)
CHLORIDE SERPL-SCNC: 106 MMOL/L (ref 94–109)
CO2 SERPL-SCNC: 25 MMOL/L (ref 20–32)
CREAT SERPL-MCNC: 0.9 MG/DL (ref 0.52–1.04)
ERYTHROCYTE [DISTWIDTH] IN BLOOD BY AUTOMATED COUNT: 13 % (ref 10–15)
GFR SERPL CREATININE-BSD FRML MDRD: >90 ML/MIN/{1.73_M2}
GLUCOSE BLDC GLUCOMTR-MCNC: 98 MG/DL (ref 70–99)
GLUCOSE SERPL-MCNC: 86 MG/DL (ref 70–99)
HCG UR QL: NEGATIVE
HCT VFR BLD AUTO: 39.5 % (ref 35–47)
HGB BLD-MCNC: 13 G/DL (ref 11.7–15.7)
INR PPP: 1.16 (ref 0.86–1.14)
MCH RBC QN AUTO: 31.1 PG (ref 26.5–33)
MCHC RBC AUTO-ENTMCNC: 32.9 G/DL (ref 31.5–36.5)
MCV RBC AUTO: 95 FL (ref 78–100)
PLATELET # BLD AUTO: 185 10E9/L (ref 150–450)
POTASSIUM SERPL-SCNC: 3.2 MMOL/L (ref 3.4–5.3)
RBC # BLD AUTO: 4.18 10E12/L (ref 3.8–5.2)
SODIUM SERPL-SCNC: 138 MMOL/L (ref 133–144)
WBC # BLD AUTO: 6.2 10E9/L (ref 4–11)

## 2021-06-11 PROCEDURE — 82962 GLUCOSE BLOOD TEST: CPT

## 2021-06-11 PROCEDURE — 250N000009 HC RX 250: Performed by: NURSE ANESTHETIST, CERTIFIED REGISTERED

## 2021-06-11 PROCEDURE — C1767 GENERATOR, NEURO NON-RECHARG: HCPCS | Performed by: NEUROLOGICAL SURGERY

## 2021-06-11 PROCEDURE — 36415 COLL VENOUS BLD VENIPUNCTURE: CPT | Performed by: NEUROLOGICAL SURGERY

## 2021-06-11 PROCEDURE — 999N000065 XR CHEST PORT 1 VIEW

## 2021-06-11 PROCEDURE — 710N000010 HC RECOVERY PHASE 1, LEVEL 2, PER MIN: Performed by: NEUROLOGICAL SURGERY

## 2021-06-11 PROCEDURE — 710N000012 HC RECOVERY PHASE 2, PER MINUTE: Performed by: NEUROLOGICAL SURGERY

## 2021-06-11 PROCEDURE — 250N000013 HC RX MED GY IP 250 OP 250 PS 637: Performed by: ANESTHESIOLOGY

## 2021-06-11 PROCEDURE — 250N000025 HC SEVOFLURANE, PER MIN: Performed by: NEUROLOGICAL SURGERY

## 2021-06-11 PROCEDURE — 258N000003 HC RX IP 258 OP 636: Performed by: NURSE ANESTHETIST, CERTIFIED REGISTERED

## 2021-06-11 PROCEDURE — 999N000141 HC STATISTIC PRE-PROCEDURE NURSING ASSESSMENT: Performed by: NEUROLOGICAL SURGERY

## 2021-06-11 PROCEDURE — C1778 LEAD, NEUROSTIMULATOR: HCPCS | Performed by: NEUROLOGICAL SURGERY

## 2021-06-11 PROCEDURE — 250N000011 HC RX IP 250 OP 636: Performed by: ANESTHESIOLOGY

## 2021-06-11 PROCEDURE — 85610 PROTHROMBIN TIME: CPT | Performed by: NEUROLOGICAL SURGERY

## 2021-06-11 PROCEDURE — 370N000017 HC ANESTHESIA TECHNICAL FEE, PER MIN: Performed by: NEUROLOGICAL SURGERY

## 2021-06-11 PROCEDURE — 85730 THROMBOPLASTIN TIME PARTIAL: CPT | Performed by: NEUROLOGICAL SURGERY

## 2021-06-11 PROCEDURE — 272N000001 HC OR GENERAL SUPPLY STERILE: Performed by: NEUROLOGICAL SURGERY

## 2021-06-11 PROCEDURE — 71045 X-RAY EXAM CHEST 1 VIEW: CPT | Mod: 26 | Performed by: RADIOLOGY

## 2021-06-11 PROCEDURE — 250N000009 HC RX 250: Performed by: NEUROLOGICAL SURGERY

## 2021-06-11 PROCEDURE — 81025 URINE PREGNANCY TEST: CPT | Performed by: ANESTHESIOLOGY

## 2021-06-11 PROCEDURE — 250N000011 HC RX IP 250 OP 636: Performed by: NURSE ANESTHETIST, CERTIFIED REGISTERED

## 2021-06-11 PROCEDURE — 85027 COMPLETE CBC AUTOMATED: CPT | Performed by: NEUROLOGICAL SURGERY

## 2021-06-11 PROCEDURE — 360N000076 HC SURGERY LEVEL 3, PER MIN: Performed by: NEUROLOGICAL SURGERY

## 2021-06-11 PROCEDURE — 250N000011 HC RX IP 250 OP 636: Performed by: NEUROLOGICAL SURGERY

## 2021-06-11 PROCEDURE — 80048 BASIC METABOLIC PNL TOTAL CA: CPT | Performed by: NEUROLOGICAL SURGERY

## 2021-06-11 DEVICE — IMPLANTABLE DEVICE: Type: IMPLANTABLE DEVICE | Site: CHEST | Status: FUNCTIONAL

## 2021-06-11 RX ORDER — METOCLOPRAMIDE HYDROCHLORIDE 5 MG/ML
10 INJECTION INTRAMUSCULAR; INTRAVENOUS EVERY 6 HOURS PRN
Status: DISCONTINUED | OUTPATIENT
Start: 2021-06-11 | End: 2021-06-11 | Stop reason: HOSPADM

## 2021-06-11 RX ORDER — ONDANSETRON 4 MG/1
4 TABLET, ORALLY DISINTEGRATING ORAL EVERY 30 MIN PRN
Status: DISCONTINUED | OUTPATIENT
Start: 2021-06-11 | End: 2021-06-11 | Stop reason: HOSPADM

## 2021-06-11 RX ORDER — ONDANSETRON 2 MG/ML
INJECTION INTRAMUSCULAR; INTRAVENOUS PRN
Status: DISCONTINUED | OUTPATIENT
Start: 2021-06-11 | End: 2021-06-11

## 2021-06-11 RX ORDER — NALOXONE HYDROCHLORIDE 0.4 MG/ML
0.4 INJECTION, SOLUTION INTRAMUSCULAR; INTRAVENOUS; SUBCUTANEOUS
Status: DISCONTINUED | OUTPATIENT
Start: 2021-06-11 | End: 2021-06-11 | Stop reason: HOSPADM

## 2021-06-11 RX ORDER — LIDOCAINE HYDROCHLORIDE AND EPINEPHRINE 10; 10 MG/ML; UG/ML
INJECTION, SOLUTION INFILTRATION; PERINEURAL PRN
Status: DISCONTINUED | OUTPATIENT
Start: 2021-06-11 | End: 2021-06-11 | Stop reason: HOSPADM

## 2021-06-11 RX ORDER — OXYCODONE HYDROCHLORIDE 5 MG/1
5 TABLET ORAL EVERY 6 HOURS PRN
Qty: 12 TABLET | Refills: 0 | Status: SHIPPED | OUTPATIENT
Start: 2021-06-11 | End: 2021-06-25

## 2021-06-11 RX ORDER — PROPOFOL 10 MG/ML
INJECTION, EMULSION INTRAVENOUS PRN
Status: DISCONTINUED | OUTPATIENT
Start: 2021-06-11 | End: 2021-06-11

## 2021-06-11 RX ORDER — ONDANSETRON 4 MG/1
4 TABLET, FILM COATED ORAL EVERY 8 HOURS PRN
Qty: 30 TABLET | Refills: 0 | Status: SHIPPED | OUTPATIENT
Start: 2021-06-11 | End: 2021-06-11

## 2021-06-11 RX ORDER — HYDROMORPHONE HYDROCHLORIDE 1 MG/ML
.3-.5 INJECTION, SOLUTION INTRAMUSCULAR; INTRAVENOUS; SUBCUTANEOUS EVERY 10 MIN PRN
Status: DISCONTINUED | OUTPATIENT
Start: 2021-06-11 | End: 2021-06-11 | Stop reason: HOSPADM

## 2021-06-11 RX ORDER — LIDOCAINE 40 MG/G
CREAM TOPICAL
Status: DISCONTINUED | OUTPATIENT
Start: 2021-06-11 | End: 2021-06-11 | Stop reason: HOSPADM

## 2021-06-11 RX ORDER — ONDANSETRON 2 MG/ML
4 INJECTION INTRAMUSCULAR; INTRAVENOUS EVERY 30 MIN PRN
Status: DISCONTINUED | OUTPATIENT
Start: 2021-06-11 | End: 2021-06-11 | Stop reason: HOSPADM

## 2021-06-11 RX ORDER — CEPHALEXIN 500 MG/1
500 CAPSULE ORAL 3 TIMES DAILY
Qty: 21 CAPSULE | Refills: 0 | Status: SHIPPED | OUTPATIENT
Start: 2021-06-11 | End: 2021-06-18

## 2021-06-11 RX ORDER — NALOXONE HYDROCHLORIDE 0.4 MG/ML
0.2 INJECTION, SOLUTION INTRAMUSCULAR; INTRAVENOUS; SUBCUTANEOUS
Status: DISCONTINUED | OUTPATIENT
Start: 2021-06-11 | End: 2021-06-11 | Stop reason: HOSPADM

## 2021-06-11 RX ORDER — DEXAMETHASONE SODIUM PHOSPHATE 4 MG/ML
INJECTION, SOLUTION INTRA-ARTICULAR; INTRALESIONAL; INTRAMUSCULAR; INTRAVENOUS; SOFT TISSUE PRN
Status: DISCONTINUED | OUTPATIENT
Start: 2021-06-11 | End: 2021-06-11

## 2021-06-11 RX ORDER — DEXAMETHASONE SODIUM PHOSPHATE 4 MG/ML
4 INJECTION, SOLUTION INTRA-ARTICULAR; INTRALESIONAL; INTRAMUSCULAR; INTRAVENOUS; SOFT TISSUE ONCE
Status: DISCONTINUED | OUTPATIENT
Start: 2021-06-11 | End: 2021-06-11 | Stop reason: HOSPADM

## 2021-06-11 RX ORDER — LIDOCAINE HYDROCHLORIDE 20 MG/ML
INJECTION, SOLUTION INFILTRATION; PERINEURAL PRN
Status: DISCONTINUED | OUTPATIENT
Start: 2021-06-11 | End: 2021-06-11

## 2021-06-11 RX ORDER — SODIUM CHLORIDE, SODIUM LACTATE, POTASSIUM CHLORIDE, CALCIUM CHLORIDE 600; 310; 30; 20 MG/100ML; MG/100ML; MG/100ML; MG/100ML
INJECTION, SOLUTION INTRAVENOUS CONTINUOUS
Status: DISCONTINUED | OUTPATIENT
Start: 2021-06-11 | End: 2021-06-11 | Stop reason: HOSPADM

## 2021-06-11 RX ORDER — DEXMEDETOMIDINE HYDROCHLORIDE 4 UG/ML
0.2-0.7 INJECTION, SOLUTION INTRAVENOUS CONTINUOUS
Status: DISCONTINUED | OUTPATIENT
Start: 2021-06-11 | End: 2021-06-11 | Stop reason: HOSPADM

## 2021-06-11 RX ORDER — ACETAMINOPHEN 325 MG/1
975 TABLET ORAL ONCE
Status: COMPLETED | OUTPATIENT
Start: 2021-06-11 | End: 2021-06-11

## 2021-06-11 RX ORDER — METOCLOPRAMIDE 10 MG/1
10 TABLET ORAL EVERY 6 HOURS PRN
Status: DISCONTINUED | OUTPATIENT
Start: 2021-06-11 | End: 2021-06-11 | Stop reason: HOSPADM

## 2021-06-11 RX ORDER — FENTANYL CITRATE 50 UG/ML
25-50 INJECTION, SOLUTION INTRAMUSCULAR; INTRAVENOUS
Status: DISCONTINUED | OUTPATIENT
Start: 2021-06-11 | End: 2021-06-11 | Stop reason: HOSPADM

## 2021-06-11 RX ORDER — CEFAZOLIN SODIUM 2 G/100ML
2 INJECTION, SOLUTION INTRAVENOUS SEE ADMIN INSTRUCTIONS
Status: DISCONTINUED | OUTPATIENT
Start: 2021-06-11 | End: 2021-06-11 | Stop reason: HOSPADM

## 2021-06-11 RX ORDER — MEPERIDINE HYDROCHLORIDE 25 MG/ML
12.5 INJECTION INTRAMUSCULAR; INTRAVENOUS; SUBCUTANEOUS
Status: DISCONTINUED | OUTPATIENT
Start: 2021-06-11 | End: 2021-06-11 | Stop reason: HOSPADM

## 2021-06-11 RX ORDER — ACETAMINOPHEN 325 MG/1
325-650 TABLET ORAL EVERY 4 HOURS PRN
Qty: 84 TABLET | Refills: 0 | Status: SHIPPED | OUTPATIENT
Start: 2021-06-11 | End: 2021-06-18

## 2021-06-11 RX ORDER — FENTANYL CITRATE 50 UG/ML
INJECTION, SOLUTION INTRAMUSCULAR; INTRAVENOUS PRN
Status: DISCONTINUED | OUTPATIENT
Start: 2021-06-11 | End: 2021-06-11

## 2021-06-11 RX ORDER — CEFAZOLIN SODIUM 2 G/100ML
2 INJECTION, SOLUTION INTRAVENOUS
Status: COMPLETED | OUTPATIENT
Start: 2021-06-11 | End: 2021-06-11

## 2021-06-11 RX ORDER — ONDANSETRON 4 MG/1
4 TABLET, FILM COATED ORAL EVERY 8 HOURS PRN
Qty: 30 TABLET | Refills: 0 | Status: SHIPPED | OUTPATIENT
Start: 2021-06-11

## 2021-06-11 RX ORDER — SODIUM CHLORIDE, SODIUM LACTATE, POTASSIUM CHLORIDE, CALCIUM CHLORIDE 600; 310; 30; 20 MG/100ML; MG/100ML; MG/100ML; MG/100ML
INJECTION, SOLUTION INTRAVENOUS CONTINUOUS PRN
Status: DISCONTINUED | OUTPATIENT
Start: 2021-06-11 | End: 2021-06-11

## 2021-06-11 RX ADMIN — Medication 1 TABLET: at 12:50

## 2021-06-11 RX ADMIN — PHENYLEPHRINE HYDROCHLORIDE 100 MCG: 10 INJECTION INTRAVENOUS at 08:43

## 2021-06-11 RX ADMIN — ONDANSETRON 4 MG: 2 INJECTION INTRAMUSCULAR; INTRAVENOUS at 09:48

## 2021-06-11 RX ADMIN — DEXMEDETOMIDINE HYDROCHLORIDE 8 MCG: 100 INJECTION, SOLUTION INTRAVENOUS at 09:06

## 2021-06-11 RX ADMIN — PHENYLEPHRINE HYDROCHLORIDE 100 MCG: 10 INJECTION INTRAVENOUS at 08:30

## 2021-06-11 RX ADMIN — DEXAMETHASONE SODIUM PHOSPHATE 8 MG: 4 INJECTION, SOLUTION INTRA-ARTICULAR; INTRALESIONAL; INTRAMUSCULAR; INTRAVENOUS; SOFT TISSUE at 07:48

## 2021-06-11 RX ADMIN — SODIUM CHLORIDE, POTASSIUM CHLORIDE, SODIUM LACTATE AND CALCIUM CHLORIDE: 600; 310; 30; 20 INJECTION, SOLUTION INTRAVENOUS at 07:32

## 2021-06-11 RX ADMIN — ACETAMINOPHEN 975 MG: 325 TABLET, FILM COATED ORAL at 12:49

## 2021-06-11 RX ADMIN — PROPOFOL 180 MG: 10 INJECTION, EMULSION INTRAVENOUS at 07:37

## 2021-06-11 RX ADMIN — FENTANYL CITRATE 50 MCG: 50 INJECTION, SOLUTION INTRAMUSCULAR; INTRAVENOUS at 11:21

## 2021-06-11 RX ADMIN — PHENYLEPHRINE HYDROCHLORIDE 100 MCG: 10 INJECTION INTRAVENOUS at 08:20

## 2021-06-11 RX ADMIN — ONDANSETRON 4 MG: 2 INJECTION INTRAMUSCULAR; INTRAVENOUS at 11:48

## 2021-06-11 RX ADMIN — FENTANYL CITRATE 50 MCG: 50 INJECTION, SOLUTION INTRAMUSCULAR; INTRAVENOUS at 09:36

## 2021-06-11 RX ADMIN — MIDAZOLAM 2 MG: 1 INJECTION INTRAMUSCULAR; INTRAVENOUS at 07:27

## 2021-06-11 RX ADMIN — CEFAZOLIN 2 G: 10 INJECTION, POWDER, FOR SOLUTION INTRAVENOUS at 07:45

## 2021-06-11 RX ADMIN — FENTANYL CITRATE 50 MCG: 50 INJECTION, SOLUTION INTRAMUSCULAR; INTRAVENOUS at 10:53

## 2021-06-11 RX ADMIN — FENTANYL CITRATE 100 MCG: 50 INJECTION, SOLUTION INTRAMUSCULAR; INTRAVENOUS at 07:37

## 2021-06-11 RX ADMIN — LIDOCAINE HYDROCHLORIDE 100 MG: 20 INJECTION, SOLUTION INFILTRATION; PERINEURAL at 07:37

## 2021-06-11 RX ADMIN — FENTANYL CITRATE 50 MCG: 50 INJECTION, SOLUTION INTRAMUSCULAR; INTRAVENOUS at 08:14

## 2021-06-11 ASSESSMENT — MIFFLIN-ST. JEOR: SCORE: 1529

## 2021-06-11 NOTE — ANESTHESIA PROCEDURE NOTES
Airway       Patient location during procedure: OR  Staff -        CRNA: Amrita Reinoso APRN CRNA       Performed By: CRNA  Consent for Airway        Urgency: elective  Indications and Patient Condition       Indications for airway management: nancie-procedural       Induction type:intravenous       Mask difficulty assessment: 1 - vent by mask    Final Airway Details       Final airway type: endotracheal airway       Successful airway: ETT - single  Endotracheal Airway Details        ETT size (mm): 7.0       Cuffed: yes       Successful intubation technique: direct laryngoscopy       DL Blade Type: Demarco 2       Grade View of Cords: 1       Adjucts: stylet       Position: Right       Measured from: gums/teeth       Secured at (cm): 21       Bite block used: None    Post intubation assessment        Placement verified by: capnometry, equal breath sounds and chest rise        Number of attempts at approach: 1       Secured with: silk tape       Ease of procedure: easy       Dentition: Intact and Unchanged    Medication(s) Administered   Medication Administration Time: 6/11/2021 7:42 AM

## 2021-06-11 NOTE — DISCHARGE INSTRUCTIONS
Same-Day Surgery   Adult Discharge Orders & Instructions     For 24 hours after surgery:  1. Get plenty of rest.  A responsible adult must stay with you for at least 24 hours after you leave the hospital.   2. Pain medication can slow your reflexes. Do not drive or use heavy equipment.  If you have weakness or tingling, don't drive or use heavy equipment until this feeling goes away.  3. Mixing alcohol and pain medication can cause dizziness and slow your breathing. It can even be fatal. Do not drink alcohol while taking pain medication.  4. Avoid strenuous or risky activities.  Ask for help when climbing stairs.   5. You may feel lightheaded.  If so, sit for a few minutes before standing.  Have someone help you get up.   6. If you have nausea (feel sick to your stomach), drink only clear liquids such as apple juice, ginger ale, broth or 7-Up.  Rest may also help.  Be sure to drink enough fluids.  Move to a regular diet as you feel able. Take pain medications with a small amount of solid food, such as toast or crackers, to avoid nausea.   7. A slight fever is normal. Call the doctor if your fever is over 100 F (37.7 C) (taken under the tongue) or lasts longer than 24 hours.  8. You may have a dry mouth, muscle aches, trouble sleeping or a sore throat.  These symptoms should go away after 24 hours.  9. Do not make important or legal decisions.   Pain Management:      1. Take pain medication (if prescribed) for pain as directed by your physician.        2. WARNING: If the pain medication you have been prescribed contains Tylenol  (acetaminophen), DO NOT take additional doses of Tylenol (acetaminophen).     Call your doctor for any of the followin.  Signs of infection (fever, growing tenderness at the surgery site, severe pain, a large amount of drainage or bleeding, foul-smelling drainage, redness, swelling).    2.  It has been over 8 to 10 hours since surgery and you are still not able to urinate (pee).    3.   Headache for over 24 hours.      To contact a doctor, call Dr. MartinezHitfwe____[phone]___ or:      [phone] and ask for the Resident On Call for:          ________neurosurgery______ (answered 24 hours a day)      Emergency Department:  [place] [facility]: [phone]  [place] [facility]: [phone]               Rev. 10/2014

## 2021-06-11 NOTE — OR NURSING
JOSE Yates paged [phone]: WILBERTO LYLA is 3.2 this AM.     Consent signed, labs completed. Otherwise no concerns. VSS.

## 2021-06-11 NOTE — ANESTHESIA POSTPROCEDURE EVALUATION
Patient: Yoana Webber    Procedure(s):  Vagus nerve stimulator placement with placement of generator/battery over left chest wall  **Latex Allergy**    Diagnosis:Severe episode of recurrent major depressive disorder, without psychotic features (H) [F33.2]  Diagnosis Additional Information: No value filed.    Anesthesia Type:  General    Note:  Disposition: Outpatient   Postop Pain Control: Uneventful            Sign Out: Well controlled pain   PONV: No   Neuro/Psych: Uneventful            Sign Out: Acceptable/Baseline neuro status   Airway/Respiratory: Uneventful            Sign Out: Acceptable/Baseline resp. status   CV/Hemodynamics: Uneventful            Sign Out: Acceptable CV status; No obvious hypovolemia; No obvious fluid overload   Other NRE: NONE   DID A NON-ROUTINE EVENT OCCUR? No           Last vitals:  Vitals:    06/11/21 1100 06/11/21 1115 06/11/21 1117   BP: 121/80 116/78 116/78   Pulse: 105 102 106   Resp: 11 12 10   Temp:  37.2  C (98.9  F)    SpO2: 94% 96% 96%       Last vitals prior to Anesthesia Care Transfer:  CRNA VITALS  6/11/2021 0956 - 6/11/2021 1056      6/11/2021             NIBP:  114/80    Ht Rate:  113          Electronically Signed By: Reji Yates MD  June 11, 2021  11:57 AM

## 2021-06-11 NOTE — ANESTHESIA CARE TRANSFER NOTE
Patient: Yoana Webber    Procedure(s):  Vagus nerve stimulator placement with placement of generator/battery over left chest wall  **Latex Allergy**    Diagnosis: Severe episode of recurrent major depressive disorder, without psychotic features (H) [F33.2]  Diagnosis Additional Information: No value filed.    Anesthesia Type:   General     Note:    Oropharynx: oropharynx clear of all foreign objects and spontaneously breathing  Level of Consciousness: awake  Oxygen Supplementation: nasal cannula  Level of Supplemental Oxygen (L/min / FiO2): 3  Independent Airway: airway patency satisfactory and stable  Dentition: dentition unchanged  Vital Signs Stable: post-procedure vital signs reviewed and stable  Report to RN Given: handoff report given  Patient transferred to: PACU    Handoff Report: Identifed the Patient, Identified the Reponsible Provider, Reviewed the pertinent medical history, Discussed the surgical course, Reviewed Intra-OP anesthesia mangement and issues during anesthesia, Set expectations for post-procedure period and Allowed opportunity for questions and acknowledgement of understanding      Vitals: (Last set prior to Anesthesia Care Transfer)  CRNA VITALS  6/11/2021 0956 - 6/11/2021 1030      6/11/2021             Pulse:  117    SpO2:  100 %        Electronically Signed By: HANY Triana CRNA  June 11, 2021  10:30 AM

## 2021-06-11 NOTE — OP NOTE
Procedure Date: 06/11/2021    ATTENDING NEUROSURGEON:  Andres Martinez MD    RESIDENT ASSISTANTS:     1.  Andres Cavanaugh MD, PhD  2.  Sunny Coronel MD, PhD    PROCEDURE PERFORMED:  Left-sided placement of vagal nerve stimulator and left-sided generator placed in the left chest wall.    PREOPERATIVE DIAGNOSES:    1.  Recurrent major depressive disorder without psychotic features.  2.  Treatment-resistant depression.    POSTOPERATIVE DIAGNOSES:    1.  Recurrent major depressive disorder without psychotic features.  2.  Treatment-resistant depression.    ANESTHESIA:  General endotracheal with local.    COMPLICATIONS:  Nil.    IMPLANTS:  VNS Perinnial FLEX electrode (2mm diameter) placed on the left vagus nerve and Sentiva battery placed in the left chest wall.    ESTIMATED BLOOD LOSS:  5 mL    DRAINS:  None.    SPECIMENS:  None.    FINDINGS Nil.    INDICATIONS FOR PROCEDURE:  Yoana Webber is a 21-year-old female with a significant past medical history of treatment-refractory depression that is recurrent and severe without psychosis.  She also has a history of anorexia nervosa, borderline personality disorder, PTSD, generalized anxiety disorder, who was seen at the [facility] regarding vagal nerve stimulation for her treatment-resistant depression.  She has had no previous neck surgery or radiation.  Risks, benefits and alternatives were thoroughly explained to the patient.  Questions were elicited and answered.  The patient agreed to proceed with the aforementioned surgery.    DESCRIPTION OF PROCEDURE:  The patient was identified in the preoperative holding area using 2 unique identifiers.  Informed consent was signed and verified.    Our Anesthesia colleagues brought the patient back to the operating theater.  The patient was transferred to the operating table.  General anesthesia was induced.  The patient was successfully endotracheally intubated.  Adequate IV access was  obtained.  A small shoulder bump was placed laterally underneath both scapula.  The patient's left neck and chest wall were identified.  Proposed incisions were marked out at both locations.  These sites were prepped and draped in the standard sterile fashion.  Surgical timeout was then performed confirming the patient's identity, site and side of surgery, procedure to be performed, administration of preoperative prophylactic antibiotics and presence of the appropriate imaging as well as the device representative in the room.    Following the completion of the surgical timeout, a 15 blade scalpel was used to incise the skin and subcutaneous tissue 2 fingerbreadths above the clavicle on the left hand side from the midline of the neck to the medial border of the sternocleidomastoid.  Bipolar cautery was used to establish hemostasis.  Retraction was then used to identify the platysma.  Platysma was then sharply divided.  Bipolar cautery was used to establish hemostasis.  Blunt dissection was used to identify the carotid sheath that was at the intersection of the superior border of the omohyoid and the inferior medial border of the sternocleidomastoid in the carotid triangle. Jugular vein and carotid sheath were both identified.  The jugular vein was mobilized laterally.  The carotid was mobilized medially and the vagus nerve was immediately apparent.  The vagus nerve was undermined utilizing a Byron 90-degree angle.  A set of vessel loops helped to identify and isolate the vagus nerve, 1 was placed cranially and the other caudally.  The vagal nerve stimulator was brought onto the field.  It was uncoiled and then the 3 separate coils were carefully coiled around the vagus nerve.  Once satisfied with that, we tacked the stimulator electrode to the sternocleidomastoid in a curvilinear fashion.    Concurrent with this, we used a 15 blade scalpel to make a linear incision several fingerbreadths below the left-sided  clavicle.  Used monopolar cautery to dissect through the subcutaneous fat tissue.  We had identified the prepectoral fascial layer and continued dissecting that in a blunt manner in the caudal direction and then used electrocautery to remove any fibrous attachments. Satisfied with the implantable pulse generator pocket, we copiously irrigated the pocket.  We then turned our attention to the tunneling.  The tunneler was brought onto the field and tunneled supraclavicularly from the left-sided neck incision to the lateral aspect of the chest wall pocket.  There, the distal portion of the  was removed, leaving the plastic sheath in place.  The distal end of the electrode was placed in the plastic sheath and then pulled through.  This electrode was then hooked up to the implantable pulse generator and coiled behind it and placed in the pocket.  Impedances were checked and diagnostics were run and all was found to be satisfactory and running appropriately.  An Ethibond suture was then used to tack the implantable pulse generator down.  Both wounds were copiously irrigated with antibiotic-impregnated saline.    We then turned our attention to closure.  Vicryl 3-0 sutures were used to close the platysma in the neck of this incision, 3-0 Vicryls were used to close the subcutaneous pocket in the subclavicular incision as well.  Dermis was reapproximated utilizing 3-0 Vicryl sutures placed in inverted interrupted manner and skin closed utilizing 4-0 Monocryl in a running subcuticular fashion.  Both wounds were cleaned, dried and chloraprepped.  Exofin was applied.  Drapes were taken down.  Final check the impedances was performed and all were found to be normal limits.  The patient was extubated successfully and after being returned our Anesthesia colleagues, was transferred to the [facility].  All sponge, needle and instrument counts were correct x 2 at the conclusion of the procedure.      Dr. Martinez was  present and scrubbed for all critical portions of the procedure and immediately available for the remainder.    Andres Martinez MD    As Dictated by ANDRES MARIA MD        D: 2021   T: 2021   MT: PAKMT    Name:     VANESSA WILL  MRN:      [id number]        Account:        [id number]   :      2000           Procedure Date: 2021     Document: [id number]

## 2021-06-14 ENCOUNTER — PATIENT OUTREACH (OUTPATIENT)
Dept: NEUROSURGERY | Facility: CLINIC | Age: 21
End: 2021-06-14

## 2021-06-14 NOTE — PROGRESS NOTES
[facility]: Post-Discharge Note  SITUATION                                                      Admission:    Admission Date: 06/11/21   Reason for Admission: Left-sided placement of vagal nerve stimulator and left-sided generator placed in the left chest wall  Discharge:   Discharge Date: 06/11/21  Discharge Diagnosis: Recurrent major depressive disorder without psychotic features.  Discharge Service: Neurosurgery  Discharge Plan: Routine postop follow up    BACKGROUND                                                      Neurosurgery Discharge Coordination Note     Attending physician: Dr. Martinez  Discharge to: Home     Current status: Patient states she has some incisional pain but taking Tylenol and oxycodone as needed with adequate relief. Chest incision has some redness per pt, but not different from time of discharge. Denies redness at neck incision. For both incisions, denies swelling, increased tenderness, drainage, incision opening or elevated temp. Reports incisions CDI without signs of infection.  Denies current bowel or bladder issues.    Pt has questions about the results of chest xray. Atelectasis can happen after surgery. Pt is asymptomatic, no breathing issues. If pt has an incentive spirometer she can use this or deep breathing exercises.      Discharge instructions and medications reviewed with patient.  Follow up appointments/imaging/tests needed: 2 week post op with Dr. Martinez on 6/22 at 12:45 p.m.  RN triage/on call number given: [phone]/ [phone]        ASSESSMENT      Discharge Assessment  Patient reports symptoms are: Improved  Does the patient have all of their medications?: Yes  Does patient know what their new medications are for?: Yes  Does patient have a follow-up appointment scheduled?: Yes  Does patient have any other questions or concerns?: Yes(chest xray findings)    Post-op  Did the patient have surgery or a procedure: Yes  Incision: closed;healing  Drainage:  No  Bleeding: none  Fever: No  Chills: No  Redness: No  Warmth: No  Swelling: No  Incision site pain: No  Closure: suture;dissolving  Eating & Drinking: eating and drinking without complaints/concerns  PO Intake: regular diet  Bowel Function: normal  Urinary Status: voiding without complaint/concerns      PLAN                                                      Outpatient Plan:  Routine postop follow-up     Future Appointments   Date Time Provider Department Center   6/22/2021  1:00 PM Andres Martinez MD [facility]           CARLIE MCKEE RN

## 2021-06-20 ENCOUNTER — HEALTH MAINTENANCE LETTER (OUTPATIENT)
Age: 21
End: 2021-06-20

## 2021-06-22 ENCOUNTER — TELEPHONE (OUTPATIENT)
Dept: NEUROSURGERY | Facility: CLINIC | Age: 21
End: 2021-06-22

## 2021-06-22 ENCOUNTER — OFFICE VISIT (OUTPATIENT)
Dept: NEUROSURGERY | Facility: CLINIC | Age: 21
End: 2021-06-22
Payer: COMMERCIAL

## 2021-06-22 VITALS
BODY MASS INDEX: 29.53 KG/M2 | WEIGHT: 173 LBS | SYSTOLIC BLOOD PRESSURE: 122 MMHG | HEART RATE: 107 BPM | DIASTOLIC BLOOD PRESSURE: 85 MMHG | OXYGEN SATURATION: 97 % | HEIGHT: 64 IN | RESPIRATION RATE: 16 BRPM

## 2021-06-22 DIAGNOSIS — F33.2 SEVERE EPISODE OF RECURRENT MAJOR DEPRESSIVE DISORDER, WITHOUT PSYCHOTIC FEATURES (H): ICD-10-CM

## 2021-06-22 DIAGNOSIS — Z96.89 S/P PLACEMENT OF VNS (VAGUS NERVE STIMULATION) DEVICE: Primary | ICD-10-CM

## 2021-06-22 PROCEDURE — 99024 POSTOP FOLLOW-UP VISIT: CPT | Performed by: NEUROLOGICAL SURGERY

## 2021-06-22 RX ORDER — PRENATAL WITH FERROUS FUM AND FOLIC ACID 3080; 920; 120; 400; 22; 1.84; 3; 20; 10; 1; 12; 200; 27; 25; 2 [IU]/1; [IU]/1; MG/1; [IU]/1; MG/1; MG/1; MG/1; MG/1; MG/1; MG/1; UG/1; MG/1; MG/1; MG/1; MG/1
TABLET ORAL
COMMUNITY
Start: 2021-06-15

## 2021-06-22 RX ORDER — CLOBETASOL PROPIONATE 0.5 MG/G
CREAM TOPICAL
COMMUNITY
Start: 2021-04-08

## 2021-06-22 ASSESSMENT — MIFFLIN-ST. JEOR: SCORE: 1524.72

## 2021-06-22 ASSESSMENT — PAIN SCALES - GENERAL: PAINLEVEL: NO PAIN (0)

## 2021-06-22 NOTE — LETTER
6/22/2021       RE: Yoana Webber  [address] [place] [id number]     Dear Colleague,    Thank you for referring your patient, Yoana Webber, to the [facility] [place] [facility]. Please see a copy of my visit note below.    Neurosurgery Clinic Note    Reason for Visit: Postoperative visit    History of Present Illness  Yoana Webber is a 21-year-old woman with a significant history of treatment refractory depression that is now 2 weeks status post VNS implantation.  She is currently at a residential treatment facility and doing okay but struggling generally with her depression.  She has had no real difficulties with her incisions and has not observed any erythema, swelling, significant pain, or drainage.      She denies any fevers, chills, night sweats.         Allergies   Allergen Reactions     Latex Hives     Vicodin [Hydrocodone-Acetaminophen] Other (See Comments), Nausea and Vomiting and Unknown     Severe hallucinations. Patient reports hallucinations        Codeine Other (See Comments), Nausea and Vomiting and Unknown     Pt reports having hallucinations.  Pt reports tolerating Tylenol         Current Outpatient Medications   Medication     albuterol (PROAIR HFA/PROVENTIL HFA/VENTOLIN HFA) 108 (90 Base) MCG/ACT inhaler     ARIPiprazole (ABILIFY) 5 MG tablet     atomoxetine (STRATTERA) 60 MG capsule     Calcium-Vitamin D-Vitamin K 500-100-40 MG-UNT-MCG CHEW     cholecalciferol (VITAMIN D3) 125 mcg (5000 units) capsule     clobetasol (TEMOVATE) 0.05 % CREA cream     clobetasol (TEMOVATE) 0.05 % external cream     clonazePAM (KLONOPIN) 1 MG tablet     desvenlafaxine (PRISTIQ) 100 MG 24 hr tablet     Levonorgestrel-Ethinyl Estrad (VIENVA PO)     memantine (NAMENDA) 5 MG tablet     montelukast (SINGULAIR) 10 MG tablet     ondansetron (ZOFRAN) 4 MG tablet     ondansetron (ZOFRAN-ODT) 8 MG ODT tab     polyethylene glycol  "(MIRALAX) 17 GM/Dose powder     prazosin (MINIPRESS) 2 MG capsule     Prenatal 27-1 MG TABS     traZODone (DESYREL) 100 MG tablet     vitamin  s/Minerals TABS     ziprasidone (GEODON) 60 MG capsule     No current facility-administered medications for this visit.      Facility-Administered Medications Ordered in Other Visits   Medication     heparin 100 UNIT/ML injection 5 mL     heparin lock flush 10 UNIT/ML injection 5 mL     hydrALAZINE (APRESOLINE) injection 10 mg     INFUSION HYPERSENSITIVITY     ketamine (KETALAR) 0.5 mg/kg = 27.5 mg in sodium chloride 0.9 % 50.55 mL intermittent infusion     ondansetron (ZOFRAN) injection 4 mg     sodium chloride (PF) 0.9% PF flush 3-20 mL             Physical Exam  /85   Pulse 107   Resp 16   Ht 1.626 m (5' 4\")   Wt 78.5 kg (173 lb)   SpO2 97%   BMI 29.70 kg/m        General: Awake, alert, oriented. Well nourished, well developed, no acute distress.  Incisions: removed glue. Both appear clean, dry, nearly completely healed.  HEENT: Head normocephalic, atraumatic.   Extremity: Warm with no clubbing or cyanosis. No lower extremity edema.    Neurological  Awake, alert and oriented to date, time, place and person. Speech fluent.   Pupils equal, round, reactive to light.  Extraocular movement intact.  Hearing is grossly normal to finger rub.   Face symmetric.  Tongue midline.  Uvula elevates equally.    Motor: full strength throughout.  Sensation: intact to light touch and pinpoint.    ROS: 10 point ROS neg other than the symptoms noted above in the HPI.        Assessment and Plan   Yoana Webber is a 21 year old female who is now status post vagal nerve stimulator implant with nearly healed incisions and no signs of infection.  I spoke with Dr. Fregoso today and we scheduled an appointment on Friday at noon so that her stimulator can be turned on.  She will continue to follow-up with him for programming.  She can follow-up with me as needed.            Andres" MD Juan  Department of Neurosurgery  [facility]        Again, thank you for allowing me to participate in the care of your patient.      Sincerely,    Andres Martinez MD

## 2021-06-22 NOTE — CONFIDENTIAL NOTE
Per Dr. Martinez, spoke with p to let her know the location of the appt with Dr. Fregoso:    [address] [facility] [address], [address]  [place] [address]    Pt's father says the appt is Friday at 12:00 pm. No further questions at this time.

## 2021-06-22 NOTE — NURSING NOTE
Chief Complaint   Patient presents with     Surgical Followup     [facility] Return - 2 wk post-op     Anjum Liz

## 2021-06-22 NOTE — TELEPHONE ENCOUNTER
Per Dr. Martinez, spoke with pt's father to let him know the location of the appt with Dr. Fregoso:    [address] [facility] [address], [address]  [place] [address]    Pt's father says the appt is Friday at 12:00 pm. No further questions at this time.

## 2021-06-25 ENCOUNTER — OFFICE VISIT (OUTPATIENT)
Dept: PSYCHIATRY | Facility: CLINIC | Age: 21
End: 2021-06-25
Payer: COMMERCIAL

## 2021-06-25 VITALS
SYSTOLIC BLOOD PRESSURE: 112 MMHG | BODY MASS INDEX: 30.59 KG/M2 | WEIGHT: 178.2 LBS | DIASTOLIC BLOOD PRESSURE: 80 MMHG | HEART RATE: 114 BPM | TEMPERATURE: 97.3 F

## 2021-06-25 DIAGNOSIS — F33.2 SEVERE EPISODE OF RECURRENT MAJOR DEPRESSIVE DISORDER, WITHOUT PSYCHOTIC FEATURES (H): ICD-10-CM

## 2021-06-25 DIAGNOSIS — F33.2 SEVERE RECURRENT MAJOR DEPRESSION WITHOUT PSYCHOTIC FEATURES (H): Primary | ICD-10-CM

## 2021-06-25 NOTE — PATIENT INSTRUCTIONS
Yoana Webber was seen today  In the [facility] treatment resistant depression cfor activation of her vagus nerve stimulator.    Activation went well and she experiences a tapping sensation in the back of her throat and a hoarsening of voice during the 30secs the device is active. These are normal an expected     The current settings will activate the device for 30sec at a time  And then take a 5 minute break.    Alarm sypmtoms to be concerned about would be light headedness, dizziness or shortness of breath when device is active. If she does experience any of these symptoms then she can use her wrist magnet held against the chest at  The stimulator and it will block pulses from being delivered until the magnet is removed. Usually, side effects are more intense when lying down as this sometimes can stretch the vagus nerve  sitting up can sometimes be enough to cause relief. If device is disrupting sleep can place the magnet and tape it down for the night.    If she continues to have distress she can call the clinic and we will arrange for her to come in and have her setting adjusted for comfort. Do not make a formal appointment just inform us of when she can  make it and we have multiple staff who can assist for minor adjustment

## 2021-06-25 NOTE — PROGRESS NOTES
"  Psychiatry Clinic Progress Note                                                                   Yoana Webber is a 21 year old female who prefers the name Yoana & pronouns she, her, hers.  Referred by:  Dr. Jes Rey M.D.  Referred for evaluation of:  Depression  History was provided by patient and mother who was a good historian.     Pertinent Background: Hx of chronic suicidality, SIB, and eating disorder. 4 prior suicide attempts. Has had >10 hospitalizations and 1 year of residential treatment (ended December 2019). Previously benefited from ECT but developed delirium. partial ketamine response. Returned to residential treatment 2021, VNS implanted 6/2021    Interim History                                                                                                        4, 4     The patient is a good historian.  Since the last visit she has retunred to residential care to stabilize mood and eating disorder. She is post-recovery of VNS placement for depression and denies site reaction or pain.     Depressed mood. Script :\" nothing you do will ever be good enough, you don't deserve anything good.\" Guilty about not being done with school on schedule. Frequently tearful and experiencing muscle tension and physical fatigue of a weight dragging her down. Sleep is good but takes 3 meds for sleep and uses weighted blanket.  Some visual illusions see someone passing by the doorway. She feels able to fact check with other people.  Anxiety: constant level with the potential to spike . The cognitions include \" People are judging you , people are watching you.\"  Feelings of emptiness . Passive SI sitting in the back of her mind which is her baseline.   This is not the worst ever episode she did get some lift after ketamine treatment.    He mood can respond slightly to positive life events she was excited to see her parents today because visitation rules are still strict at [facility] under COVID " restrictions.    Recent Symptoms:   Depression:  suicidal ideation, depressed mood, low energy, feeling worthless and dysregulation  Anxiety:  excessive worry and social anxiety     Recent Substance Use:  none reported        Social/ Family History                                  [per patient report]                                 1ea,1ea   See HPI    Medical / Surgical History                                                                                                                  Patient Active Problem List   Diagnosis     Severe episode of recurrent major depressive disorder, without psychotic features (H)     Depression     Borderline personality disorder (H)     Anorexia nervosa in remission       Past Surgical History:   Procedure Laterality Date     CHOLECYSTECTOMY       ENT SURGERY      tonsillectomy     IMPLANT STIMULATOR VAGUS NERVE Left 6/11/2021    Procedure: Vagus nerve stimulator placement with placement of generator/battery over left chest wall  **Latex Allergy**;  Surgeon: Andres Martinez MD;  Location: [facility] OR        Medical Review of Systems                                                                                                    2,10   A comprehensive review of systems was performed and is negative other than noted in the HPI.    Allergy                                Latex, Vicodin [hydrocodone-acetaminophen], and Codeine    Current Medications                                                                                                       Current Outpatient Medications   Medication Sig Dispense Refill     albuterol (PROAIR HFA/PROVENTIL HFA/VENTOLIN HFA) 108 (90 Base) MCG/ACT inhaler Inhale 2 puffs into the lungs every 4 hours as needed for shortness of breath / dyspnea or wheezing       ARIPiprazole (ABILIFY) 5 MG tablet Take 5 mg by mouth every morning        atomoxetine (STRATTERA) 60 MG capsule Take 60 mg by mouth every morning        cholecalciferol (VITAMIN D3) 125  mcg (5000 units) capsule        clobetasol (TEMOVATE) 0.05 % CREA cream Apply topically 2 times daily as needed for eczema       clonazePAM (KLONOPIN) 1 MG tablet Take 1 tablet (1 mg) by mouth 2 times daily as needed for anxiety 60 tablet 0     desvenlafaxine (PRISTIQ) 100 MG 24 hr tablet Take 1 tablet (100 mg) by mouth At Bedtime (Patient taking differently: Take 100 mg by mouth every morning ) 30 tablet 1     Levonorgestrel-Ethinyl Estrad (VIENVA PO) Take 1 tablet by mouth At Bedtime       memantine (NAMENDA) 5 MG tablet Take 1 tablet (5 mg) by mouth 2 times daily (Patient taking differently: Take 10 mg by mouth 2 times daily ) 60 tablet 1     montelukast (SINGULAIR) 10 MG tablet Take 10 mg by mouth At Bedtime        ondansetron (ZOFRAN) 4 MG tablet Take 1 tablet (4 mg) by mouth every 8 hours as needed for nausea or vomiting 30 tablet 0     polyethylene glycol (MIRALAX) 17 GM/Dose powder Take 1 capful by mouth 2 times daily as needed        prazosin (MINIPRESS) 2 MG capsule Take 1 capsule (2 mg) by mouth At Bedtime (Patient taking differently: Take 2 mg by mouth At Bedtime Taking 4 mg at bedtime) 30 capsule 1     Prenatal 27-1 MG TABS        traZODone (DESYREL) 100 MG tablet Take 50 mg by mouth At Bedtime        ziprasidone (GEODON) 60 MG capsule Take 60 mg by mouth At Bedtime        Calcium-Vitamin D-Vitamin K 500-100-40 MG-UNT-MCG CHEW        clobetasol (TEMOVATE) 0.05 % external cream APPLY TO THE AFFECTED AREA(S) TWICE DAILY AS NEEDED for eczema       ondansetron (ZOFRAN-ODT) 8 MG ODT tab Take 8 mg by mouth every 8 hours as needed        oxyCODONE (ROXICODONE) 5 MG tablet Take 1 tablet (5 mg) by mouth every 6 hours as needed for pain (Patient not taking: Reported on 6/22/2021) 12 tablet 0     vitamin  s/Minerals TABS Take 1 tablet by mouth daily          Vitals                                                                                                                       3, 3   /80   Pulse 114    Temp 97.3  F (36.3  C) (Temporal)   Wt 80.8 kg (178 lb 3.2 oz)   BMI 30.59 kg/m       Mental Status Exam                                                                                    9, 14 cog gs     Alertness: alert  and oriented  Appearance: well groomed wavy brown hair with blue camouflage mask and casual clothes  Behavior/Demeanor: cooperative and calm, with good  eye contact   Speech: regular rate and rhythm  Language: intact  Psychomotor: slowed  Mood: depressed and anxious  Affect: restricted; was congruent to mood; was congruent to content  Thought Process/Associations: circumstantial  Thought Content:  Reports suicidal ideation;  Denies delusions  Perception:  Reports visual distortion seen as shadows ;  Denies auditory hallucinations  Insight: good  Judgment: good  Cognition: (6) does  appear grossly intact; formal cognitive testing was not done  Gait/Station and/or Muscle Strength/Tone: unremarkable    Labs and Data                                                                                                                 VNS   Diagnostics    3,405 ohms   Battery %       Settings today   1mA  Frequency 20hz  Pulse width 250 microsec  On time : 30 sec  Off time 5 min   Duty 10%      Rating Scales:    N/A    PHQ9 Today:  n/a  PHQ 2/3/2020 9/28/2020 6/8/2021   PHQ-9 Total Score 21 23 21   Q9: Thoughts of better off dead/self-harm past 2 weeks More than half the days Nearly every day Several days         Diagnosis and Assessment                                                                             m2, h3     Today the following issues were addressed:    Major Depressive Disorder  VNS activation    Yoana presented with both parents today with continued chronic severe depression and currently in residential treatment. She recovered well from VNS implantation earlier in the month. VNS activation was performed and she observed detectable throat sensation she described as dull or tickling  and hoarse voice but no dizziness, SOB or sharp pain. She tolerated titration of settings and we reviewed again her imaging restriction rules common side effects and action plan if adverse events occur. She demonstrated understanding of how to use her magnet to disrupt stimulation if needed. Will reassess in 1mo.    [place] Prescription Monitoring Program [] review was not needed today.    Drug Interaction Management: Monitoring for adverse effects    Plan                                                                                                                    m2, h3      1) Major Depressive Disorder  -- Medication:   Continue oral medications  Desvenlafaxine 100mg, Namenda 10mg abilify 5mg, atomoxetine 60mg, Prazosin 4mg, trazodone 100mg Geodon 60mg clonazepam 1mg BID PRN anxiety  -- Psychotherapy: Continue  psychotherapy      RTC: 4wks    CRISIS NUMBERS:   Provided routinely in AVS.    Treatment Risk Statement:  The patient understands the risks, benefits, adverse effects and alternatives. Agrees to treatment with the capacity to do so. No medical contraindications to treatment. Agrees to call clinic for any problems. The patient understands to call 911 or go to the nearest ED if life threatening or urgent symptoms occur.     Narciso Darby, Neuromodulation Fellow     Case was discussed  With Zita Fregoso MD    Physician Attestation   I, Zita Fregoso MD, did not see this patient and agree with the findings and plan of care as documented in the note.        Zita Fregoso MD

## 2021-07-26 ENCOUNTER — OFFICE VISIT (OUTPATIENT)
Dept: PSYCHIATRY | Facility: CLINIC | Age: 21
End: 2021-07-26
Payer: COMMERCIAL

## 2021-07-26 VITALS
DIASTOLIC BLOOD PRESSURE: 87 MMHG | TEMPERATURE: 97.9 F | BODY MASS INDEX: 32.1 KG/M2 | SYSTOLIC BLOOD PRESSURE: 134 MMHG | WEIGHT: 187 LBS | HEART RATE: 125 BPM

## 2021-07-26 DIAGNOSIS — F33.2 SEVERE EPISODE OF RECURRENT MAJOR DEPRESSIVE DISORDER, WITHOUT PSYCHOTIC FEATURES (H): Primary | ICD-10-CM

## 2021-07-26 DIAGNOSIS — F51.5 NIGHTMARES: ICD-10-CM

## 2021-07-26 RX ORDER — SIMETHICONE 125 MG
125 TABLET,CHEWABLE ORAL DAILY PRN
COMMUNITY

## 2021-07-26 ASSESSMENT — PATIENT HEALTH QUESTIONNAIRE - PHQ9: SUM OF ALL RESPONSES TO PHQ QUESTIONS 1-9: 11

## 2021-07-26 NOTE — PROGRESS NOTES
"  Psychiatry Clinic Progress Note                                                                   Yoana Webber is a 21 year old female who prefers the name Yoana & pronouns she, her, hers.  Referred by:  Dr. Jes Rey M.D.  History was provided by patient and mother who was a good historian.     Pertinent Background: Hx of chronic suicidality, SIB(last was cutting on Sept 19, 2020), and eating disorder. 4 prior suicide attempts. Has had >10 hospitalizations and 1 year of residential treatment (ended December 2019). Previously benefited from ECT but developed delirium. partial ketamine response. Returned to residential treatment 2021, VNS implanted 6/2021. Her mood can respond slightly to positive life event    Interim History                                                                                                        4, 4     The patient is a good historian. She comes in with her parents today for second visit after her VNS implantation in June 2021.     She continues to report depressed mood (7-8/10, with 10 being the worst), reporting occasional nightmares (1 or two per month), describing great response and satisfaction with Prazosin treatment, stating that \"they were much worse before I started Prazosin years ago\".  Sleep continues to be good but takes 3 meds for sleep and uses weighted blanket. Passive SI sitting in the back of her mind which is her baseline. No SIB since Sept 19, 2020.     Concerning her eating habits, she states she continues to be in residential treatment at [facility], where she feels significant improvement in her purging habits, as she states the last time she purged was once, one week ago. She also reports that she is glad she is eating 90 % of her meals now, without purging.     Concerning her VNS therapy, she states she is tolerating the apparatus well, stating that her voice trembling is progressively decreasing and becoming more tolerable. She also states " that, if VNS apparatus is turned on and she gets up from a seated/lying position, her heart rate will drop to 60-70 bpm and she would feel lightheadedness and might have syncope. She states this was already reviewed with her cardiologist who is following up on the matter. She also understands that the vagus nerve is part of the system that might drop her heart rate. Otherwise, she denies any other side effects, including referred pain, headaches, itching or keloidal deformation of surgery scars.        Recent Symptoms:   Depression:  suicidal ideation, depressed mood, low energy, feeling worthless and more controelled dysregulation  Anxiety:  mildly less excessive worry and social anxiety     Recent Substance Use:  none reported        Social/ Family History                                  [per patient report]                                 1ea,1ea   See HPI    Medical / Surgical History                                                                                                                  Patient Active Problem List   Diagnosis     Severe episode of recurrent major depressive disorder, without psychotic features (H)     Depression     Borderline personality disorder (H)     Anorexia nervosa in remission       Past Surgical History:   Procedure Laterality Date     CHOLECYSTECTOMY       ENT SURGERY      tonsillectomy     IMPLANT STIMULATOR VAGUS NERVE Left 6/11/2021    Procedure: Vagus nerve stimulator placement with placement of generator/battery over left chest wall  **Latex Allergy**;  Surgeon: Andres Martinez MD;  Location: [facility] OR        Medical Review of Systems                                                                                                    2,10   A comprehensive review of systems was performed and is negative other than noted in the HPI.    Allergy                                Latex, Vicodin [hydrocodone-acetaminophen], and Codeine    Current Medications                                                                                                        Current Outpatient Medications   Medication Sig Dispense Refill     albuterol (PROAIR HFA/PROVENTIL HFA/VENTOLIN HFA) 108 (90 Base) MCG/ACT inhaler Inhale 2 puffs into the lungs every 4 hours as needed for shortness of breath / dyspnea or wheezing       ARIPiprazole (ABILIFY) 5 MG tablet Take 5 mg by mouth every morning Taking 1.5 mg by mouth daily       atomoxetine (STRATTERA) 60 MG capsule Take 60 mg by mouth every morning        cholecalciferol (VITAMIN D3) 125 mcg (5000 units) capsule        clobetasol (TEMOVATE) 0.05 % CREA cream Apply topically 2 times daily as needed for eczema       clonazePAM (KLONOPIN) 1 MG tablet Take 1 tablet (1 mg) by mouth 2 times daily as needed for anxiety (Patient taking differently: Take 1 mg by mouth 2 times daily as needed for anxiety 1 mg at bedtime, and 0.5 mg prn) 60 tablet 0     desvenlafaxine (PRISTIQ) 100 MG 24 hr tablet Take 1 tablet (100 mg) by mouth At Bedtime (Patient taking differently: Take 100 mg by mouth every morning ) 30 tablet 1     Levonorgestrel-Ethinyl Estrad (VIENVA PO) Take 1 tablet by mouth At Bedtime       memantine (NAMENDA) 5 MG tablet Take 1 tablet (5 mg) by mouth 2 times daily (Patient taking differently: Take 10 mg by mouth 2 times daily ) 60 tablet 1     montelukast (SINGULAIR) 10 MG tablet Take 10 mg by mouth At Bedtime        ondansetron (ZOFRAN) 4 MG tablet Take 1 tablet (4 mg) by mouth every 8 hours as needed for nausea or vomiting 30 tablet 0     polyethylene glycol (MIRALAX) 17 GM/Dose powder Take 1 capful by mouth 2 times daily as needed        prazosin (MINIPRESS) 2 MG capsule Take 1 capsule (2 mg) by mouth At Bedtime (Patient taking differently: Take 2 mg by mouth At Bedtime Taking 4 mg at bedtime) 30 capsule 1     Prenatal 27-1 MG TABS        simethicone (MYLICON) 125 MG chewable tablet Take 125 mg by mouth 2 times daily       traZODone (DESYREL) 100 MG tablet Take  50 mg by mouth At Bedtime        ziprasidone (GEODON) 60 MG capsule Take 60 mg by mouth At Bedtime        Calcium-Vitamin D-Vitamin K 500-100-40 MG-UNT-MCG CHEW  (Patient not taking: Reported on 7/26/2021)       clobetasol (TEMOVATE) 0.05 % external cream APPLY TO THE AFFECTED AREA(S) TWICE DAILY AS NEEDED for eczema       ondansetron (ZOFRAN-ODT) 8 MG ODT tab Take 8 mg by mouth every 8 hours as needed        vitamin  s/Minerals TABS Take 1 tablet by mouth daily  (Patient not taking: Reported on 7/26/2021)         Vitals                                                                                                                       3, 3   There were no vitals taken for this visit.     Mental Status Exam                                                                                    9, 14 cog gs     Alertness: alert  and oriented  Appearance: well groomed wavy brown hair, casual clothes. Both VNS scars appear to be healing well without keloidal accumulation.   Behavior/Demeanor: cooperative and calm, with good  eye contact   Speech: regular rate and rhythm  Language: intact  Psychomotor: slowed  Mood: depressed and anxious  Affect: restricted; was partially congruent to mood; was congruent to content  Thought Process/Associations: circumstantial  Thought Content:  Reports suicidal ideation without intent or plan ;  Denies delusions  Perception:  Denies visual/ auditory hallucinations  Insight: good  Judgment: good  Cognition: (6) does  appear grossly intact; formal cognitive testing was not done  Gait/Station and/or Muscle Strength/Tone: unremarkable    Labs and Data                                                                                                                 VNS   Diagnostics    3,405 ohms   Battery %    Settings on 6/25/2021:  1mA  Frequency 20hz  Pulse width 250 microsec  On time : 30 sec  Off time 5 min       Settings today (7/26/2021)  1.5 mA  Frequency 20hz  Pulse width 250  microsec  On time : 30 sec  Off time 5 min         Rating Scales:       N/A    PHQ9 Today:  n/a  PHQ 9/28/2020 6/8/2021 7/26/2021   PHQ-9 Total Score 23 21 11   Q9: Thoughts of better off dead/self-harm past 2 weeks Nearly every day Several days Several days         Diagnosis and Assessment                                                                             m2, h3     Today the following issues were addressed:    Major Depressive Disorder  VNS dosing titration    Yoana presented with both parents today with continued chronic severe depression and currently in residential treatment. She continues to show appropriate recovery from VNS implantation with signs of parasympathetic activation affecting heart rate upon standing from sitting/luing position, currently tolerated and followed up by cardiologist. VNS dosing titration was performed and she observed detectable throat sensation she described as dull or tickling and hoarse voice but no dizziness, SOB or sharp pain. She tolerated titration of settings and we reviewed again her imaging restriction rules, common side effects and action plan if adverse events occur. She demonstrated understanding of how to use her magnet to disrupt stimulation if needed. Will reassess in 2 weeks.    [place] Prescription Monitoring Program [] review was not needed today.    Drug Interaction Management: Monitoring for adverse effects    Plan                                                                                                                    m2, h3      1) Major Depressive Disorder           A.  Medication: Continue oral medications as managed by Dr. Jes Rey  -Desvenlafaxine 100mg for mood  -Namenda 10mg for mood (family history of mother with depression responding to Namenda)  -abilify 5mg for mood  -atomoxetine 60mg for mood and attention  -Prazosin 4mg for nighmtares  -trazodone 50 mg (100 mg half a tab)  -Geodon 60mg for mood  -clonazepam 1mg BID PRN  anxiety            B. Psychotherapy: Continue  Psychotherapy    2) Wound care: Recommended OTC topical Vitamin E to be applied on surgery wounds for better wound care      3) VNS side effects: Signed letter for [facility] to monitor HR upon standing up and report back to our clinic for any abnormalities.                 RTC: 2 wks    CRISIS NUMBERS:   Provided routinely in AVS.    Treatment Risk Statement:  The patient understands the risks, benefits, adverse effects and alternatives. Agrees to treatment with the capacity to do so. No medical contraindications to treatment. Agrees to call clinic for any problems. The patient understands to call 911 or go to the nearest ED if life threatening or urgent symptoms occur.     Provider: Sparkle Mejía MD (neuromodulation fellow)    Case was discussed with Zita Fregoso MD (attending psychiatrist), who was present during the entire interview and examination of the patient and agreed with findings.     Physician Attestation   I, Zita Fregoso MD, saw this patient and agree with the findings and plan of care as documented in the note.          Zita Fregoso MD

## 2021-07-26 NOTE — PATIENT INSTRUCTIONS
"Today's Recommendations:  - To continue oral medications as managed by Dr. Jes Rey  - To continue participation in psychotherapeutic interventions at the [facility] and after discharge  - Wound care: Recommended Vitamin E to be applied on surgery wounds for better wound care - can get over-the-counter and follow recommendations on product  - VNS-related HR drop: Signed letter for [facility] to monitor HR upon standing up and report back to our clinic for any abnormalities.    We are specifically a university and research clinic, and there are often research studies ongoing. Some of those are clinical trials of new brain stimulation treatments. Others are what we would call \"biomarker\" studies, where we ask you to participate in some kind of measurement while you are undergoing regular treatment. You may be called by one of our clinical research coordinators about these studies. If you do not want to be contacted, please let us know. (Being called does not mean you have to be in a study.) In some cases, a clinical trial is the only way to get access to an advanced treatment that is not covered by your insurance. Usually, we will talk about this in your visit if it is an option.    Outside of this specific clinic, you might also be interested in the \"Measurement Based Care\" research study that is being conducted throughout the [facility] Department of Psychiatry and Behavioral Sciences. This provides some additional testing that might be diagnostically helpful, although we are still trying to learn how best to use it.  More information about that is at: [email or web address]    If you are interested in the study you can email, [email or web address].    General Information:  Our Treatment-Resistant Disorders/Interventional Psychiatry clinic is what is often called a \"consultation\" or \"tertiary care\" clinic. That means we do not see patients long-term for " medication management or talk therapy. We offer thorough evaluations, recommendations about medications/therapies you may have not yet tried, and in some cases, brain stimulation or office-based treatments. If you are likely to benefit from one of those advanced treatments, we will have talked about it today. Once that treatment is complete, we will see you once or twice afterwards to check in, and then you will return to getting ongoing psychiatric care from whomever you were seeing before you came for your evaluation with us. If for some reason you no longer have access to that clinician, we can help with a referral to our main [facility]. The only patients we see long-term are patients with implanted medical devices that require ongoing monitoring and programming.     Our recommendations almost always include some kind of cognitive-behavioral therapy (CBT) if you are not getting it already. Brain and nerve stimulation treatments work best when combined with certain talk therapies. We make these recommendations because we strongly believe that, without the therapy piece, most people will not get better, or will have limited clinical benefit. It is often difficult or inconvenient to add therapy to an already busy schedule, but it is also necessary.    It is important to remember that, like all mental health treatments, our interventional therapies are not perfect. About one third of people will not feel better after treatment, and even when they work, they do not take away the symptoms entirely.If we have recommended something above, it means we think there is a better than even chance of it working, but things are never guaranteed. This is another reason we usually recommend CBT along with our advanced treatments -- it can address the symptoms that remain after the stimulation/ketamine treatment.       While we are waiting to implement the recommendations you and I have discussed, you should know  what to do if your symptoms worsen. A variety of crisis numbers/resources are available. They include:    CRISIS GENERAL NUMBERS   6-314-EJAFJQP (1-103.235.1725)  OR  911     CRISIS INTERVENTION TEAM (CIT) - this is a POLICE UNIT, specially trained.  This website has information for all of [place]'s CITs. Lays out which areas have this team.  [email or web address]  However, one can just call 911 and ask for this special team.   If police need to be called, DO ask for this team.    CRISIS MOBILE TEAMS  [and see end of this phrase*]  [facility] -COPE: 24hrs/7days:    [phone]  (Adults > 19yo)    [phone]  (Child   < 18yo)                                       FRONT DOOR (during the day could call)   [phone]    [facility] -[phone] (Adult)  [phone] (Child)     [facility] -[phone] (Adult and Child)     [facility] -[phone] ([employer] mobile crisis team; Adult and Child; 24hr)     [facility] -[phone] (Adult and Child)     CRISIS HOUSING  [facility]                           [address]              [phone]  [facility]                                [address]                       [phone]  [facility]                               [address] [place] [address]     [phone]   [facility]  [address]                 [phone]    [facility] EMERGENCY NUMBERS      Crisis Connection:                                [phone]    [facility]:     [phone]    Crisis Intervention:                                [phone] or [phone]     COPE: Mobile Team 24hrs/7days:    [phone]  (Adults > 19yo)                                                                            [phone]  (Child   < 18yo)  [facility]         "[phone] 24/7 line and Mobile Team   [address] [place] [address]  DROP IN:  M-F: 8:00 am - 7:00 pm  Sat: 11:00 am - 3:00 pm   Sun: Closed     WALK-IN COUNSELING:  [facility]       [phone]    [address]:   M, W, F:  1:00-3:00PM    M-Th:  6:30-8:30PM    TRANS and LGBT  Call [employer] at [phone]. This service is staffed by trans people 24/7.   LGBT youth <24 contemplating suicide, call the [employer] [phone].    POISON CONTROL CENTER  [phone]    OR  go to nearest ER    CHILD  \"[facility] has a needs assessment team who will do an intake evaluation. Based on the results of the intake they will recommended inpatient admission, partial hospitalization, intensive outpatient or outpatient care. The number is [phone]. \"    CRISIS TEXT LINE  Http://www.crisistextline.org  FREE SUPPORT AT YOUR FINGERTIPS, 24/7  Crisis Text Line serves anyone, in any type of crisis, providing access to free, 24/7 support and information via the medium people already use and trust: text. Here s how it works:  1)  Text [phone] from anywhere in the [place], anytime, about any type of crisis.  2)  A live, trained Crisis Counselor receives the text and responds quickly.      The volunteer Crisis Counselor will help you move from a 'hot moment to a cool moment'    [facility] EMERGENCY NUMBERS      Crisis Connection:                                [phone]    [facility]:              [phone]    Crisis Intervention:                                [phone] or [phone]     COPE: Mobile Team 24hrs/7days:    [phone]  (Adults > 17yo)                                                                            [phone]  (Child   < 18yo)  [facility]        [phone]  CALL 24 hours a day.  [address] [place] [address]  DROP IN:  M-F: 8:00 am - 7:00 pm  Sat: 11:00 am - 3:00 pm " "  Sun: Closed    WALK-IN COUNSELIN)  [facility]                                  [phone]        [place], [address]:                  LYNN, W:      5:00-7:00PM       2)  [facility]                            [phone]        [place], [address]                T, Th:      6:00-8:00PM    TRANS and LGBT  Call Trans "[employer]line at [phone]. This service is staffed by trans people .   LGBT youth <24 contemplating suicide, call the [employer] Lifeline [phone].    POISON CONTROL CENTER  [phone]    OR  go to nearest ER    CHILD  \"[facility] has a needs assessment team who will do an intake evaluation. Based on the results of the intake they will recommended inpatient admission, partial hospitalization, intensive outpatient or outpatient care. The number is [phone] or [phone]. \"    CRISIS TEXT LINE  Http://www.crisistextline.org  FREE SUPPORT AT YOUR FINGERTIPS,   Crisis Text Line serves anyone, in any type of crisis, providing access to free,  support and information via the medium people already use and trust: text. Here s how it works:  1)  Text [phone] from anywhere in the [place], anytime, about any type of crisis.  2)  A live, trained Crisis Counselor receives the text and responds quickly.      The volunteer Crisis Counselor will help you move from a 'hot moment to a cool moment'      * A Community Paramedic (CP) is an advanced paramedic that works to increase access to primary and preventive care and decrease use of emergency departments, which in turn decreases health care costs. Among other things, CPs may play a key role in providing follow-up services after a hospital discharge to prevent hospital readmission. CPs can provide health assessments, chronic disease monitoring and education, medication management, immunizations and vaccinations, laboratory specimen collection, hospital discharge follow-up care and minor medical procedures. CPs " work under the direction of an Ambulance Medical Director.

## 2021-08-03 ENCOUNTER — TELEPHONE (OUTPATIENT)
Dept: PSYCHIATRY | Facility: CLINIC | Age: 21
End: 2021-08-03

## 2021-08-03 NOTE — TELEPHONE ENCOUNTER
What is the concern that needs to be addressed by a nurse? Pt called to schedule follow up, was supposed to be seen in 2 weeks from appt on 7/26/21, but Dr. Fregoso is booked until October. Please check if it is okay to book out 3 months or else book outside template and call pt back.     May a detailed message be left on voicemail? yes    Date of last office visit: 7/26/21    Message routed to: psych rn pool

## 2021-08-16 ENCOUNTER — TELEPHONE (OUTPATIENT)
Dept: PSYCHIATRY | Facility: CLINIC | Age: 21
End: 2021-08-16

## 2021-08-16 ENCOUNTER — OFFICE VISIT (OUTPATIENT)
Dept: PSYCHIATRY | Facility: CLINIC | Age: 21
End: 2021-08-16
Payer: COMMERCIAL

## 2021-08-16 VITALS
TEMPERATURE: 97.1 F | WEIGHT: 188.8 LBS | BODY MASS INDEX: 32.41 KG/M2 | HEART RATE: 84 BPM | SYSTOLIC BLOOD PRESSURE: 131 MMHG | DIASTOLIC BLOOD PRESSURE: 86 MMHG

## 2021-08-16 DIAGNOSIS — F33.2 SEVERE EPISODE OF RECURRENT MAJOR DEPRESSIVE DISORDER, WITHOUT PSYCHOTIC FEATURES (H): Primary | ICD-10-CM

## 2021-08-16 RX ORDER — MEMANTINE HYDROCHLORIDE 10 MG/1
10 TABLET ORAL 2 TIMES DAILY
COMMUNITY
Start: 2021-08-13

## 2021-08-16 RX ORDER — ARIPIPRAZOLE 15 MG/1
15 TABLET ORAL DAILY
COMMUNITY
Start: 2021-08-13

## 2021-08-16 NOTE — TELEPHONE ENCOUNTER
What is the concern that needs to be addressed by a nurse? 4 month f/u, appt needs to be after 4pm for pt, outside of Dr. Fregoso's template, please confirm where pt can be scheduled.     May a detailed message be left on voicemail? yes    Date of last office visit: 8/16/21    Message routed to: psych rn pool

## 2021-08-16 NOTE — PROGRESS NOTES
"  Psychiatry Clinic Progress Note                                                                   Yoana Webber is a 21 year old female who prefers the name Yoana & pronouns she, her, hers.  Referred by:  Dr. Jes Rey M.D.  History was provided by patient and mother who was a good historian.     Pertinent Background: Hx of chronic suicidality, SIB(last was cutting on Sept 19, 2020), and eating disorder. 4 prior suicide attempts. Has had >10 hospitalizations and 1 year of residential treatment (ended December 2019). Previously benefited from ECT but developed delirium. partial ketamine response. Returned to residential treatment 2021, VNS implanted 6/2021. Her mood can respond slightly to positive life event    Interim History                                                                                                        4, 4       Since last visit:   - She states that she was discharged from her residential treatment as she is doing better and has already spent around 2 months there  - She continues to have depressed mood with passive SI unchanged form last visit but tolerable without intent or plan   - She feels overall she is tolerating VNS apparatus, stating that her voice trembling is very tolerable, and reporting that her cardiovascular symptoms shared last visit are \"all gone\", as she reports she received a great amount of IV fluids, which resolved her orthostatic dizziness and heart rate irregularities. She also denies any jaw or neck pain.   - She also states that at no occasion she felt the need to turn off her VNS using the magnet.     - She agrees for us to increase her VNS intensity today and follow up in 5-6 months (or sooner if any specific complaints rise up) on her depressive symptoms response.     From previous visit:   The patient is a good historian. She comes in with her parents today for second visit after her VNS implantation in June 2021.     She continues to report depressed " "mood (7-8/10, with 10 being the worst), reporting occasional nightmares (1 or two per month), describing great response and satisfaction with Prazosin treatment, stating that \"they were much worse before I started Prazosin years ago\".  Sleep continues to be good but takes 3 meds for sleep and uses weighted blanket. Passive SI sitting in the back of her mind which is her baseline. No SIB since Sept 19, 2020.     Concerning her eating habits, she states she continues to be in residential treatment at [facility], where she feels significant improvement in her purging habits, as she states the last time she purged was once, one week ago. She also reports that she is glad she is eating 90 % of her meals now, without purging.     Concerning her VNS therapy, she states she is tolerating the apparatus well, stating that her voice trembling is progressively decreasing and becoming more tolerable. She also states that, if VNS apparatus is turned on and she gets up from a seated/lying position, her heart rate will drop to 60-70 bpm and she would feel lightheadedness and might have syncope. She states this was already reviewed with her cardiologist who is following up on the matter. She also understands that the vagus nerve is part of the system that might drop her heart rate. Otherwise, she denies any other side effects, including referred pain, headaches, itching or keloidal deformation of surgery scars.        Recent Symptoms:   Depression:  suicidal ideation, depressed mood, low energy, feeling worthless and more controelled dysregulation  Anxiety:  mildly less excessive worry and social anxiety     Recent Substance Use:  none reported        Social/ Family History                                  [per patient report]                                 1ea,1ea   See HPI    Medical / Surgical History                                                                                                                  Patient " Active Problem List   Diagnosis     Severe episode of recurrent major depressive disorder, without psychotic features (H)     Depression     Borderline personality disorder (H)     Anorexia nervosa in remission       Past Surgical History:   Procedure Laterality Date     CHOLECYSTECTOMY       ENT SURGERY      tonsillectomy     IMPLANT STIMULATOR VAGUS NERVE Left 6/11/2021    Procedure: Vagus nerve stimulator placement with placement of generator/battery over left chest wall  **Latex Allergy**;  Surgeon: Andres Martinez MD;  Location: [facility] OR        Medical Review of Systems                                                                                                    2,10   A comprehensive review of systems was performed and is negative other than noted in the HPI.    Allergy                                Latex, Vicodin [hydrocodone-acetaminophen], and Codeine    Current Medications                                                                                                       Current Outpatient Medications   Medication Sig Dispense Refill     albuterol (PROAIR HFA/PROVENTIL HFA/VENTOLIN HFA) 108 (90 Base) MCG/ACT inhaler Inhale 2 puffs into the lungs every 4 hours as needed for shortness of breath / dyspnea or wheezing       ARIPiprazole (ABILIFY) 5 MG tablet Take 5 mg by mouth every morning Taking 1.5 mg by mouth daily       atomoxetine (STRATTERA) 60 MG capsule Take 60 mg by mouth every morning        cholecalciferol (VITAMIN D3) 125 mcg (5000 units) capsule        clobetasol (TEMOVATE) 0.05 % CREA cream Apply topically 2 times daily as needed for eczema       clonazePAM (KLONOPIN) 1 MG tablet Take 1 tablet (1 mg) by mouth 2 times daily as needed for anxiety (Patient taking differently: Take 1 mg by mouth 2 times daily as needed for anxiety 1 mg at bedtime, and 0.5 mg prn) 60 tablet 0     desvenlafaxine (PRISTIQ) 100 MG 24 hr tablet Take 1 tablet (100 mg) by mouth At Bedtime (Patient taking differently:  Take 100 mg by mouth every morning ) 30 tablet 1     Levonorgestrel-Ethinyl Estrad (VIENVA PO) Take 1 tablet by mouth At Bedtime       memantine (NAMENDA) 5 MG tablet Take 1 tablet (5 mg) by mouth 2 times daily (Patient taking differently: Take 10 mg by mouth 2 times daily ) 60 tablet 1     montelukast (SINGULAIR) 10 MG tablet Take 10 mg by mouth At Bedtime        ondansetron (ZOFRAN) 4 MG tablet Take 1 tablet (4 mg) by mouth every 8 hours as needed for nausea or vomiting 30 tablet 0     polyethylene glycol (MIRALAX) 17 GM/Dose powder Take 1 capful by mouth 2 times daily as needed        prazosin (MINIPRESS) 2 MG capsule Take 1 capsule (2 mg) by mouth At Bedtime (Patient taking differently: Take 2 mg by mouth At Bedtime Taking 4 mg at bedtime) 30 capsule 1     Prenatal 27-1 MG TABS        simethicone (MYLICON) 125 MG chewable tablet Take 125 mg by mouth 2 times daily       traZODone (DESYREL) 100 MG tablet Take 50 mg by mouth At Bedtime        ziprasidone (GEODON) 60 MG capsule Take 60 mg by mouth At Bedtime        Calcium-Vitamin D-Vitamin K 500-100-40 MG-UNT-MCG CHEW  (Patient not taking: Reported on 7/26/2021)       clobetasol (TEMOVATE) 0.05 % external cream APPLY TO THE AFFECTED AREA(S) TWICE DAILY AS NEEDED for eczema       ondansetron (ZOFRAN-ODT) 8 MG ODT tab Take 8 mg by mouth every 8 hours as needed        vitamin  s/Minerals TABS Take 1 tablet by mouth daily  (Patient not taking: Reported on 7/26/2021)         Vitals                                                                                                                       3, 3   /86   Pulse 84   Temp 97.1  F (36.2  C) (Temporal)   Wt 85.6 kg (188 lb 12.8 oz)   BMI 32.41 kg/m       Mental Status Exam                                                                                    9, 14 cog gs     Alertness: alert  and oriented  Appearance: well groomed, light brown hair, casual clothes. Both VNS scars appear to be healing well  without keloidal accumulation.   Behavior/Demeanor: cooperative and calm, with good  eye contact   Speech: regular rate and rhythm  Language: intact  Psychomotor: slowed  Mood: depressed and anxious  Affect: less restricted, smiling more than previous visit, was partially congruent to mood; was congruent to content  Thought Process/Associations: comprehensive, logical   Thought Content:  Reports suicidal ideation without intent or plan ;  Denies delusions  Perception:  Denies visual/ auditory hallucinations  Insight: good  Judgment: good  Cognition: (6) does  appear grossly intact; formal cognitive testing was not done  Gait/Station and/or Muscle Strength/Tone: unremarkable    Labs and Data                                                                                                                 VNS   Diagnostics    3,405 ohms   Battery %    Settings on 6/25/2021:  1mA  Frequency 20hz  Pulse width 250 microsec  On time : 30 sec  Off time 5 min     Settings on 7/26/2021  1.5 mA  Frequency 20hz  Pulse width 250 microsec  On time : 30 sec  Off time 5 min     Settings today (8/16/2021)  1.75 mA - tolerated increase from 1.5 mA with minor and tolerable tingling felt on inferior left side of mentum  Frequency 20hz  Pulse width 250 microsec  On time : 30 sec  Off time 5 min       Rating Scales:       N/A    PHQ9 Today:  n/a  PHQ 9/28/2020 6/8/2021 7/26/2021 8/16/2021   PHQ-9 Total Score 23 21 11    Q9: Thoughts of better off dead/self-harm past 2 weeks Nearly every day Several days Several days          Diagnosis and Assessment                                                                             m2, h3     Today the following issues were addressed:    Major Depressive Disorder  VNS dosing titration    Yoana presented without her parents today with continued chronic severe depression and currently out of residential treatment. She continues to show appropriate recovery from VNS implantation with toelrated  and resolved signs of parasympathetic cardiac activation. VNS dosing titration was performed and she observed no detectable throat sensation change from baseline VNS treatment effect as she described some tingling in left mentum area and hoarse voice but no dizziness, SOB or sharp pain. She tolerated titration of settings and we reviewed again her imaging restriction rules, common side effects and action plan if adverse events occur. She demonstrated understanding of how to use her magnet to disrupt stimulation if needed. Will reassess in 6 months or sooner if any complaints arise.    [place] Prescription Monitoring Program [] review was not needed today.    Drug Interaction Management: Monitoring for adverse effects    Plan                                                                                                                    m2, h3      1) Major Depressive Disorder           A.  Medication: Continue oral medications as managed by Dr. Jes Rey  -Desvenlafaxine 100mg for mood  -Namenda 10mg for mood (family history of mother with depression responding to Namenda)  -abilify 5mg for mood  -atomoxetine 60mg for mood and attention  -Prazosin 4mg for nighmtares  -trazodone 50 mg (100 mg half a tab)  -Geodon 60mg for mood  -clonazepam 1mg BID PRN anxiety            B. Psychotherapy: Continue  Psychotherapy    2) Wound care: Recommended OTC topical Vitamin E to be applied on surgery wounds for better wound care      3) VNS    - side effects: tolerable with resolution of cardiac side effects discussed last visit.    - Follow-up: agrees that we are at a stable titration dose and ready to enter the depressive symptoms-follow-up phase, where she agrees to report back in 6 months for any response      RTC: 6 months     CRISIS NUMBERS:   Provided routinely in AVS.    Treatment Risk Statement:  The patient understands the risks, benefits, adverse effects and alternatives. Agrees to treatment with the capacity to do  so. No medical contraindications to treatment. Agrees to call clinic for any problems. The patient understands to call 911 or go to the nearest ED if life threatening or urgent symptoms occur.     Provider: Sparkle Mejía MD (neuromodulation fellow)    Case was discussed with Zita Fregoso MD (attending psychiatrist), who was present during the entire interview and examination of the patient and agreed with findings.     Physician Attestation   I, Zita Fregoso MD, saw this patient and agree with the findings and plan of care as documented in the note.        Zita Fregoso MD

## 2021-08-16 NOTE — PATIENT INSTRUCTIONS
"Today's Recommendations:  - We are glad your cardiac symptoms have resolved with fluid administration  - We also ar glad you could tolerable an increase from 1.5 to 1.75 mA intensity with minor side effects that we predict to be tolerated over time   - We also agreed for you to follow up with us in 6 months to monitor your response. To note, if any complaints come up, please feel free to send a message via [employer] or schedule an earlier follow-up appointment   - To continue oral medications as managed by Dr. Jes Rey  - To continue participation in psychotherapeutic interventions after discharge from the [facility]   - Wound care: Recommended Vitamin E to be applied on surgery wounds for better wound care - can get over-the-counter and follow recommendations on product    We are specifically a university and research clinic, and there are often research studies ongoing. Some of those are clinical trials of new brain stimulation treatments. Others are what we would call \"biomarker\" studies, where we ask you to participate in some kind of measurement while you are undergoing regular treatment. You may be called by one of our clinical research coordinators about these studies. If you do not want to be contacted, please let us know. (Being called does not mean you have to be in a study.) In some cases, a clinical trial is the only way to get access to an advanced treatment that is not covered by your insurance. Usually, we will talk about this in your visit if it is an option.    Outside of this specific clinic, you might also be interested in the \"Measurement Based Care\" research study that is being conducted throughout the [facility] Department of Psychiatry and Behavioral Sciences. This provides some additional testing that might be diagnostically helpful, although we are still trying to learn how best to use it.  More information about that is at: " "[email or web address]    If you are interested in the study you can email, [email or web address].    General Information:  Our Treatment-Resistant Disorders/Interventional Psychiatry clinic is what is often called a \"consultation\" or \"tertiary care\" clinic. That means we do not see patients long-term for medication management or talk therapy. We offer thorough evaluations, recommendations about medications/therapies you may have not yet tried, and in some cases, brain stimulation or office-based treatments. If you are likely to benefit from one of those advanced treatments, we will have talked about it today. Once that treatment is complete, we will see you once or twice afterwards to check in, and then you will return to getting ongoing psychiatric care from whomever you were seeing before you came for your evaluation with us. If for some reason you no longer have access to that clinician, we can help with a referral to our main [facility]. The only patients we see long-term are patients with implanted medical devices that require ongoing monitoring and programming.     Our recommendations almost always include some kind of cognitive-behavioral therapy (CBT) if you are not getting it already. Brain and nerve stimulation treatments work best when combined with certain talk therapies. We make these recommendations because we strongly believe that, without the therapy piece, most people will not get better, or will have limited clinical benefit. It is often difficult or inconvenient to add therapy to an already busy schedule, but it is also necessary.    It is important to remember that, like all mental health treatments, our interventional therapies are not perfect. About one third of people will not feel better after treatment, and even when they work, they do not take away the symptoms entirely.If we have recommended something above, it means " we think there is a better than even chance of it working, but things are never guaranteed. This is another reason we usually recommend CBT along with our advanced treatments -- it can address the symptoms that remain after the stimulation/ketamine treatment.       While we are waiting to implement the recommendations you and I have discussed, you should know what to do if your symptoms worsen. A variety of crisis numbers/resources are available. They include:    CRISIS GENERAL NUMBERS   3-006-KMAOTHL ([phone])  OR  911     CRISIS INTERVENTION TEAM (CIT) - this is a POLICE UNIT, specially trained.  This website has information for all of [place]'s CITs. Lays out which areas have this team.  [email or web address]  However, one can just call 911 and ask for this special team.   If police need to be called, DO ask for this team.    CRISIS MOBILE TEAMS  [and see end of this phrase*]  [facility] -COPE: 24hrs/7days:    [phone]  (Adults > 19yo)    [phone]  (Child   < 18yo)                                       FRONT DOOR (during the day could call)   [phone]    [facility] -[phone] (Adult)  [phone] (Child)     [facility] -[phone] (Adult and Child)     [facility] -[phone] ([employer] mobile crisis team; Adult and Child; 24hr)     [facility] -[phone] (Adult and Child)     CRISIS HOUSING  [facility]                           [address]              [phone]  [facility]                                [address]                       [phone]  [facility]                               [address]     [phone]   [facility]  [address]                [phone]    [place] EMERGENCY NUMBERS      Crisis Connection:                                 "[phone]    [employer]:     [phone]    Crisis Intervention:                                [phone] or [phone]     COPE: Mobile Team 24hrs/7days:    [phone]  (Adults > 17yo)                                                                            [phone]  (Child   < 18yo)  [facility]        [phone] 24/7 line and Mobile Team   [address] [place] [address]  DROP IN:  M-F: 8:00 am - 7:00 pm  Sat: 11:00 am - 3:00 pm   Sun: Closed     WALK-IN COUNSELING:  [facility]       [phone]    [address]:   M, W, F:  1:00-3:00PM    M-Th:  6:30-8:30PM    TRANS and LGBT  Call [facility] MD at [phone]. This service is staffed by trans people 24/7.   LGBT youth <24 contemplating suicide, call the [facility] [phone].    POISON CONTROL CENTER  [phone]    OR  go to nearest ER    CHILD  \"[facility] has a needs assessment team who will do an intake evaluation. Based on the results of the intake they will recommended inpatient admission, partial hospitalization, intensive outpatient or outpatient care. The number is [phone]. \"    CRISIS TEXT LINE  Http://www.crisistextline.org  FREE SUPPORT AT YOUR FINGERTIPS, 24/7  Crisis Text Line serves anyone, in any type of crisis, providing access to free, 24/7 support and information via the medium people already use and trust: text. Here s how it works:  1)  Text [phone] from anywhere in the [place], anytime, about any type of crisis.  2)  A live, trained Crisis Counselor receives the text and responds quickly.      The volunteer Crisis Counselor will help you move from a 'hot moment to a cool moment'    [facility] [place] EMERGENCY NUMBERS      Crisis Connection:                                [phone]    [facility]:              [phone]    Crisis Intervention:                                [phone] or " "[phone]     COPE: Mobile Team 24hrs/7days:    [phone]  (Adults > 19yo)                                                                            [phone]  (Child   < 16yo)  [facility]        [phone]  CALL 24 hours a day.  [address] [place] [address]  DROP IN:  M-F: 8:00 am - 7:00 pm  Sat: 11:00 am - 3:00 pm   Sun: Closed    WALK-IN COUNSELIN)  [facility]                                  [phone]        [place], [address]:                  M, W:      5:00-7:00PM       2)  [facility]                            [phone]        [place], [address]                T, Th:      6:00-8:00PM    TRANS and LGBT  Call [employer] at [phone]. This service is staffed by trans people .   LGBT youth <24 contemplating suicide, call the [employer] [phone].    POISON CONTROL CENTER  [phone]    OR  go to nearest ER    CHILD  \"[facility] has a needs assessment team who will do an intake evaluation. Based on the results of the intake they will recommended inpatient admission, partial hospitalization, intensive outpatient or outpatient care. The number is [phone] or [phone]. \"    CRISIS TEXT LINE  Http://www.crisistextline.org  FREE SUPPORT AT YOUR FINGERTIPS,   Crisis Text Line serves anyone, in any type of crisis, providing access to free,  support and information via the medium people already use and trust: text. Here s how it works:  1)  Text [phone] from anywhere in the [place], anytime, about any type of crisis.  2)  A live, trained Crisis Counselor receives the text and responds quickly.      The volunteer Crisis Counselor will help you move from a 'hot moment to a cool moment'      * A Community Paramedic (CP) is an advanced paramedic that works to increase access to primary and preventive care and decrease use of emergency departments, which in turn decreases health care " costs. Among other things, CPs may play a key role in providing follow-up services after a hospital discharge to prevent hospital readmission. CPs can provide health assessments, chronic disease monitoring and education, medication management, immunizations and vaccinations, laboratory specimen collection, hospital discharge follow-up care and minor medical procedures. CPs work under the direction of an Ambulance Medical Director.

## 2021-09-17 ENCOUNTER — LAB REQUISITION (OUTPATIENT)
Dept: LAB | Age: 21
End: 2021-09-17

## 2021-09-17 DIAGNOSIS — Z00.8 ENCOUNTER FOR OTHER GENERAL EXAMINATION: ICD-10-CM

## 2021-09-17 PROCEDURE — PSEU9005 URINE, BACTERIAL CULTURE: Performed by: CLINICAL MEDICAL LABORATORY

## 2021-09-17 PROCEDURE — 87186 SC STD MICRODIL/AGAR DIL: CPT | Performed by: CLINICAL MEDICAL LABORATORY

## 2021-09-17 PROCEDURE — 87088 URINE BACTERIA CULTURE: CPT | Performed by: CLINICAL MEDICAL LABORATORY

## 2021-09-17 PROCEDURE — 87086 URINE CULTURE/COLONY COUNT: CPT | Performed by: CLINICAL MEDICAL LABORATORY

## 2021-09-20 LAB — BACTERIA UR CULT: ABNORMAL

## 2021-10-10 ENCOUNTER — HEALTH MAINTENANCE LETTER (OUTPATIENT)
Age: 21
End: 2021-10-10

## 2021-12-20 ENCOUNTER — OFFICE VISIT (OUTPATIENT)
Dept: PSYCHIATRY | Facility: CLINIC | Age: 21
End: 2021-12-20
Payer: COMMERCIAL

## 2021-12-20 VITALS
WEIGHT: 207.8 LBS | TEMPERATURE: 96.9 F | DIASTOLIC BLOOD PRESSURE: 96 MMHG | SYSTOLIC BLOOD PRESSURE: 143 MMHG | HEART RATE: 93 BPM | BODY MASS INDEX: 35.67 KG/M2

## 2021-12-20 DIAGNOSIS — F33.2 SEVERE EPISODE OF RECURRENT MAJOR DEPRESSIVE DISORDER, WITHOUT PSYCHOTIC FEATURES (H): Primary | ICD-10-CM

## 2021-12-20 RX ORDER — GABAPENTIN 400 MG/1
400 CAPSULE ORAL 2 TIMES DAILY
COMMUNITY
Start: 2021-11-17

## 2021-12-20 NOTE — PROGRESS NOTES
Psychiatry Clinic Progress Note                                                                   Yoana Webber is a 21 year old female who prefers the name Yoana & pronouns she, her, hers.  Referred by:  Dr. Jes Rey M.D. [place]ce[place] [facility] [place] [facility]  History was provided by patient and mother who was a good historian.     Pertinent Background: Hx of chronic suicidality, SIB(last was cutting on Sept 19, 2020), and eating disorder. 4 prior suicide attempts. Has had >10 hospitalizations and 1 year of residential treatment (ended December 2019). Previously benefited from ECT but developed delirium. partial ketamine response. Returned to residential treatment 2021, VNS implanted 6/2021. Her mood can respond slightly to positive life event    Interim History                                                                                                        4, 4   Since last visit:   -In early December overdosed on ~100 Tylenol, resulted in hospitalization and acute liver failure, considered for transfer to [facility] for evaluation of liver transplant, recovered.   -No SIB  -Following hospitalization, increased ketamine through [place] [facility] and since then suicidal ideation has significantly improved.  -Overall tolerating VNS, denies adverse effects today.   -Occasionally, device will turn 90 degrees, when she lays on her side. However, is able to turn device back without difficulty.  -Denies suicidal plan or intent.    Recent Symptoms:   Depression:  suicidal ideation, depressed mood, low energy, feeling worthless and more controlled dysregulation  Anxiety:  mildly less excessive worry and social anxiety     Recent Substance Use:  none reported        Social/ Family History                                  [per patient report]                                 1ea,1ea   See HPI    Medical / Surgical History                                                                                                                   Past Medical History:   Diagnosis Date     Anxiety      Depressive disorder      OCD (obsessive compulsive disorder)      PTSD (post-traumatic stress disorder)      Past Surgical History:   Procedure Laterality Date     CHOLECYSTECTOMY       ENT SURGERY      tonsillectomy     IMPLANT STIMULATOR VAGUS NERVE Left 6/11/2021    Procedure: Vagus nerve stimulator placement with placement of generator/battery over left chest wall  **Latex Allergy**;  Surgeon: Andres Martinez MD;  Location: [facility] OR       Medical Review of Systems                                                                                                    2,10   A comprehensive review of systems was performed and is negative other than noted in the HPI.    Allergy                                Latex, Vicodin [hydrocodone-acetaminophen], and Codeine    Current Medications                                                                                                       Current Outpatient Medications:      albuterol (PROAIR HFA/PROVENTIL HFA/VENTOLIN HFA) 108 (90 Base) MCG/ACT inhaler, Inhale 2 puffs into the lungs every 4 hours as needed for shortness of breath / dyspnea or wheezing, Disp: , Rfl:      ARIPiprazole (ABILIFY) 15 MG tablet, TAKE 1/2 TABLET BY MOUTH DAILY FOR 30 DAYS, Disp: , Rfl:      atomoxetine (STRATTERA) 60 MG capsule, Take 60 mg by mouth every morning , Disp: , Rfl:      clobetasol (TEMOVATE) 0.05 % CREA cream, Apply topically 2 times daily as needed for eczema, Disp: , Rfl:      clobetasol (TEMOVATE) 0.05 % external cream, APPLY TO THE AFFECTED AREA(S) TWICE DAILY AS NEEDED for eczema, Disp: , Rfl:      clonazePAM (KLONOPIN) 1 MG tablet, Take 1 tablet (1 mg) by mouth 2 times daily as needed for anxiety (Patient taking differently: Take 1 mg by mouth 2 times daily as needed for anxiety 1 mg at bedtime, and 0.5 mg prn), Disp: 60 tablet, Rfl: 0     desvenlafaxine (PRISTIQ) 100 MG 24 hr tablet,  Take 1 tablet (100 mg) by mouth At Bedtime (Patient taking differently: Take 100 mg by mouth every morning ), Disp: 30 tablet, Rfl: 1     Levonorgestrel-Ethinyl Estrad (VIENVA PO), Take 1 tablet by mouth At Bedtime, Disp: , Rfl:      memantine (NAMENDA) 10 MG tablet, TAKE 1 TABLET BY MOUTH DAILY FOR 30 DAYS, Disp: , Rfl:      montelukast (SINGULAIR) 10 MG tablet, Take 10 mg by mouth At Bedtime , Disp: , Rfl:      ondansetron (ZOFRAN) 4 MG tablet, Take 1 tablet (4 mg) by mouth every 8 hours as needed for nausea or vomiting, Disp: 30 tablet, Rfl: 0     polyethylene glycol (MIRALAX) 17 GM/Dose powder, Take 1 capful by mouth 2 times daily as needed , Disp: , Rfl:      prazosin (MINIPRESS) 2 MG capsule, Take 1 capsule (2 mg) by mouth At Bedtime (Patient taking differently: Take 2 mg by mouth At Bedtime Taking 4 mg at bedtime), Disp: 30 capsule, Rfl: 1     Prenatal 27-1 MG TABS, , Disp: , Rfl:      simethicone (MYLICON) 125 MG chewable tablet, Take 125 mg by mouth 2 times daily, Disp: , Rfl:      traZODone (DESYREL) 100 MG tablet, Take 50 mg by mouth At Bedtime , Disp: , Rfl:      ziprasidone (GEODON) 60 MG capsule, Take 60 mg by mouth At Bedtime , Disp: , Rfl:   No current facility-administered medications for this visit.    Facility-Administered Medications Ordered in Other Visits:      hydrALAZINE (APRESOLINE) injection 10 mg, 10 mg, Intravenous, Once PRN, Zita Fregoso MD     INFUSION HYPERSENSITIVITY, , Does not apply, Continuous PRN, Zita Fregoso MD     ketamine (KETALAR) 0.5 mg/kg = 27.5 mg in sodium chloride 0.9 % 50.55 mL intermittent infusion, 0.5 mg/kg (Ideal), Intravenous, Once, Zita Fregoso MD     sodium chloride (PF) 0.9% PF flush 3-20 mL, 3-20 mL, Intracatheter, Q1H PRN, Zita Fregoso MD        Vitals                                                                                                                       3, 3   There were no vitals taken for this visit.     Mental Status Exam                                                                                     9, 14 cog gs     Alertness: alert  and oriented  Appearance: well groomed, light brown hair, casual clothes. Both VNS scars appear to be healing well without keloidal accumulation.   Behavior/Demeanor: cooperative and calm, with good  eye contact   Speech: regular rate and rhythm  Language: intact  Psychomotor: slowed  Mood: depressed and anxious  Affect: less restricted, smiling more than previous visit, was partially congruent to mood; was congruent to content  Thought Process/Associations: comprehensive, logical   Thought Content:  Reports suicidal ideation without intent or plan ;  Denies delusions  Perception: Reports: none Denies visual/ auditory hallucinations  Insight: good  Judgment: good  Cognition: (6) does  appear grossly intact; formal cognitive testing was not done  Gait/Station and/or Muscle Strength/Tone: unremarkable    Labs and Data                                                                                                                 VNS   Diagnostics    3,405 ohms   Battery %    Settings on 6/25/2021:  1mA  Frequency 20hz  Pulse width 250 microsec  On time : 30 sec  Off time 5 min     Settings on 7/26/2021  1.5 mA  Frequency 20hz  Pulse width 250 microsec  On time : 30 sec  Off time 5 min     Settings on 8/16/2021  1.75 mA - tolerated increase from 1.5 mA with minor and tolerable tingling felt on inferior left side of mentum  Frequency 20hz  Pulse width 250 microsec  On time : 30 sec  Off time 5 min     Settings today (12/20/2021)  1.75 mA - tolerated increase from 1.5 mA with minor and tolerable tingling felt on inferior left side of mentum  Frequency 20hz  Pulse width 250 microsec  On time : 30 sec  Off time 5 min     Rating Scales:       N/A    PHQ9 Today:  12  PHQ 9/28/2020 6/8/2021 7/26/2021   PHQ-9 Total Score 23 21 11   Q9: Thoughts of better off dead/self-harm past 2 weeks Nearly every day Several days Several  days       Diagnosis and Assessment                                                                             m2, h3     Today the following issues were addressed:    Major Depressive Disorder  VNS dosing titration    Yoana presented without her parents today with continued chronic severe depression following suicide attempt in early December on acetaminophen. Since then has increased ketamine with [place] [facility] and feels like suicidal ideation has significantly improved. VNS device was interrogated dose was not adjusted as currently on appropriate settings. Will reassess in 3 months or sooner if any complaints arise.    [place] Prescription Monitoring Program [] review was not needed today.    Drug Interaction Management: Monitoring for adverse effects    Plan                                                                                                                    m2, h3      1) Major Depressive Disorder           A.  Medication: Continue oral medications as managed by Dr. Jes Rey and ketamine per [place] [facility]            B. Psychotherapy: Continue  Psychotherapy    2) VNS    - side effects: tolerable with resolution of cardiac side effects discussed last visit.    - Follow-up: agrees that we are at a stable titration dose and ready to enter the depressive symptoms-follow-up phase, where she agrees to report back in 3 months for any response      RTC: 3 months     CRISIS NUMBERS:   Provided routinely in AVS.    Treatment Risk Statement:  The patient understands the risks, benefits, adverse effects and alternatives. Agrees to treatment with the capacity to do so. No medical contraindications to treatment. Agrees to call clinic for any problems. The patient understands to call 911 or go to the nearest ED if life threatening or urgent symptoms occur.     Provider: Serafin Zamarripa MD    Case was discussed with Zita Fregoso MD (attending psychiatrist), who was present during the  entire interview and examination of the patient and agreed with findings.         Physician Attestation   I, Zita Fregoso MD, saw this patient and agree with the findings and plan of care as documented in the note.

## 2021-12-20 NOTE — PATIENT INSTRUCTIONS
"Today's Recommendations:  - Follow-up in 3 months    We are specifically a university and research clinic, and there are often research studies ongoing. Some of those are clinical trials of new brain stimulation treatments. Others are what we would call \"biomarker\" studies, where we ask you to participate in some kind of measurement while you are undergoing regular treatment. You may be called by one of our clinical research coordinators about these studies. If you do not want to be contacted, please let us know. (Being called does not mean you have to be in a study.) In some cases, a clinical trial is the only way to get access to an advanced treatment that is not covered by your insurance. Usually, we will talk about this in your visit if it is an option.    Outside of this specific clinic, you might also be interested in the \"Measurement Based Care\" research study that is being conducted throughout the [facility] Department of Psychiatry and Behavioral Sciences. This provides some additional testing that might be diagnostically helpful, although we are still trying to learn how best to use it.  More information about that is at: [email or web address]    If you are interested in the study you can email, [email or web address].    General Information:  Our Treatment-Resistant Disorders/Interventional Psychiatry clinic is what is often called a \"consultation\" or \"tertiary care\" clinic. That means we do not see patients long-term for medication management or talk therapy. We offer thorough evaluations, recommendations about medications/therapies you may have not yet tried, and in some cases, brain stimulation or office-based treatments. If you are likely to benefit from one of those advanced treatments, we will have talked about it today. Once that treatment is complete, we will see you once or twice afterwards to check in, and then you will return to getting ongoing " psychiatric care from whomever you were seeing before you came for your evaluation with us. If for some reason you no longer have access to that clinician, we can help with a referral to our main [facility]. The only patients we see long-term are patients with implanted medical devices that require ongoing monitoring and programming.     Our recommendations almost always include some kind of cognitive-behavioral therapy (CBT) if you are not getting it already. Brain and nerve stimulation treatments work best when combined with certain talk therapies. We make these recommendations because we strongly believe that, without the therapy piece, most people will not get better, or will have limited clinical benefit. It is often difficult or inconvenient to add therapy to an already busy schedule, but it is also necessary.    It is important to remember that, like all mental health treatments, our interventional therapies are not perfect. About one third of people will not feel better after treatment, and even when they work, they do not take away the symptoms entirely.If we have recommended something above, it means we think there is a better than even chance of it working, but things are never guaranteed. This is another reason we usually recommend CBT along with our advanced treatments -- it can address the symptoms that remain after the stimulation/ketamine treatment.       While we are waiting to implement the recommendations you and I have discussed, you should know what to do if your symptoms worsen. A variety of crisis numbers/resources are available. They include:    CRISIS GENERAL NUMBERS   5-851-ISGQUHX (1-798.484.9201)  OR  911     CRISIS INTERVENTION TEAM (CIT) - this is a POLICE UNIT, specially trained.  This website has information for all of [place]'s CITs. Lays out which areas have this team.  [email or web address]  However, one can just call 911 and ask for this  special team.   If police need to be called, DO ask for this team.    CRISIS MOBILE TEAMS  [and see end of this phrase*]  [facility] -COPE: 24hrs/7days:    [phone]  (Adults > 17yo)    [phone]  (Child   < 18yo)                                       FRONT DOOR (during the day could call)   [phone]    [facility] -[phone] (Adult)  [phone] (Child)     [facility] -[phone] (Adult and Child)     [facility] -[phone] ([employer] mobile crisis team; Adult and Child; 24hr)     [facility] -[phone] (Adult and Child)     CRISIS HOUSING  [facility]                           [address]              [phone]  [facility]                                [address]                       [phone]  [facility]                               [address]     [phone]   [facility]  [address]                [phone]    [place] EMERGENCY NUMBERS      Crisis Connection:                                [phone]    [facility]:     [phone]    Crisis Intervention:                                [phone] or [phone]     COPE: Mobile Team 24hrs/7days:    [phone]  (Adults > 17yo)                                                                            [phone]  (Child   < 18yo)  [facility]        [phone] 24/7 line and Mobile Team   [address] [place] [address]  DROP IN:  M-F: 8:00 am - 7:00 pm  Sat: 11:00 am - 3:00 pm   Sun: Closed     WALK-IN COUNSELING:  [facility]       [phone]    [address]:   M, W, F:  1:00-3:00PM    M-Th:  6:30-8:30PM    TRANS and LGBT  Call [employer] at [phone]. This service is staffed by trans people 24/7.   LGBT youth <24  "contemplating suicide, call the [employer] Lifeline [phone].    POISON CONTROL CENTER  [phone]    OR  go to nearest ER    CHILD  \"[facility] has a needs assessment team who will do an intake evaluation. Based on the results of the intake they will recommended inpatient admission, partial hospitalization, intensive outpatient or outpatient care. The number is [phone]. \"    CRISIS TEXT LINE  Http://www.crisistextline.org  FREE SUPPORT AT YOUR FINGERTIPS,   Crisis Text Line serves anyone, in any type of crisis, providing access to free,  support and information via the medium people already use and trust: text. Here s how it works:  1)  Text [phone] from anywhere in the [place], anytime, about any type of crisis.  2)  A live, trained Crisis Counselor receives the text and responds quickly.      The volunteer Crisis Counselor will help you move from a 'hot moment to a cool moment'    [facility] EMERGENCY NUMBERS      Crisis Connection:                                [phone]    [facility]:              [phone]    Crisis Intervention:                                [phone] or [phone]     COPE: Mobile Team 24hrs/7days:    [phone]  (Adults > 19yo)                                                                            [phone]  (Child   < 16yo)  [facility]        [phone]  CALL 24 hours a day.  [address] [place] [address]  DROP IN:  M-F: 8:00 am - 7:00 pm  Sat: 11:00 am - 3:00 pm   Sun: Closed    WALK-IN COUNSELIN)  [facility]                                  [phone]        [address]:                  M, W:      5:00-7:00PM       2)  [facility]                            [phone]        [place], [address] [facility] [address] [facility][[address] 3]                T, Th:      6:00-8:00PM    TRANS and LGBT  Call [employer] at [phone]. This service is staffed by trans people . " "  LGBT youth <24 contemplating suicide, call the [employer] [phone].    POISON CONTROL CENTER  [phone]    OR  go to nearest ER    CHILD  \"[facility] has a needs assessment team who will do an intake evaluation. Based on the results of the intake they will recommended inpatient admission, partial hospitalization, intensive outpatient or outpatient care. The number is [phone] or [phone]. \"    CRISIS TEXT LINE  Http://www.crisistextline.org  FREE SUPPORT AT YOUR FINGERTIPS, 24/7  Crisis Text Line serves anyone, in any type of crisis, providing access to free, 24/7 support and information via the medium people already use and trust: text. Here s how it works:  1)  Text [phone] from anywhere in the [place], anytime, about any type of crisis.  2)  A live, trained Crisis Counselor receives the text and responds quickly.      The volunteer Crisis Counselor will help you move from a 'hot moment to a cool moment'      * A Community Paramedic (CP) is an advanced paramedic that works to increase access to primary and preventive care and decrease use of emergency departments, which in turn decreases health care costs. Among other things, CPs may play a key role in providing follow-up services after a hospital discharge to prevent hospital readmission. CPs can provide health assessments, chronic disease monitoring and education, medication management, immunizations and vaccinations, laboratory specimen collection, hospital discharge follow-up care and minor medical procedures. CPs work under the direction of an Ambulance Medical Director.       "

## 2021-12-24 ASSESSMENT — PATIENT HEALTH QUESTIONNAIRE - PHQ9: SUM OF ALL RESPONSES TO PHQ QUESTIONS 1-9: 12

## 2022-01-01 NOTE — PATIENT INSTRUCTIONS
Infant with stable temps in open crib. VSS on room air. Feedings increased to 46ml q3h of neosure 22. Attempted to nipple but infant not latching to bottle and did not transfer any. Voiding and stooling adequately. Mother updated at bedside by RN. Continuing to monitor.    Preparing for Your Surgery      Name:  Yoana Webber   MRN:  [id number]   :  2000   Today's Date:  2021       Arriving for surgery:  Surgery date:  21  Arrival time:  5:30 am    Restrictions due to COVID 19:       One visitor is allowed in the Pre Op area. When you go into surgery, one visitor is allowed to wait in the Surgery Waiting Room       (provided there is enough space to social distance).   After surgery- Two visitors are allowed at a time if you have a private room and one visitor is allowed for those in a semi-private room.   Every 4 days the visitor(s) can rotate. During the 4 day period, the visitor(s) must be consistent. No visitors under the age of 18 years old.   Visiting Hours: 8 am - 8:30 pm   No ill visitors.   All visitors must wear face mask.    mL[facility] parking is available for anyone with mobility limitations or disabilities.  ([place]  24 hours/ 7 days a week; [facility]  7 am- 3:30 pm, Mon- Fri)    Please come to:     [facility] [address]  [address] [place] [address]    -    On arrival to hospital, you will be asked some screening questions and directed to Patient Registration. Patient Registration will then give you further instructions. [phone]?     - ?If you are in need of directions, wheelchair or escort please stop at the Information Desk in the lobby.  Inform the information person that you are here for surgery; a wheelchair and escort to [address] will be provided.?     What can I eat or drink?  -  You may eat and drink normally for up to 8 hours before your surgery. (Until 11:30 pm 6-10-21)  -  You may have clear liquids until 2 hours before surgery. (Until 5:30 am)    Examples of clear liquids:  Water  Clear broth  Juices (apple, white grape, white cranberry  and cider) without pulp  Noncarbonated, powder based beverages  (lemonade and Ashwin-Aid)  Sodas (Sprite, 7-Up, ginger ale and  shruthi)  Coffee or tea (without milk or cream)  Gatorade    -  No Alcohol for at least 24 hours before surgery     Which medicines can I take?  Hold Aspirin for 7 days before surgery.   Hold Multivitamins for 7 days before surgery.  * Hold Supplements for 7 days before surgery. Hold Calcium-Vitamin D-Vitamin K starting now, if you have not already.  * Hold Ibuprofen (Advil, Motrin) for 1 day before surgery--unless otherwise directed by surgeon.  Hold Naproxen (Aleve) for 4 days before surgery.    -  PLEASE TAKE these medications the day of surgery:  Aripiprazole (Abilify), Atomoxetine (Strattera), Desvenlafaxine (Pristiq), Memantine (Namenda),   Albuterol inhaler if needed  Ondansetron (Zofran) if needed  Clonazepam (Klonopin) if needed  Acetaminophen (Tylenol) if needed    How do I prepare myself?  -  Bring Albuterol inhaler.  - Please take 2 showers before surgery using Scrubcare or Hibiclens soap.    Use this soap only from the neck to your toes.     Leave the soap on your skin for one minute--then rinse thoroughly.      You may use your own shampoo and conditioner; no other hair products.   - Please remove all jewelry and body piercings.  - No lotions, deodorants or fragrance.  - No makeup or fingernail polish.   - Bring your ID and insurance card.    - All patients are required to have a Covid-19 test within 4 days of surgery/procedure.      -Patients will be contacted by the [facility] scheduling team within 1 week of surgery to make an appointment.      - Patients may call the Scheduling team at [phone] if they have not been scheduled within 4 days of  surgery.      ALL PATIENTS GOING HOME THE SAME DAY OF SURGERY ARE REQUIRED TO HAVE A RESPONSIBLE ADULT TO DRIVE AND BE IN ATTENDANCE WITH THEM FOR 24 HOURS FOLLOWING SURGERY.    IF THE RESPONSIBLE ADULT IS REQUIRED FOR POST OP TEACHING THE POST OP RN WILL ASK THEM TO COME BACK TO THE RECOVERY AREA.    Questions or Concerns:    - For any questions  regarding the day of surgery or your hospital stay, please contact the Pre Admission Nursing Office at [phone].       - If you have health changes between today and your surgery please call your surgeon.       For questions after surgery please call your surgeons office.

## 2022-03-21 ENCOUNTER — OFFICE VISIT (OUTPATIENT)
Dept: PSYCHIATRY | Facility: CLINIC | Age: 22
End: 2022-03-21
Payer: COMMERCIAL

## 2022-03-21 VITALS
HEIGHT: 64 IN | BODY MASS INDEX: 36.67 KG/M2 | HEART RATE: 121 BPM | DIASTOLIC BLOOD PRESSURE: 84 MMHG | TEMPERATURE: 96.9 F | SYSTOLIC BLOOD PRESSURE: 119 MMHG | WEIGHT: 214.8 LBS

## 2022-03-21 DIAGNOSIS — F33.2 SEVERE EPISODE OF RECURRENT MAJOR DEPRESSIVE DISORDER, WITHOUT PSYCHOTIC FEATURES (H): Primary | ICD-10-CM

## 2022-03-21 RX ORDER — TOPIRAMATE 25 MG/1
25 TABLET, FILM COATED ORAL 2 TIMES DAILY
COMMUNITY
Start: 2022-03-09

## 2022-03-21 ASSESSMENT — PATIENT HEALTH QUESTIONNAIRE - PHQ9: SUM OF ALL RESPONSES TO PHQ QUESTIONS 1-9: 8

## 2022-03-21 NOTE — PROGRESS NOTES
"  Psychiatry Clinic Progress Note                                                                   Yoana Webber is a 22 year old female who prefers the name Yoana & pronouns she, her, hers.  Referred by:  Dr. Jes Rey M.D. Receives Ketamine per [facility]  History was provided by patient and mother who was a good historian.     Pertinent Background: Hx of chronic suicidality, SIB(last was cutting on Sept 19, 2020), and eating disorder. 4 prior suicide attempts. Has had >10 hospitalizations and 1 year of residential treatment (ended December 2019). Previously benefited from ECT but developed delirium. partial ketamine response. Returned to residential treatment 2021, VNS implanted 6/2021. Her mood can respond slightly to positive life event    Interim History                                                                                                        4, 4   Since last visit:     -Regarding her depression, she states she is doing \"okay\". Although she had a fall while skiing and tore four ligament in her right knee, she reports she has been handling it pretty well and has \"not been suicidal\", with \"Much much less.\" suicidal ideations and never a plan or an intent.     - No SIB. She continues to have anxiety through \"what if\" thoughts, social anxiety. Was racing thoughts.     Psychiatrist just prescribed topamax. She states her eating disorder can be mildly distressing but is under control (no purging)    - Regarding her ketamine,she states it \"is going great\". She agrees to stay on Q4wks, especially that her right knee surgery is coming up and that might be stressful. She agrees to try to increase interval to 5 weeks after the surgery.     - Overall tolerating VNS, denies adverse effects today.    Recent Symptoms:   Depression:  suicidal ideation without intent or plan, depressed mood, low energy, feeling worthless and more controlled dysregulation  Anxiety:  mildly less excessive " worry and social anxiety     Recent Substance Use:  none reported        Social/ Family History                                  [per patient report]                                 1ea,1ea   LIVING SITUATION- Living with Dad       Medical / Surgical History                                                                                                                  Past Medical History:   Diagnosis Date     Anxiety      Depressive disorder      OCD (obsessive compulsive disorder)      PTSD (post-traumatic stress disorder)      Past Surgical History:   Procedure Laterality Date     CHOLECYSTECTOMY       ENT SURGERY      tonsillectomy     IMPLANT STIMULATOR VAGUS NERVE Left 6/11/2021    Procedure: Vagus nerve stimulator placement with placement of generator/battery over left chest wall  **Latex Allergy**;  Surgeon: Adnres Martinez MD;  Location:  OR       Medical Review of Systems                                                                                                    2,10   Medical review is constricted to what is mentioned in interim history section above.    Allergy                                Latex, Vicodin [hydrocodone-acetaminophen], and Codeine    Current Medications                                                                                                       Current Outpatient Medications:      albuterol (PROAIR HFA/PROVENTIL HFA/VENTOLIN HFA) 108 (90 Base) MCG/ACT inhaler, Inhale 2 puffs into the lungs every 4 hours as needed for shortness of breath / dyspnea or wheezing, Disp: , Rfl:      ARIPiprazole (ABILIFY) 15 MG tablet, Take 15 mg by mouth daily , Disp: , Rfl:      clobetasol (TEMOVATE) 0.05 % CREA cream, Apply topically 2 times daily as needed for eczema, Disp: , Rfl:      clobetasol (TEMOVATE) 0.05 % external cream, APPLY TO THE AFFECTED AREA(S) TWICE DAILY AS NEEDED for eczema, Disp: , Rfl:      clonazePAM (KLONOPIN) 1 MG tablet, Take 1 tablet (1 mg) by mouth 2 times daily  as needed for anxiety (Patient taking differently: Take 1 mg by mouth 3 times daily as needed for anxiety ), Disp: 60 tablet, Rfl: 0     desvenlafaxine (PRISTIQ) 100 MG 24 hr tablet, Take 1 tablet (100 mg) by mouth At Bedtime (Patient taking differently: Take 100 mg by mouth every morning ), Disp: 30 tablet, Rfl: 1     Levonorgestrel-Ethinyl Estrad (VIENVA PO), Take 1 tablet by mouth At Bedtime, Disp: , Rfl:      memantine (NAMENDA) 10 MG tablet, Take 10 mg by mouth 2 times daily , Disp: , Rfl:      ondansetron (ZOFRAN) 4 MG tablet, Take 1 tablet (4 mg) by mouth every 8 hours as needed for nausea or vomiting, Disp: 30 tablet, Rfl: 0     polyethylene glycol (MIRALAX) 17 GM/Dose powder, Take 1 capful by mouth 2 times daily as needed , Disp: , Rfl:      simethicone (MYLICON) 125 MG chewable tablet, Take 125 mg by mouth daily as needed , Disp: , Rfl:      topiramate (TOPAMAX) 25 MG tablet, Take 25 mg by mouth 2 times daily 1 tabs in morning and 2 tabs at night., Disp: , Rfl:      traZODone (DESYREL) 100 MG tablet, Take 50 mg by mouth At Bedtime , Disp: , Rfl:      ziprasidone (GEODON) 60 MG capsule, Take 60 mg by mouth At Bedtime , Disp: , Rfl:      gabapentin (NEURONTIN) 400 MG capsule, Take 400 mg by mouth 2 times daily (Patient not taking: Reported on 3/21/2022), Disp: , Rfl:      montelukast (SINGULAIR) 10 MG tablet, Take 10 mg by mouth At Bedtime  (Patient not taking: Reported on 12/20/2021), Disp: , Rfl:      prazosin (MINIPRESS) 2 MG capsule, Take 1 capsule (2 mg) by mouth At Bedtime (Patient not taking: Reported on 12/20/2021), Disp: 30 capsule, Rfl: 1     Prenatal 27-1 MG TABS, , Disp: , Rfl:   No current facility-administered medications for this visit.    Facility-Administered Medications Ordered in Other Visits:      hydrALAZINE (APRESOLINE) injection 10 mg, 10 mg, Intravenous, Once PRN, Zita Fregoso MD     INFUSION HYPERSENSITIVITY, , Does not apply, Continuous PRNIldefonso Ziad, MD     ketamine (KETALAR)  "0.5 mg/kg = 27.5 mg in sodium chloride 0.9 % 50.55 mL intermittent infusion, 0.5 mg/kg (Ideal), Intravenous, Once, Zita Fregoso MD     sodium chloride (PF) 0.9% PF flush 3-20 mL, 3-20 mL, Intracatheter, Q1H PRN, Zita Fregoso MD        Vitals                                                                                                                       3, 3   /84   Pulse (!) 121   Temp 96.9  F (36.1  C) (Temporal)   Ht 1.626 m (5' 4\")   Wt 97.4 kg (214 lb 12.8 oz)   BMI 36.87 kg/m       Mental Status Exam                                                                                    9, 14 cog gs     Alertness: alert  and oriented  Appearance: well groomed, light brown hair, casual clothes. Both VNS scars appear to be healing well without keloidal accumulation.   Behavior/Demeanor: cooperative and calm, with good  eye contact   Speech: regular rate and rhythm  Language: intact  Psychomotor: slowed  Mood:  \"okay\", almost euthymic   Affect: ess restricted, smiling more than previous visit, was partially congruent to mood; was congruent to content  Thought Process/Associations: comprehensive, logical   Thought Content:  Reports suicidal ideation without intent or plan ;  Denies delusions  Perception: Reports: none Denies visual/ auditory hallucinations  Insight: good  Judgment: good  Cognition: (6) does  appear grossly intact; formal cognitive testing was not done  Gait/Station and/or Muscle Strength/Tone: unremarkable    Labs and Data                                                                                                                 VNS   Diagnostics    Impedance : 2804 Ohms  Battery %    Settings on 6/25/2021:  1mA  Frequency 20hz  Pulse width 250 microsec  On time : 30 sec  Off time 5 min     Settings on 7/26/2021  1.5 mA  Frequency 20hz  Pulse width 250 microsec  On time : 30 sec  Off time 5 min     Settings on 8/16/2021  1.75 mA - tolerated increase from 1.5 mA with minor and " tolerable tingling felt on inferior left side of mentum  Frequency 20hz  Pulse width 250 microsec  On time : 30 sec  Off time 5 min     Settings on 12/20/2021  1.75 mA - tolerated increase from 1.5 mA with minor and tolerable tingling felt on inferior left side of mentum  Frequency 20hz  Pulse width 250 microsec  On time : 30 sec  Off time 5 min     Settings today (3/21/2022)  1.75 mA - no change done this visit  Frequency 20hz  Pulse width 250 microsec  On time : 30 sec  Off time 5 min         Rating Scales:       N/A    PHQ9 Today:  12  PHQ 7/26/2021 12/24/2021 3/21/2022   PHQ-9 Total Score 11 12 8   Q9: Thoughts of better off dead/self-harm past 2 weeks Several days More than half the days Several days       Diagnosis and Assessment                                                                             m2, h3     Today the following issues were addressed:    Major Depressive Disorder  VNS interrogation and ketamine follow-up    Yoana presented without her parents today with signs of improvement of her chronic severe depression following suicide attempt in early December on acetaminophen. Since then has increased ketamine with [facility] and feels like suicidal ideation has significantly improved. VNS device was interrogated dose was not adjusted as currently on appropriate settings.   Continues to do well on ketamine. As her knee surgery is coming up, we agree to delay the stretch of interval until after recovery.  Will reassess in 6 months or sooner if any complaints arise.      [place] Prescription Monitoring Program [] review was not needed today.    Drug Interaction Management: Monitoring for adverse effects    Plan                                                                                                                    m2, h3      1) Major Depressive Disorder           A.  Medication: Continue oral medications as managed by Dr. Jes Rey and ketamine per [facility]            B. Psychotherapy: Continue  Psychotherapy    2) VNS    - side effects: tolerable with resolution of cardiac side effects discussed last visit.    - Follow-up: agrees that we are at a stable titration dose and ready to enter the depressive symptoms-follow-up phase, where she agrees to report back in 6 months for any response      RTC: 6 months     CRISIS NUMBERS:   Provided routinely in AVS.    Treatment Risk Statement:  The patient understands the risks, benefits, adverse effects and alternatives. Agrees to treatment with the capacity to do so. No medical contraindications to treatment. Agrees to call clinic for any problems. The patient understands to call 911 or go to the nearest ED if life threatening or urgent symptoms occur.     Provider: Sparkle Mejía MD (Neuromodulation Med Fellow)    Case was discussed with Zita Fregoso MD      Physician Attestation   I, Zita Fregoso MD, agree with the findings and plan of care as documented in the note.      Zita Fregoso MD

## 2022-06-08 NOTE — TELEPHONE ENCOUNTER
Message sent to VIKTOR Reddy patient acces at [facility] to find out length of auth.     23-Jun-2022

## 2022-06-20 ENCOUNTER — OFFICE VISIT (OUTPATIENT)
Dept: PSYCHIATRY | Facility: CLINIC | Age: 22
End: 2022-06-20
Payer: COMMERCIAL

## 2022-06-20 VITALS
BODY MASS INDEX: 35.58 KG/M2 | DIASTOLIC BLOOD PRESSURE: 91 MMHG | TEMPERATURE: 98 F | HEART RATE: 103 BPM | HEIGHT: 64 IN | SYSTOLIC BLOOD PRESSURE: 130 MMHG | WEIGHT: 208.4 LBS

## 2022-06-20 DIAGNOSIS — F33.2 SEVERE EPISODE OF RECURRENT MAJOR DEPRESSIVE DISORDER, WITHOUT PSYCHOTIC FEATURES (H): Primary | ICD-10-CM

## 2022-06-20 RX ORDER — ALPRAZOLAM 1 MG
1 TABLET ORAL
COMMUNITY
Start: 2022-05-19

## 2022-06-20 ASSESSMENT — PATIENT HEALTH QUESTIONNAIRE - PHQ9: SUM OF ALL RESPONSES TO PHQ QUESTIONS 1-9: 9

## 2022-06-20 NOTE — PROGRESS NOTES
"  Psychiatry Clinic Progress Note                                                                   Yoana Webber is a 22 year old female who prefers the name Yoana & pronouns she, her, hers.  Referred by:  Dr. Jes Rey M.D. Receives Ketamine per [facility]  History was provided by patient and mother who was a good historian.     Pertinent Background: Hx of chronic suicidality, SIB(last was cutting on Sept 19, 2020), and eating disorder. 4 prior suicide attempts. Has had >10 hospitalizations and 1 year of residential treatment (ended December 2019). Previously benefited from ECT but developed delirium. partial ketamine response. Returned to residential treatment 2021, VNS implanted 6/2021. Her mood can respond slightly to positive life event    Interim History                                                                                                        4, 4   Since last visit:     -Regarding her depression, she states she is doing \"okay\" (depression 6-7/10 vs 1/10 before VNS). She said she had had knee surgery since being here last and did not have VNS turned off and did not have any complications.     - She has had acutely increased anxiety (racing thoughts and feeling \"on edge\") after having experienced a traumatic event recently; declined to elaborate on the event as she \"hasn't processed it yet with my therapist\".     - She states her eating disorder can be mildly distressing but is under control (no purging)     - Regarding her ketamine,she states it \"is going great\". She plans to stay on Q4wks for ketamine     - Overall tolerating VNS, denies adverse effects today. Has occasional hoarseness of voice.     Recent Symptoms:   Depression:  Denies suicidal ideation without intent or plan, depressed mood, low energy, feeling worthless and more controlled dysregulation  Anxiety:  mildly less excessive worry and social anxiety    PHQ-9 score today: 9     Recent Substance Use:  none " reported        Social/ Family History                                  [per patient report]                                 1ea,1ea   LIVING SITUATION- Living with Dad       Medical / Surgical History                                                                                                                  Past Medical History:   Diagnosis Date     Anxiety      Depressive disorder      OCD (obsessive compulsive disorder)      PTSD (post-traumatic stress disorder)      Past Surgical History:   Procedure Laterality Date     CHOLECYSTECTOMY       ENT SURGERY      tonsillectomy     IMPLANT STIMULATOR VAGUS NERVE Left 6/11/2021    Procedure: Vagus nerve stimulator placement with placement of generator/battery over left chest wall  **Latex Allergy**;  Surgeon: Andres Martinez MD;  Location:  OR       Medical Review of Systems                                                                                                    2,10   Medical review is constricted to what is mentioned in interim history section above.    Allergy                                Latex, Vicodin [hydrocodone-acetaminophen], and Codeine    Current Medications                                                                                                       Current Outpatient Medications:      albuterol (PROAIR HFA/PROVENTIL HFA/VENTOLIN HFA) 108 (90 Base) MCG/ACT inhaler, Inhale 2 puffs into the lungs every 4 hours as needed for shortness of breath / dyspnea or wheezing, Disp: , Rfl:      ARIPiprazole (ABILIFY) 15 MG tablet, Take 15 mg by mouth daily , Disp: , Rfl:      clobetasol (TEMOVATE) 0.05 % CREA cream, Apply topically 2 times daily as needed for eczema, Disp: , Rfl:      clobetasol (TEMOVATE) 0.05 % external cream, APPLY TO THE AFFECTED AREA(S) TWICE DAILY AS NEEDED for eczema, Disp: , Rfl:      clonazePAM (KLONOPIN) 1 MG tablet, Take 1 tablet (1 mg) by mouth 2 times daily as needed for anxiety (Patient taking differently: Take  1 mg by mouth 3 times daily as needed for anxiety ), Disp: 60 tablet, Rfl: 0     desvenlafaxine (PRISTIQ) 100 MG 24 hr tablet, Take 1 tablet (100 mg) by mouth At Bedtime (Patient taking differently: Take 100 mg by mouth every morning ), Disp: 30 tablet, Rfl: 1     gabapentin (NEURONTIN) 400 MG capsule, Take 400 mg by mouth 2 times daily (Patient not taking: Reported on 3/21/2022), Disp: , Rfl:      Levonorgestrel-Ethinyl Estrad (VIENVA PO), Take 1 tablet by mouth At Bedtime, Disp: , Rfl:      memantine (NAMENDA) 10 MG tablet, Take 10 mg by mouth 2 times daily , Disp: , Rfl:      montelukast (SINGULAIR) 10 MG tablet, Take 10 mg by mouth At Bedtime  (Patient not taking: Reported on 12/20/2021), Disp: , Rfl:      ondansetron (ZOFRAN) 4 MG tablet, Take 1 tablet (4 mg) by mouth every 8 hours as needed for nausea or vomiting, Disp: 30 tablet, Rfl: 0     polyethylene glycol (MIRALAX) 17 GM/Dose powder, Take 1 capful by mouth 2 times daily as needed , Disp: , Rfl:      prazosin (MINIPRESS) 2 MG capsule, Take 1 capsule (2 mg) by mouth At Bedtime (Patient not taking: Reported on 12/20/2021), Disp: 30 capsule, Rfl: 1     Prenatal 27-1 MG TABS, , Disp: , Rfl:      simethicone (MYLICON) 125 MG chewable tablet, Take 125 mg by mouth daily as needed , Disp: , Rfl:      topiramate (TOPAMAX) 25 MG tablet, Take 25 mg by mouth 2 times daily 1 tabs in morning and 2 tabs at night., Disp: , Rfl:      traZODone (DESYREL) 100 MG tablet, Take 50 mg by mouth At Bedtime , Disp: , Rfl:      ziprasidone (GEODON) 60 MG capsule, Take 60 mg by mouth At Bedtime , Disp: , Rfl:   No current facility-administered medications for this visit.    Facility-Administered Medications Ordered in Other Visits:      hydrALAZINE (APRESOLINE) injection 10 mg, 10 mg, Intravenous, Once PRN, Zita Fregoso MD     INFUSION HYPERSENSITIVITY, , Does not apply, Continuous PRN, Zita Fregoso MD     ketamine (KETALAR) 0.5 mg/kg = 27.5 mg in sodium chloride 0.9 % 50.55 mL  "intermittent infusion, 0.5 mg/kg (Ideal), Intravenous, Once, Zita Fregoso MD     sodium chloride (PF) 0.9% PF flush 3-20 mL, 3-20 mL, Intracatheter, Q1H PRN, Zita Fregoso MD        Vitals                                                                                                                       3, 3   There were no vitals taken for this visit.     Mental Status Exam                                                                                    9, 14 cog gs     Alertness: alert  and oriented  Appearance: well groomed, light brown hair, casual clothes. Both VNS scars appear to be healing well without keloidal accumulation. Multiple healing scars around right patella   Behavior/Demeanor: cooperative and calm, with good  eye contact   Speech: regular rate and rhythm  Language: intact  Psychomotor: slowed  Mood: \"pretty good\"  Affect: somewhat restricted, was partially congruent to mood; was congruent to content  Thought Process/Associations: comprehensive, logical   Thought Content:  Reports suicidal ideation without intent or plan ;  Denies delusions  Perception: Reports: none Denies visual/ auditory hallucinations  Insight: good  Judgment: good  Cognition: (6) does  appear grossly intact; formal cognitive testing was not done  Gait/Station and/or Muscle Strength/Tone: using crutch to ambulate with somewhat antalgic gait but otherwise unremarkable    Labs and Data                                                                                                                 VNS   Diagnostics    Impedance : 2804 Ohms  Battery %    Settings on 6/25/2021:  1mA  Frequency 20hz  Pulse width 250 microsec  On time : 30 sec  Off time 5 min     Settings on 7/26/2021  1.5 mA  Frequency 20hz  Pulse width 250 microsec  On time : 30 sec  Off time 5 min     Settings on 8/16/2021  1.75 mA - tolerated increase from 1.5 mA with minor and tolerable tingling felt on inferior left side of mentum  Frequency 20hz  Pulse " width 250 microsec  On time : 30 sec  Off time 5 min     Settings on 12/20/2021  1.75 mA - tolerated increase from 1.5 mA with minor and tolerable tingling felt on inferior left side of mentum  Frequency 20hz  Pulse width 250 microsec  On time : 30 sec  Off time 5 min     Settings (3/21/2022)  1.75 mA - no change done this visit  Frequency 20hz  Pulse width 250 microsec  On time : 30 sec  Off time 5 min     Settings today (6/20/2022)  1.75 mA - no change done this visit  Frequency 20hz  Pulse width 250 microsec  On time : 30 sec  Off time 5 min       Rating Scales:       PHQ 7/26/2021 12/24/2021 3/21/2022   PHQ-9 Total Score 11 12 8   Q9: Thoughts of better off dead/self-harm past 2 weeks Several days More than half the days Several days       Diagnosis and Assessment                                                                             m2, h3     Today the following issues were addressed:    Major Depressive Disorder  VNS interrogation and ketamine follow-up    Yoana presented with her father today with signs of improvement of her chronic severe depression following suicide attempt in early December on acetaminophen. Since then has increased ketamine with [facility] and feels like suicidal ideation has significantly improved. VNS device was interrogated dose was not adjusted as currently on appropriate settings.   Continues to do well on ketamine. Her surgery on her knee went well; we will not change settings on VNS at this time as the current treatment is treating her depressive symptoms well. Will reassess in 6 months or sooner if any complaints arise.      [place] Prescription Monitoring Program [] review was not needed today.    Drug Interaction Management: Monitoring for adverse effects    Plan                                                                                                                    m2, h3      1) Major Depressive Disorder           A.  Medication: Continue oral  medications as managed by Dr. Jes Rey and ketamine per [facility]            B. Psychotherapy: Continue  Psychotherapy    2) VNS    - side effects: tolerable with resolution of cardiac side effects discussed last visit.    - Follow-up: agrees that we are at a stable titration dose and ready to enter the depressive symptoms-follow-up phase, where she agrees to report back in 6 months for any response      RTC: 6 months     CRISIS NUMBERS:   Provided routinely in AVS.    Treatment Risk Statement:  The patient understands the risks, benefits, adverse effects and alternatives. Agrees to treatment with the capacity to do so. No medical contraindications to treatment. Agrees to call clinic for any problems. The patient understands to call 911 or go to the nearest ED if life threatening or urgent symptoms occur.      Sparkle Mejía MD (Neuromodulation Med Fellow)      Physician Attestation   I, Zita Fregoso MD, agree with the findings and plan of care as documented in the note.      Provider:Zita Fregoso MD

## 2022-07-16 ENCOUNTER — HEALTH MAINTENANCE LETTER (OUTPATIENT)
Age: 22
End: 2022-07-16

## 2022-09-18 ENCOUNTER — HEALTH MAINTENANCE LETTER (OUTPATIENT)
Age: 22
End: 2022-09-18

## 2022-12-19 ENCOUNTER — OFFICE VISIT (OUTPATIENT)
Dept: PSYCHIATRY | Facility: CLINIC | Age: 22
End: 2022-12-19
Payer: COMMERCIAL

## 2022-12-19 VITALS
HEART RATE: 82 BPM | DIASTOLIC BLOOD PRESSURE: 73 MMHG | TEMPERATURE: 97.7 F | HEIGHT: 64 IN | BODY MASS INDEX: 32.44 KG/M2 | SYSTOLIC BLOOD PRESSURE: 136 MMHG | WEIGHT: 190 LBS

## 2022-12-19 DIAGNOSIS — F33.1 MAJOR DEPRESSIVE DISORDER, RECURRENT EPISODE, MODERATE (H): Primary | ICD-10-CM

## 2022-12-19 DIAGNOSIS — F33.1 MODERATE EPISODE OF RECURRENT MAJOR DEPRESSIVE DISORDER (H): ICD-10-CM

## 2022-12-19 NOTE — PROGRESS NOTES
"  Psychiatry Clinic Progress Note                                                                   Yoana Webber is a 22 year old female who prefers the name Yoana & pronouns she, her, hers.  Referred by:  Dr. Jes Rey M.D. Receives Ketamine per [facility]  History was provided by patient and mother who was a good historian.     Pertinent Background: Hx of chronic suicidality, SIB (last was cutting on Sept 19, 2020), and eating disorder. 4 prior suicide attempts. Has had >10 hospitalizations and 1 year of residential treatment (ended December 2019). Previously benefited from ECT but developed delirium. partial ketamine response. Returned to residential treatment 2021, VNS implanted 6/2021. Her mood can respond slightly to positive life event    Interim History                                                                                                        4, 4     Since last visit:   Patient expressed that her mood is good. On a scale of 0 to 10 where 0 is the worst, she rated her mood as 6/10.     She mentioned that her meds and VNS are working well. Ketamine is doing very well, she said that with a lot of emphasis. Ketamine helps her moved from \"always want to end her life\" to \"an attempt\", then to \"no SI\" at all. Her last attempt was November 2021.    Regarding the VNS, she feels that it is helpful for the depression. For example, she is able to socialize virtually. Most of her friends are through the eating disorder program that she is in. One in [place], and the other one in [place]. They text each other and talk often. She doesn't have other friend outside of the program.     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 She had two surgeries resulting for that.     Regarding the VNS functionality, it doesn't bother her. Side effect is tolerable. She mentioned that her voice becomes coarse sometimes- it did happen during interview. She turns it off when " she is talking with people outside of her family.    She reported that sleep is good -trazodone and xanax help. She doesn't have appetite. She mentioned that her lack of appetite is due to her eating disorder. She mentioned good energy. She does report that evening can be hard for her.She can't put her finger into a cause.     Patient denies SI/HI, AH/VH            Recent Symptoms:   Depression:  Denies suicidal ideation without intent or plan, depressed mood, low energy, feeling worthless and more controlled dysregulation  Anxiety:  Patient is able to socialize virtually with others.     PHQ-9 score today: 13     Recent Substance Use:  none reported        Social/ Family History                                  [per patient report]                                 1ea,1ea   LIVING SITUATION- Living with Dad       Medical / Surgical History                                                                                                                  Past Medical History:   Diagnosis Date     Anxiety      Depressive disorder      OCD (obsessive compulsive disorder)      PTSD (post-traumatic stress disorder)      Past Surgical History:   Procedure Laterality Date     CHOLECYSTECTOMY       ENT SURGERY      tonsillectomy     IMPLANT STIMULATOR VAGUS NERVE Left 6/11/2021    Procedure: Vagus nerve stimulator placement with placement of generator/battery over left chest wall  **Latex Allergy**;  Surgeon: Andres Martinez MD;  Location: [facility]       Medical Review of Systems                                                                                                    2,10   Medical review is constricted to what is mentioned in interim history section above.    Allergy                                Latex, Vicodin [hydrocodone-acetaminophen], and Codeine    Current Medications                                                                                                       Current Outpatient Medications:       albuterol (PROAIR HFA/PROVENTIL HFA/VENTOLIN HFA) 108 (90 Base) MCG/ACT inhaler, Inhale 2 puffs into the lungs every 4 hours as needed for shortness of breath / dyspnea or wheezing, Disp: , Rfl:      ALPRAZolam (XANAX) 1 MG tablet, Take 1 mg by mouth Take 1 mg by mouth 3 (three) times a day as needed, Disp: , Rfl:      ARIPiprazole (ABILIFY) 15 MG tablet, Take 15 mg by mouth daily , Disp: , Rfl:      clobetasol (TEMOVATE) 0.05 % CREA cream, Apply topically 2 times daily as needed for eczema, Disp: , Rfl:      clobetasol (TEMOVATE) 0.05 % external cream, APPLY TO THE AFFECTED AREA(S) TWICE DAILY AS NEEDED for eczema, Disp: , Rfl:      clonazePAM (KLONOPIN) 1 MG tablet, Take 1 tablet (1 mg) by mouth 2 times daily as needed for anxiety (Patient taking differently: Take 1 mg by mouth 3 times daily as needed for anxiety), Disp: 60 tablet, Rfl: 0     desvenlafaxine (PRISTIQ) 100 MG 24 hr tablet, Take 1 tablet (100 mg) by mouth At Bedtime (Patient taking differently: Take 100 mg by mouth every morning), Disp: 30 tablet, Rfl: 1     gabapentin (NEURONTIN) 400 MG capsule, Take 400 mg by mouth 2 times daily (Patient not taking: No sig reported), Disp: , Rfl:      Levonorgestrel-Ethinyl Estrad (VIENVA PO), Take 1 tablet by mouth At Bedtime, Disp: , Rfl:      memantine (NAMENDA) 10 MG tablet, Take 10 mg by mouth 2 times daily , Disp: , Rfl:      montelukast (SINGULAIR) 10 MG tablet, Take 10 mg by mouth At Bedtime  (Patient not taking: No sig reported), Disp: , Rfl:      ondansetron (ZOFRAN) 4 MG tablet, Take 1 tablet (4 mg) by mouth every 8 hours as needed for nausea or vomiting, Disp: 30 tablet, Rfl: 0     polyethylene glycol (MIRALAX) 17 GM/Dose powder, Take 1 capful by mouth 2 times daily as needed , Disp: , Rfl:      prazosin (MINIPRESS) 2 MG capsule, Take 1 capsule (2 mg) by mouth At Bedtime (Patient not taking: No sig reported), Disp: 30 capsule, Rfl: 1     Prenatal 27-1 MG TABS, , Disp: , Rfl:      simethicone (MYLICON)  "125 MG chewable tablet, Take 125 mg by mouth daily as needed , Disp: , Rfl:      topiramate (TOPAMAX) 25 MG tablet, Take 25 mg by mouth 2 times daily 1 tabs in morning and 2 tabs at night., Disp: , Rfl:      traZODone (DESYREL) 100 MG tablet, Take 50 mg by mouth At Bedtime , Disp: , Rfl:      ziprasidone (GEODON) 60 MG capsule, Take 60 mg by mouth At Bedtime , Disp: , Rfl:   No current facility-administered medications for this visit.    Facility-Administered Medications Ordered in Other Visits:      hydrALAZINE (APRESOLINE) injection 10 mg, 10 mg, Intravenous, Once PRN, Zita Fregoso MD     INFUSION HYPERSENSITIVITY, , Does not apply, Continuous PRN, Zita Fregoso MD     ketamine (KETALAR) 0.5 mg/kg = 27.5 mg in sodium chloride 0.9 % 50.55 mL intermittent infusion, 0.5 mg/kg (Ideal), Intravenous, Once, Zita Fregoso MD     sodium chloride (PF) 0.9% PF flush 3-20 mL, 3-20 mL, Intracatheter, Q1H PRN, Zita Fregoso MD        Vitals                                                                                                                       3, 3   There were no vitals taken for this visit.     Mental Status Exam                                                                                    9, 14 cog gs     Alertness: alert  and oriented  Appearance: well groomed, casual clothes.   Behavior/Demeanor: cooperative and calm, with good  eye contact   Speech: regular rate and rhythm  Language: intact  Psychomotor: normal or unremarkable and slowed  Mood: \"good\"  Affect: appropriate, non reactive,and congruent with mood  Thought Process/Associations: comprehensive, logical, and linear   Thought Content:  Reports none without intent or plan ;  Denies suicidal ideation and delusions  Perception: Reports: none Denies visual/ auditory hallucinations  Insight: good  Judgment: good  Cognition: (6) does  appear grossly intact; formal cognitive testing was not done     Gait/Station and/or Muscle Strength/Tone: normal    Labs " and Data                                                                                                                 VNS   Diagnostics    Impedance : 2944 Ohms  Battery %    Settings on 6/25/2021:  1mA  Frequency 20hz  Pulse width 250 microsec  On time : 30 sec  Off time 5 min     Settings on 7/26/2021  1.5 mA  Frequency 20hz  Pulse width 250 microsec  On time : 30 sec  Off time 5 min     Settings on 8/16/2021  1.75 mA - tolerated increase from 1.5 mA with minor and tolerable tingling felt on inferior left side of mentum  Frequency 20hz  Pulse width 250 microsec  On time : 30 sec  Off time 5 min     Settings on 12/20/2021  1.75 mA - tolerated increase from 1.5 mA with minor and tolerable tingling felt on inferior left side of mentum  Frequency 20hz  Pulse width 250 microsec  On time : 30 sec  Off time 5 min     Settings (3/21/2022)  1.75 mA - no change done this visit  Frequency 20hz  Pulse width 250 microsec  On time : 30 sec  Off time 5 min     Settings today (6/20/2022)  1.75 mA - no change done this visit  Frequency 20 Hz  Pulse width 250 microsec  On time : 30 sec  Off time 5 min       Settings today (12/19/2022)  1.75 mA - no change done this visit  Frequency 20 Hz  Pulse width 250 microsec  On time : 30 sec  Off time 5 min   Rating Scales:       PHQ 12/24/2021 3/21/2022 6/20/2022   PHQ-9 Total Score 12 8 9   Q9: Thoughts of better off dead/self-harm past 2 weeks More than half the days Several days Not at all       Diagnosis and Assessment                                                                             m2, h3     Today the following issues were addressed:    Major Depressive Disorder  VNS interrogation and ketamine follow-up    Yoana presented alone to clinic. She is stable, and notes great benefit from VNS and Ketamine.     Her mood and energy are good.     Will continue with same meds regimen and VNS.    Will reassess in 6 months or sooner if any complaints arise.      [place] Prescription  Monitoring Program [] review was not needed today.    Drug Interaction Management: Monitoring for adverse effects    Plan                                                                                                                    m2, h3      1) Major Depressive Disorder           A.  Medication:     Continue oral medications as managed by Dr. Jes Rey and ketamine per [facility]            B. Psychotherapy:     Continue  Psychotherapy    2) VNS    - side effects: tolerable. Report coarse voice.     - Follow-up: 6 months.      RTC: 6 months     CRISIS NUMBERS:   Provided routinely in AVS.    Treatment Risk Statement:  The patient understands the risks, benefits, adverse effects and alternatives. Agrees to treatment with the capacity to do so. No medical contraindications to treatment. Agrees to call clinic for any problems. The patient understands to call 911 or go to the nearest ED if life threatening or urgent symptoms occur.     Kelvin Felix MD MPH  PGY 2 Psychiatry resident      Physician Attestation   I, Zita Fregoso MD, agree with the findings and plan of care as documented in the note.    I was present throughout the entire visit.    Provider:Zita Fregoso MD    Visit 30 minutes  Review of records and documentation: 15 min

## 2022-12-21 ASSESSMENT — PATIENT HEALTH QUESTIONNAIRE - PHQ9: SUM OF ALL RESPONSES TO PHQ QUESTIONS 1-9: 13

## 2023-07-30 ENCOUNTER — HEALTH MAINTENANCE LETTER (OUTPATIENT)
Age: 23
End: 2023-07-30

## 2023-12-21 ENCOUNTER — HOSPITAL ENCOUNTER (OUTPATIENT)
Dept: RESEARCH | Facility: CLINIC | Age: 23
Discharge: HOME | End: 2023-12-21
Attending: SURGERY
Payer: COMMERCIAL

## 2023-12-21 ENCOUNTER — LAB REQUISITION (OUTPATIENT)
Dept: LAB | Facility: CLINIC | Age: 23
End: 2023-12-21

## 2023-12-21 LAB
BASOPHILS # BLD AUTO: 0 10E3/UL (ref 0–0.2)
BASOPHILS NFR BLD AUTO: 1 %
CHOLEST SERPL-MCNC: 186 MG/DL
CRP SERPL-MCNC: 21.4 MG/L
EOSINOPHIL # BLD AUTO: 0.3 10E3/UL (ref 0–0.7)
EOSINOPHIL NFR BLD AUTO: 4 %
ERYTHROCYTE [DISTWIDTH] IN BLOOD BY AUTOMATED COUNT: 14.7 % (ref 10–15)
ESTRADIOL SERPL-MCNC: 85 PG/ML
FASTING STATUS PATIENT QL REPORTED: YES
FASTING STATUS PATIENT QL REPORTED: YES
GLUCOSE SERPL-MCNC: 83 MG/DL (ref 70–99)
HBA1C MFR BLD: 5.6 %
HCT VFR BLD AUTO: 39.5 % (ref 35–47)
HDLC SERPL-MCNC: 58 MG/DL
HGB BLD-MCNC: 12.6 G/DL (ref 11.7–15.7)
IMM GRANULOCYTES # BLD: 0 10E3/UL
IMM GRANULOCYTES NFR BLD: 0 %
INSULIN SERPL-ACNC: 16.6 UU/ML (ref 2.6–24.9)
LDLC SERPL CALC-MCNC: 114 MG/DL
LYMPHOCYTES # BLD AUTO: 1.6 10E3/UL (ref 0.8–5.3)
LYMPHOCYTES NFR BLD AUTO: 25 %
MCH RBC QN AUTO: 29.5 PG (ref 26.5–33)
MCHC RBC AUTO-ENTMCNC: 31.9 G/DL (ref 31.5–36.5)
MCV RBC AUTO: 93 FL (ref 78–100)
MONOCYTES # BLD AUTO: 0.5 10E3/UL (ref 0–1.3)
MONOCYTES NFR BLD AUTO: 8 %
NEUTROPHILS # BLD AUTO: 4.1 10E3/UL (ref 1.6–8.3)
NEUTROPHILS NFR BLD AUTO: 62 %
NONHDLC SERPL-MCNC: 128 MG/DL
NRBC # BLD AUTO: 0 10E3/UL
NRBC BLD AUTO-RTO: 0 /100
PLATELET # BLD AUTO: 213 10E3/UL (ref 150–450)
PROGEST SERPL-MCNC: 1.8 NG/ML
RBC # BLD AUTO: 4.27 10E6/UL (ref 3.8–5.2)
TRIGL SERPL-MCNC: 69 MG/DL
WBC # BLD AUTO: 6.4 10E3/UL (ref 4–11)

## 2023-12-21 PROCEDURE — 83525 ASSAY OF INSULIN: CPT | Performed by: SURGERY

## 2023-12-21 PROCEDURE — 82947 ASSAY GLUCOSE BLOOD QUANT: CPT | Performed by: SURGERY

## 2023-12-21 PROCEDURE — 510N000009 HC RESEARCH FACILITY, PER 15 MIN

## 2023-12-21 PROCEDURE — 83036 HEMOGLOBIN GLYCOSYLATED A1C: CPT | Performed by: SURGERY

## 2023-12-21 PROCEDURE — 86140 C-REACTIVE PROTEIN: CPT | Performed by: SURGERY

## 2023-12-21 PROCEDURE — 300N000003 HC RESEARCH SPECIMEN PROCESSING, SIMPLE

## 2023-12-21 PROCEDURE — 80061 LIPID PANEL: CPT | Performed by: SURGERY

## 2023-12-21 PROCEDURE — 82670 ASSAY OF TOTAL ESTRADIOL: CPT | Performed by: SURGERY

## 2023-12-21 PROCEDURE — 84144 ASSAY OF PROGESTERONE: CPT | Performed by: SURGERY

## 2023-12-21 PROCEDURE — 510N000017 HC CRU PATIENT CARE, PER 15 MIN

## 2023-12-21 PROCEDURE — 84403 ASSAY OF TOTAL TESTOSTERONE: CPT | Performed by: SURGERY

## 2023-12-21 PROCEDURE — 85025 COMPLETE CBC W/AUTO DIFF WBC: CPT | Performed by: SURGERY

## 2023-12-21 NOTE — ADDENDUM NOTE
Encounter addended by: Shaheen Conde on: 12/21/2023 3:35 PM   Actions taken: Charge Capture section accepted

## 2023-12-24 LAB — TESTOST SERPL-MCNC: 15 NG/DL (ref 8–60)

## 2024-01-18 ENCOUNTER — HOSPITAL ENCOUNTER (OUTPATIENT)
Dept: RESEARCH | Facility: CLINIC | Age: 24
Discharge: HOME | End: 2024-01-18
Attending: SURGERY
Payer: COMMERCIAL

## 2024-01-18 ENCOUNTER — HOSPITAL ENCOUNTER (OUTPATIENT)
Dept: CARDIOLOGY | Facility: CLINIC | Age: 24
Discharge: HOME | End: 2024-01-18
Attending: INTERNAL MEDICINE
Payer: COMMERCIAL

## 2024-01-18 ENCOUNTER — LAB REQUISITION (OUTPATIENT)
Dept: LAB | Facility: CLINIC | Age: 24
End: 2024-01-18

## 2024-01-18 LAB
BASOPHILS # BLD AUTO: 0 10E3/UL (ref 0–0.2)
BASOPHILS NFR BLD AUTO: 0 %
CHOLEST SERPL-MCNC: 195 MG/DL
CRP SERPL-MCNC: 10.9 MG/L
EOSINOPHIL # BLD AUTO: 0.1 10E3/UL (ref 0–0.7)
EOSINOPHIL NFR BLD AUTO: 2 %
ERYTHROCYTE [DISTWIDTH] IN BLOOD BY AUTOMATED COUNT: 14.4 % (ref 10–15)
ESTRADIOL SERPL-MCNC: 113 PG/ML
FASTING STATUS PATIENT QL REPORTED: YES
FASTING STATUS PATIENT QL REPORTED: YES
GLUCOSE SERPL-MCNC: 78 MG/DL (ref 70–99)
HBA1C MFR BLD: 5.5 %
HCT VFR BLD AUTO: 39 % (ref 35–47)
HDLC SERPL-MCNC: 47 MG/DL
HGB BLD-MCNC: 12.9 G/DL (ref 11.7–15.7)
IMM GRANULOCYTES # BLD: 0 10E3/UL
IMM GRANULOCYTES NFR BLD: 0 %
INSULIN SERPL-ACNC: 18.2 UU/ML (ref 2.6–24.9)
LDLC SERPL CALC-MCNC: 127 MG/DL
LVEF ECHO: NORMAL
LYMPHOCYTES # BLD AUTO: 2.1 10E3/UL (ref 0.8–5.3)
LYMPHOCYTES NFR BLD AUTO: 25 %
MCH RBC QN AUTO: 29.9 PG (ref 26.5–33)
MCHC RBC AUTO-ENTMCNC: 33.1 G/DL (ref 31.5–36.5)
MCV RBC AUTO: 90 FL (ref 78–100)
MONOCYTES # BLD AUTO: 0.4 10E3/UL (ref 0–1.3)
MONOCYTES NFR BLD AUTO: 5 %
NEUTROPHILS # BLD AUTO: 5.8 10E3/UL (ref 1.6–8.3)
NEUTROPHILS NFR BLD AUTO: 68 %
NONHDLC SERPL-MCNC: 148 MG/DL
NRBC # BLD AUTO: 0 10E3/UL
NRBC BLD AUTO-RTO: 0 /100
PLATELET # BLD AUTO: 237 10E3/UL (ref 150–450)
PROGEST SERPL-MCNC: 1.4 NG/ML
RBC # BLD AUTO: 4.32 10E6/UL (ref 3.8–5.2)
TRIGL SERPL-MCNC: 107 MG/DL
WBC # BLD AUTO: 8.5 10E3/UL (ref 4–11)

## 2024-01-18 PROCEDURE — 84144 ASSAY OF PROGESTERONE: CPT | Performed by: SURGERY

## 2024-01-18 PROCEDURE — 86140 C-REACTIVE PROTEIN: CPT | Performed by: SURGERY

## 2024-01-18 PROCEDURE — 510N000009 HC RESEARCH FACILITY, PER 15 MIN

## 2024-01-18 PROCEDURE — 84403 ASSAY OF TOTAL TESTOSTERONE: CPT | Performed by: SURGERY

## 2024-01-18 PROCEDURE — 83036 HEMOGLOBIN GLYCOSYLATED A1C: CPT | Performed by: SURGERY

## 2024-01-18 PROCEDURE — 82947 ASSAY GLUCOSE BLOOD QUANT: CPT | Performed by: SURGERY

## 2024-01-18 PROCEDURE — 80061 LIPID PANEL: CPT | Performed by: SURGERY

## 2024-01-18 PROCEDURE — 82670 ASSAY OF TOTAL ESTRADIOL: CPT | Performed by: SURGERY

## 2024-01-18 PROCEDURE — 93306 TTE W/DOPPLER COMPLETE: CPT | Mod: 26 | Performed by: INTERNAL MEDICINE

## 2024-01-18 PROCEDURE — 83525 ASSAY OF INSULIN: CPT | Performed by: SURGERY

## 2024-01-18 PROCEDURE — 510N000017 HC CRU PATIENT CARE, PER 15 MIN

## 2024-01-18 PROCEDURE — C8929 TTE W OR WO FOL WCON,DOPPLER: HCPCS

## 2024-01-18 PROCEDURE — 255N000002 HC RX 255 OP 636: Performed by: INTERNAL MEDICINE

## 2024-01-18 PROCEDURE — 85025 COMPLETE CBC W/AUTO DIFF WBC: CPT | Performed by: SURGERY

## 2024-01-18 PROCEDURE — 300N000003 HC RESEARCH SPECIMEN PROCESSING, SIMPLE

## 2024-01-18 RX ADMIN — PERFLUTREN 5 ML: 6.52 INJECTION, SUSPENSION INTRAVENOUS at 15:18

## 2024-01-18 NOTE — ADDENDUM NOTE
Encounter addended by: Chandrika Whitney RN on: 1/18/2024 1:31 PM   Actions taken: Charge Capture section accepted

## 2024-01-18 NOTE — ADDENDUM NOTE
Encounter addended by: Susan Coffman RN on: 1/18/2024 1:21 PM   Actions taken: Charge Capture section accepted

## 2024-01-20 LAB — TESTOST SERPL-MCNC: 13 NG/DL (ref 8–60)

## 2024-02-06 DIAGNOSIS — Z00.6 RESEARCH STUDY PATIENT: ICD-10-CM

## 2024-02-06 DIAGNOSIS — Z00.6 EXAMINATION OF PARTICIPANT OR CONTROL IN CLINICAL RESEARCH: Primary | ICD-10-CM

## 2024-08-26 ENCOUNTER — ALLIED HEALTH/NURSE VISIT (OUTPATIENT)
Dept: PSYCHIATRY | Facility: CLINIC | Age: 24
End: 2024-08-26
Payer: COMMERCIAL

## 2024-08-26 DIAGNOSIS — F33.1 MAJOR DEPRESSIVE DISORDER, RECURRENT EPISODE, MODERATE (H): Primary | ICD-10-CM

## 2024-09-03 ENCOUNTER — ALLIED HEALTH/NURSE VISIT (OUTPATIENT)
Dept: PSYCHIATRY | Facility: CLINIC | Age: 24
End: 2024-09-03
Payer: COMMERCIAL

## 2024-09-03 DIAGNOSIS — F33.1 MODERATE EPISODE OF RECURRENT MAJOR DEPRESSIVE DISORDER (H): ICD-10-CM

## 2024-09-03 DIAGNOSIS — F33.1 MAJOR DEPRESSIVE DISORDER, RECURRENT EPISODE, MODERATE (H): Primary | ICD-10-CM

## 2024-09-03 NOTE — PROGRESS NOTES
Met with patient for further follow up regarding the recent concerns about functioning of the Vagus Nerve Stimulator (VNS)  The visit was ordered by Dr.Ziad Fregoso. Supervising provider of the day is Dr. Abner Yi, who was available in-person at all times if needed.  Patient recently had imaging studies at an outside facility, and since the study (where the VNS had been turned off and back on as per usual imaging safety routines) she had not been able to notice the stimulations as much as prior to the study.  She was seen by nursing staff at this clinic and the device was checked. Ildefonso Levy had recommended that she come back to have the device output current increased to see if that had any effect on perceived treatment.    Patient was accompanied by her father.  VNS was interrogated. Device diagnostics performed and were WNL.  Following a discussion with the patient we increased the output current to 20mA.  Patient was observed for 15 minutes following output increase.  She was noted to have voice change during stimulation, and reported a sensation the device was activated, however it was not uncomfortable.  Final interrogation performed to confirm discharge settings.  See below for settings.    We discussed magnet use for if the stimulation becomes uncomfortable, and to contact us if she feels the device is not functioning as it may be an indicator of generator battery depletion.  During our discussions it became apparent that Yoana has benefited from the device, and so when device is close to end of service, timely replacement would be prudent.    No other questions. Patient felt comfortable being discharged on the higher output.      [employer] Vagus Nerve Stimulator interrogated. Settings as follows:-  Patient ID CMS, Model ID 1000, Serial # [id number], Implant date June 11th, 2021.    -------------------    NORMAL SETTINGS :  Output Current 1.75 mA, increased to 2.0mA  Pulse/signal Frequency 20 Hz,  Pulse Width 250  ?sec,  On Time 30 sec,  Off Time 5 min,     ------------------    IFI NO.    -------------------    MAGNET SETTINGS :  Magnet Output Current 0mA,  Magnet Pulse Width 250 ?sec,  Magnet On Time 60 sec.    -------------------    AUTOSTIM SETTINGS :   AutoStim Disabled    -------------------    CONFIGURATION SETTINGS:  Tachycardia Detection N/A     ---------------------    System Diagnostic performed  Patient ID CMS  Model 1000  S/N [id number]  Implant Date June 11th 2021  Communication OK  Output current 1.75mA   Current Delivered 1.75mA  Impedance 2580 Ohms  IFI NO

## 2024-09-22 ENCOUNTER — HEALTH MAINTENANCE LETTER (OUTPATIENT)
Age: 24
End: 2024-09-22

## 2024-12-10 NOTE — PROGRESS NOTES
Neurosurgery Clinic Note    Reason for Visit: Postoperative visit    History of Present Illness  Yoana Webber is a 21-year-old woman with a significant history of treatment refractory depression that is now 2 weeks status post VNS implantation.  She is currently at a residential treatment facility and doing okay but struggling generally with her depression.  She has had no real difficulties with her incisions and has not observed any erythema, swelling, significant pain, or drainage.      She denies any fevers, chills, night sweats.         Allergies   Allergen Reactions     Latex Hives     Vicodin [Hydrocodone-Acetaminophen] Other (See Comments), Nausea and Vomiting and Unknown     Severe hallucinations. Patient reports hallucinations        Codeine Other (See Comments), Nausea and Vomiting and Unknown     Pt reports having hallucinations.  Pt reports tolerating Tylenol         Current Outpatient Medications   Medication     albuterol (PROAIR HFA/PROVENTIL HFA/VENTOLIN HFA) 108 (90 Base) MCG/ACT inhaler     ARIPiprazole (ABILIFY) 5 MG tablet     atomoxetine (STRATTERA) 60 MG capsule     Calcium-Vitamin D-Vitamin K 500-100-40 MG-UNT-MCG CHEW     cholecalciferol (VITAMIN D3) 125 mcg (5000 units) capsule     clobetasol (TEMOVATE) 0.05 % CREA cream     clobetasol (TEMOVATE) 0.05 % external cream     clonazePAM (KLONOPIN) 1 MG tablet     desvenlafaxine (PRISTIQ) 100 MG 24 hr tablet     Levonorgestrel-Ethinyl Estrad (VIENVA PO)     memantine (NAMENDA) 5 MG tablet     montelukast (SINGULAIR) 10 MG tablet     ondansetron (ZOFRAN) 4 MG tablet     ondansetron (ZOFRAN-ODT) 8 MG ODT tab     polyethylene glycol (MIRALAX) 17 GM/Dose powder     prazosin (MINIPRESS) 2 MG capsule     Prenatal 27-1 MG TABS     traZODone (DESYREL) 100 MG tablet     vitamin  s/Minerals TABS     ziprasidone (GEODON) 60 MG capsule     No current facility-administered medications for this visit.      Facility-Administered Medications Ordered in  Speech-Language Pathology Visit    Visit Type: Daily Treatment Note  Visit: 2  Referring Provider: Delfino Linda DO  Medical Diagnosis (from order): Diagnosis Information      Diagnosis    G20.A2 (ICD-10-CM) - Parkinson disease without dyskinesia, with   fluctuating manifestations (CMD)    R13.10 (ICD-10-CM) - Dysphagia, unspecified              SUBJECTIVE                                                                                                             Patient presents for swallowing therapy session.     OBJECTIVE                                                                                                                            Swallow    Oral/Pharyngeal Exercises  Reviewed VFSS   - re-watched images   - outlined deficits that we will target in ST   - reviewed diet recommendations    Oropharyngeal exercises:  - tongue pull backs   - mendelsohn   - effortful swallow  - CTAR                    Home Exercise Program/Education Materials  Access Code: [email or web address]  URL: [email or web address]  Date: 12/10/2024  Prepared by: Ayesha Londono    Exercises  - Chin Tuck Against Resistance with Towel  - 1 x daily - 3 sets - 30 reps - 60 second hold  - Effortful Swallow  - 1 x daily - 3 sets - 25 reps  - Mendelsohn Maneuver  - 1 x daily - 3 sets - 15 reps  - Tongue Retraction   - 1 x daily - 3 sets - 15 reps    ASSESSMENT                                                                                                           Patient seen for swallowing therapy. Reviewed VFSS and results. Provided guided teaching and practice of all recommended swallowing exercises. Education on recommended frequency. He is able to complete all exercises with fading cues. Recommend HEP as outlined above. Follow up next week to answer any questions and ensure he is completing exercises correctly. Patient verbalizes understanding. Patient continues to benefit from skilled ST.   Education:   - Results of above  "Other Visits   Medication     heparin 100 UNIT/ML injection 5 mL     heparin lock flush 10 UNIT/ML injection 5 mL     hydrALAZINE (APRESOLINE) injection 10 mg     INFUSION HYPERSENSITIVITY     ketamine (KETALAR) 0.5 mg/kg = 27.5 mg in sodium chloride 0.9 % 50.55 mL intermittent infusion     ondansetron (ZOFRAN) injection 4 mg     sodium chloride (PF) 0.9% PF flush 3-20 mL             Physical Exam  /85   Pulse 107   Resp 16   Ht 1.626 m (5' 4\")   Wt 78.5 kg (173 lb)   SpO2 97%   BMI 29.70 kg/m        General: Awake, alert, oriented. Well nourished, well developed, no acute distress.  Incisions: removed glue. Both appear clean, dry, nearly completely healed.  HEENT: Head normocephalic, atraumatic.   Extremity: Warm with no clubbing or cyanosis. No lower extremity edema.    Neurological  Awake, alert and oriented to date, time, place and person. Speech fluent.   Pupils equal, round, reactive to light.  Extraocular movement intact.  Hearing is grossly normal to finger rub.   Face symmetric.  Tongue midline.  Uvula elevates equally.    Motor: full strength throughout.  Sensation: intact to light touch and pinpoint.    ROS: 10 point ROS neg other than the symptoms noted above in the HPI.        Assessment and Plan   Yoana Webber is a 21 year old female who is now status post vagal nerve stimulator implant with nearly healed incisions and no signs of infection.  I spoke with Dr. Fregoso today and we scheduled an appointment on Friday at noon so that her stimulator can be turned on.  She will continue to follow-up with him for programming.  She can follow-up with me as needed.            Andres Martinez MD  Department of Neurosurgery  [facility]    " outlined education: Verbalizes understanding and Demonstrates understanding    PLAN                                                                                                                           Suggestions for next session as indicated: Progress per plan of care,          Therapy procedure time and total treatment time can be found documented on the Time Entry flowsheet

## 2025-04-09 ENCOUNTER — TELEPHONE (OUTPATIENT)
Dept: PSYCHIATRY | Facility: CLINIC | Age: 25
End: 2025-04-09

## 2025-04-09 NOTE — TELEPHONE ENCOUNTER
Writer received a call from patient today stating that she has started to notice some pain in her neck with stimulation with her VNS.  She notes that it has been happening for the past couple of hours.      She did put her magnet on when she was driving because she could not concentrate to drive.           Writer scheduled patient an appointment to come in tomorrow and encouraged her to tape magnet on her generator to turn VNS off if it continues to be painful.      Patient verbalized understanding and had no further questions.

## 2025-04-10 ENCOUNTER — ALLIED HEALTH/NURSE VISIT (OUTPATIENT)
Dept: PSYCHIATRY | Facility: CLINIC | Age: 25
End: 2025-04-10
Payer: COMMERCIAL

## 2025-04-10 DIAGNOSIS — F33.1 MODERATE EPISODE OF RECURRENT MAJOR DEPRESSIVE DISORDER (H): Primary | ICD-10-CM

## 2025-04-10 NOTE — PROGRESS NOTES
[facility]:  Care Coordination Note     SITUATION   Yoana Webber is a 25 year old female who is receiving support for:  Clinic Care Coordination - Face To Face (VNS)      BACKGROUND     VNS adjustment     ASSESSMENT     Patient reports that she started to notice some pain during her VNS stimulation starting yesterday.  Denies any trauma to VNS site and states that it still continues to be very helpful for her depression.  VNS was interrogated today with no issues.  Please see settings below; Diagnostics were normal.  We turned her VNS down one step.  She noted that she could still feel the stimulation but noted that it was much more tolerable.  We reviewed the use of her magnet and that she can continue to use this if needing a break from the stimulation.  She continues to report that her VNS is incredibly helpful, reports that it was life changing.  She reports her mood has been great and she is able to do the things she needs to do during the day.  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  We also scheduled a follow up appointment with Dr. Fregoso this summer.                [employer] Vagus Nerve Stimulator interrogated. Settings as follows:-  Patient ID CMS, Model ID , Serial # [id number], Implant date 4/10/2021.    -------------------  Changes are in RED    NORMAL SETTINGS :  Output Current 2 mA, -> 1.75 mA  Pulse/signal Frequency 20 Hz,  Pulse Width 250 ?sec,  On Time 30 sec,  Off Time 5 min,     ------------------    DIAGNOSTICS:   Lead impedance 2482 Ohms   Generator Battery 50%-75%      PLAN          Nursing Interventions:  VNS    Follow-up plan:  RN to follow PRN       This service provided today was under the supervising provider of the day TE Garza MD, who was available if needed.      Nathalia Mancilla RN

## 2025-06-16 ENCOUNTER — OFFICE VISIT (OUTPATIENT)
Dept: PSYCHIATRY | Facility: CLINIC | Age: 25
End: 2025-06-16
Payer: COMMERCIAL

## 2025-06-16 VITALS
HEART RATE: 91 BPM | HEIGHT: 64 IN | TEMPERATURE: 97.7 F | DIASTOLIC BLOOD PRESSURE: 74 MMHG | BODY MASS INDEX: 32.61 KG/M2 | SYSTOLIC BLOOD PRESSURE: 108 MMHG

## 2025-06-16 DIAGNOSIS — F33.1 MODERATE EPISODE OF RECURRENT MAJOR DEPRESSIVE DISORDER (H): Primary | ICD-10-CM

## 2025-06-16 RX ORDER — GABAPENTIN 300 MG/1
CAPSULE ORAL
COMMUNITY
Start: 2025-05-11

## 2025-06-16 RX ORDER — PRAZOSIN HYDROCHLORIDE 5 MG/1
5 CAPSULE ORAL AT BEDTIME
COMMUNITY
Start: 2025-06-09

## 2025-06-16 RX ORDER — ZIPRASIDONE HYDROCHLORIDE 20 MG/1
CAPSULE ORAL
COMMUNITY
Start: 2025-06-09

## 2025-06-16 RX ORDER — ZONISAMIDE 100 MG/1
400 CAPSULE ORAL DAILY
COMMUNITY
Start: 2025-05-28

## 2025-06-16 RX ORDER — ATOMOXETINE 40 MG/1
CAPSULE ORAL
COMMUNITY
Start: 2025-04-10

## 2025-06-16 NOTE — PROGRESS NOTES
Psychiatry Clinic Progress Note                                                                   Yoana Webber is a 22 year old female who prefers the name Yoana & pronouns she, her, hers.  Referred by:  Dr. Jes Rey M.D. Receives Ketamine per [facility]  History was provided by patient and mother who was a good historian.     Pertinent Background: Hx of chronic suicidality, SIB (last was cutting on Sept 19, 2020), and eating disorder. 4 prior suicide attempts. Has had >10 hospitalizations and 1 year of residential treatment (ended December 2019). Previously benefited from ECT but developed delirium. partial ketamine response. Returned to residential treatment 2021, VNS implanted 6/2021. Her mood can respond slightly to positive life event    Interim History                                                                                                        4, 4     Since last visit:   Patient expressed that her mood is good. On a scale of 0 to 10 where 0 is the worst, she rated her mood as 8/10.       Regarding the VNS, she feels that it is helpful for the depression. For example, she is able to socialize virtually. Most of her friends are through the eating disorder program that she is in. One in [place], and the other one in [place]. They text each other and talk often. She doesn't have other friend outside of the program.     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 She had two surgeries resulting for that.     Regarding the VNS functionality, it doesn't bother her. Side effect is tolerable. She mentioned that her voice becomes coarse sometimes- it did happen during interview. She turns it off when she is talking with people outside of her family. Didd experience [id number] pain and had her settings lowered. Since has tolerated it.     She reported that sleep is good -trazodone and xanax help. She still struggles at times with worries but takes her prn if  they have lasted more than 30 minutes so she could get some sleep.     Patient denies SI/HI, AH/VH            Recent Symptoms:   Depression:  Denies suicidal ideation without intent or plan, depressed mood, low energy, feeling worthless and more controlled dysregulation  Anxiety:  Patient is able to socialize virtually with others.     PHQ-9 score today: 13     Recent Substance Use:  none reported        Social/ Family History                                  [per patient report]                                 1ea,1ea   LIVING SITUATION- Living with Dad       Medical / Surgical History                                                                                                                  Past Medical History:   Diagnosis Date    Anxiety     Depressive disorder     OCD (obsessive compulsive disorder)     PTSD (post-traumatic stress disorder)      Past Surgical History:   Procedure Laterality Date    CHOLECYSTECTOMY      ENT SURGERY      tonsillectomy    IMPLANT STIMULATOR VAGUS NERVE Left 6/11/2021    Procedure: Vagus nerve stimulator placement with placement of generator/battery over left chest wall  **Latex Allergy**;  Surgeon: Andres Martinez MD;  Location: [facility]       Medical Review of Systems                                                                                                    2,10   Medical review is constricted to what is mentioned in interim history section above.    Allergy                                Latex, Vicodin [hydrocodone-acetaminophen], and Codeine    Current Medications                                                                                                       Current Outpatient Medications:     albuterol (PROAIR HFA/PROVENTIL HFA/VENTOLIN HFA) 108 (90 Base) MCG/ACT inhaler, Inhale 2 puffs into the lungs every 4 hours as needed for shortness of breath / dyspnea or wheezing, Disp: , Rfl:     ALPRAZolam (XANAX) 1 MG tablet, Take 1 mg by mouth Take 1 mg by mouth 3  (three) times a day as needed, Disp: , Rfl:     ARIPiprazole (ABILIFY) 15 MG tablet, Take 15 mg by mouth daily , Disp: , Rfl:     clobetasol (TEMOVATE) 0.05 % CREA cream, Apply topically 2 times daily as needed for eczema, Disp: , Rfl:     clobetasol (TEMOVATE) 0.05 % external cream, APPLY TO THE AFFECTED AREA(S) TWICE DAILY AS NEEDED for eczema, Disp: , Rfl:     clonazePAM (KLONOPIN) 1 MG tablet, Take 1 tablet (1 mg) by mouth 2 times daily as needed for anxiety (Patient taking differently: Take 1 mg by mouth 3 times daily as needed for anxiety), Disp: 60 tablet, Rfl: 0    desvenlafaxine (PRISTIQ) 100 MG 24 hr tablet, Take 1 tablet (100 mg) by mouth At Bedtime (Patient taking differently: Take 100 mg by mouth every morning), Disp: 30 tablet, Rfl: 1    gabapentin (NEURONTIN) 400 MG capsule, Take 400 mg by mouth 2 times daily (Patient not taking: No sig reported), Disp: , Rfl:     Levonorgestrel-Ethinyl Estrad (VIENVA PO), Take 1 tablet by mouth At Bedtime, Disp: , Rfl:     memantine (NAMENDA) 10 MG tablet, Take 10 mg by mouth 2 times daily , Disp: , Rfl:     montelukast (SINGULAIR) 10 MG tablet, Take 10 mg by mouth At Bedtime  (Patient not taking: No sig reported), Disp: , Rfl:     ondansetron (ZOFRAN) 4 MG tablet, Take 1 tablet (4 mg) by mouth every 8 hours as needed for nausea or vomiting, Disp: 30 tablet, Rfl: 0    polyethylene glycol (MIRALAX) 17 GM/Dose powder, Take 1 capful by mouth 2 times daily as needed , Disp: , Rfl:     prazosin (MINIPRESS) 2 MG capsule, Take 1 capsule (2 mg) by mouth At Bedtime (Patient not taking: No sig reported), Disp: 30 capsule, Rfl: 1    Prenatal 27-1 MG TABS, , Disp: , Rfl:     simethicone (MYLICON) 125 MG chewable tablet, Take 125 mg by mouth daily as needed , Disp: , Rfl:     topiramate (TOPAMAX) 25 MG tablet, Take 25 mg by mouth 2 times daily 1 tabs in morning and 2 tabs at night., Disp: , Rfl:     traZODone (DESYREL) 100 MG tablet, Take 50 mg by mouth At Bedtime , Disp: , Rfl:  "    ziprasidone (GEODON) 60 MG capsule, Take 60 mg by mouth At Bedtime , Disp: , Rfl:   No current facility-administered medications for this visit.    Facility-Administered Medications Ordered in Other Visits:     hydrALAZINE (APRESOLINE) injection 10 mg, 10 mg, Intravenous, Once PRN, Zita Fregoso MD    INFUSION HYPERSENSITIVITY, , Does not apply, Continuous PRN, Zita Fregoso MD    ketamine (KETALAR) 0.5 mg/kg = 27.5 mg in sodium chloride 0.9 % 50.55 mL intermittent infusion, 0.5 mg/kg (Ideal), Intravenous, Once, Zita Fregoso MD    sodium chloride (PF) 0.9% PF flush 3-20 mL, 3-20 mL, Intracatheter, Q1H PRN, Zita Fregoso MD        Vitals                                                                                                                       3, 3   There were no vitals taken for this visit.     Mental Status Exam                                                                                    9, 14 cog gs     Alertness: alert  and oriented  Appearance: well groomed, casual clothes.   Behavior/Demeanor: cooperative and calm, with good  eye contact   Speech: regular rate and rhythm  Language: intact  Psychomotor: normal or unremarkable and slowed  Mood: \"good\"  Affect: appropriate, non reactive,and congruent with mood  Thought Process/Associations: comprehensive, logical, and linear   Thought Content:  Reports none without intent or plan ;  Denies suicidal ideation and delusions  Perception: Reports: none Denies visual/ auditory hallucinations  Insight: good  Judgment: good  Cognition: (6) does  appear grossly intact; formal cognitive testing was not done     Gait/Station and/or Muscle Strength/Tone: normal    Labs and Data                                                                                                                 VNS   Diagnostics    Impedance : 2944 Ohms  Battery %    Settings on 6/25/2021:  1mA  Frequency 20hz  Pulse width 250 microsec  On time : 30 sec  Off time 5 min "     Settings on 7/26/2021  1.5 mA  Frequency 20hz  Pulse width 250 microsec  On time : 30 sec  Off time 5 min     Settings on 8/16/2021  1.75 mA - tolerated increase from 1.5 mA with minor and tolerable tingling felt on inferior left side of mentum  Frequency 20hz  Pulse width 250 microsec  On time : 30 sec  Off time 5 min     Settings on 12/20/2021  1.75 mA - tolerated increase from 1.5 mA with minor and tolerable tingling felt on inferior left side of mentum  Frequency 20hz  Pulse width 250 microsec  On time : 30 sec  Off time 5 min     Settings (3/21/2022)  1.75 mA - no change done this visit  Frequency 20hz  Pulse width 250 microsec  On time : 30 sec  Off time 5 min     Settings (6/20/2022)  1.75 mA - no change done this visit  Frequency 20 Hz  Pulse width 250 microsec  On time : 30 sec  Off time 5 min     Settings  (12/19/2022)  1.75 mA - no change done this visit  Frequency 20 Hz  Pulse width 250 microsec  On time : 30 sec  Off time 5 min        Settings today (6/16/2025)  1.75 mA - no change done this visit  Frequency 20 Hz  Pulse width 250 microsec  On time : 30 sec  Off time 5 min     PHQ 12/24/2021 3/21/2022 6/20/2022   PHQ-9 Total Score 12 8 9   Q9: Thoughts of better off dead/self-harm past 2 weeks More than half the days Several days Not at all       Diagnosis and Assessment                                                                             m2, h3     Today the following issues were addressed:    Major Depressive Disorder  VNS interrogation and ketamine follow-up    Yoana presented alone to clinic. She is stable, and notes great benefit from VNS and Ketamine.     Her mood and energy are good.     Will continue with same meds regimen and VNS.    Will reassess in 6 months or sooner if any complaints arise.      [place] Prescription Monitoring Program [] review was not needed today.    Drug Interaction Management: Monitoring for adverse effects    Plan                                                                                                                     m2, h3      1) Major Depressive Disorder           A.  Medication:     Continue oral medications as managed by Dr. Jes Rey and ketamine per [facility]            B. Psychotherapy:     Continue  Psychotherapy    2) VNS    - side effects: tolerable.    - Follow-up: 6 months. Stressed the important of regular monitoring (current battery 50-75%). Monitor q3-4 months once come below 50% battery capacity.      RTC: 6 months     CRISIS NUMBERS:   Provided routinely in AVS.    Treatment Risk Statement:  The patient understands the risks, benefits, adverse effects and alternatives. Agrees to treatment with the capacity to do so. No medical contraindications to treatment. Agrees to call clinic for any problems. The patient understands to call 911 or go to the nearest ED if life threatening or urgent symptoms occur.     Provider:Zita Fregoso MD    Visit 30 minutes  Review of records and documentation: 15 min

## 2025-06-17 ASSESSMENT — PATIENT HEALTH QUESTIONNAIRE - PHQ9: SUM OF ALL RESPONSES TO PHQ QUESTIONS 1-9: 7

## (undated) DEVICE — SU VICRYL 2-0 CT-2 27" J333H

## (undated) DEVICE — SU NUROLON 4-0 TF CR 8X18" C584D

## (undated) DEVICE — SOL NACL 0.9% IRRIG 1000ML BOTTLE 2F7124

## (undated) DEVICE — PAD CHUX UNDERPAD 23X24" 7136

## (undated) DEVICE — LINEN TOWEL PACK X5 5464

## (undated) DEVICE — SU SILK 2-0 TIE 12X30" A305H

## (undated) DEVICE — COVER CAMERA VIDEO 5X96" 29-59009

## (undated) DEVICE — TUNNELING TOOL CYBERONICS VNS 402

## (undated) DEVICE — ESU ELEC BLADE 2.75" COATED/INSULATED E1455

## (undated) DEVICE — SU VICRYL 3-0 SH 8X18" UND J864D

## (undated) DEVICE — ADH SKIN CLOSURE PREMIERPRO EXOFIN 1.0ML 3470

## (undated) DEVICE — PREP SKIN SCRUB TRAY 4461A

## (undated) DEVICE — SOL WATER IRRIG 1000ML BOTTLE 2F7114

## (undated) DEVICE — PREP POVIDONE IODINE SCRUB 7.5% 4OZ APL82212

## (undated) DEVICE — PREP CHLORAPREP CLEAR 3ML 260400

## (undated) DEVICE — VESSEL LOOPS YELLOW MAXI 31145694

## (undated) DEVICE — PREP POVIDONE IODINE SOLUTION 10% 4OZ

## (undated) DEVICE — PACK NEURO MINOR UMMC SNE32MNMU4

## (undated) DEVICE — SUCTION MANIFOLD NEPTUNE 2 SYS 4 PORT 0702-020-000

## (undated) DEVICE — LINEN TOWEL PACK X6 WHITE 5487

## (undated) DEVICE — SU MONOCRYL 4-0 PS-2 27" UND Y426H

## (undated) DEVICE — DRAPE IOBAN INCISE 13X13" 6640EZ

## (undated) DEVICE — SU ETHIBOND 3-0 RB-1DA 36" X558H

## (undated) DEVICE — SPONGE SURGIFOAM 12 1972

## (undated) RX ORDER — OXYCODONE HYDROCHLORIDE 5 MG/1
TABLET ORAL
Status: DISPENSED
Start: 2021-06-11

## (undated) RX ORDER — HYDROMORPHONE HYDROCHLORIDE 1 MG/ML
INJECTION, SOLUTION INTRAMUSCULAR; INTRAVENOUS; SUBCUTANEOUS
Status: DISPENSED
Start: 2021-06-11

## (undated) RX ORDER — ONDANSETRON 2 MG/ML
INJECTION INTRAMUSCULAR; INTRAVENOUS
Status: DISPENSED
Start: 2021-06-11

## (undated) RX ORDER — FENTANYL CITRATE-0.9 % NACL/PF 10 MCG/ML
PLASTIC BAG, INJECTION (ML) INTRAVENOUS
Status: DISPENSED
Start: 2021-06-11

## (undated) RX ORDER — CEFAZOLIN SODIUM 2 G/100ML
INJECTION, SOLUTION INTRAVENOUS
Status: DISPENSED
Start: 2021-06-11

## (undated) RX ORDER — ACETAMINOPHEN 325 MG/1
TABLET ORAL
Status: DISPENSED
Start: 2021-06-11

## (undated) RX ORDER — DEXAMETHASONE SODIUM PHOSPHATE 4 MG/ML
INJECTION, SOLUTION INTRA-ARTICULAR; INTRALESIONAL; INTRAMUSCULAR; INTRAVENOUS; SOFT TISSUE
Status: DISPENSED
Start: 2021-06-11

## (undated) RX ORDER — LIDOCAINE HYDROCHLORIDE 20 MG/ML
INJECTION, SOLUTION EPIDURAL; INFILTRATION; INTRACAUDAL; PERINEURAL
Status: DISPENSED
Start: 2021-06-11

## (undated) RX ORDER — FENTANYL CITRATE 50 UG/ML
INJECTION, SOLUTION INTRAMUSCULAR; INTRAVENOUS
Status: DISPENSED
Start: 2021-06-11

## (undated) RX ORDER — PROPOFOL 10 MG/ML
INJECTION, EMULSION INTRAVENOUS
Status: DISPENSED
Start: 2021-06-11

## (undated) RX ORDER — LIDOCAINE HYDROCHLORIDE AND EPINEPHRINE 10; 10 MG/ML; UG/ML
INJECTION, SOLUTION INFILTRATION; PERINEURAL
Status: DISPENSED
Start: 2021-06-11